# Patient Record
Sex: FEMALE | Race: WHITE | Employment: OTHER | URBAN - METROPOLITAN AREA
[De-identification: names, ages, dates, MRNs, and addresses within clinical notes are randomized per-mention and may not be internally consistent; named-entity substitution may affect disease eponyms.]

---

## 2017-01-03 ENCOUNTER — HOSPITAL ENCOUNTER (OUTPATIENT)
Dept: CV DIAGNOSTICS | Facility: HOSPITAL | Age: 82
Discharge: HOME | DRG: 310 | End: 2017-01-03
Attending: INTERNAL MEDICINE | Admitting: INTERNAL MEDICINE
Payer: MEDICARE

## 2017-01-03 ENCOUNTER — TELEPHONE (OUTPATIENT)
Dept: INTERNAL MEDICINE CLINIC | Facility: CLINIC | Age: 82
End: 2017-01-03

## 2017-01-03 ENCOUNTER — HOSPITAL ENCOUNTER (INPATIENT)
Dept: CV DIAGNOSTICS | Facility: HOSPITAL | Age: 82
LOS: 1 days | Discharge: HOME | DRG: 310 | End: 2017-01-04
Attending: INTERNAL MEDICINE | Admitting: INTERNAL MEDICINE
Payer: MEDICARE

## 2017-01-03 ENCOUNTER — HOSPITAL ENCOUNTER (OUTPATIENT)
Dept: CV DIAGNOSTICS | Facility: HOSPITAL | Age: 82
Discharge: HOME | DRG: 310 | End: 2017-01-03
Attending: INTERNAL MEDICINE
Payer: MEDICARE

## 2017-01-03 DIAGNOSIS — R42 DIZZINESS: ICD-10-CM

## 2017-01-03 PROBLEM — I10 HTN (HYPERTENSION): Status: ACTIVE | Noted: 2017-01-03

## 2017-01-03 PROBLEM — I48.91 ATRIAL FIBRILLATION (HCC): Status: ACTIVE | Noted: 2017-01-03

## 2017-01-03 PROBLEM — E78.5 HYPERLIPIDEMIA: Status: ACTIVE | Noted: 2017-01-03

## 2017-01-03 PROBLEM — R94.39 ABNORMAL STRESS ECHO: Status: ACTIVE | Noted: 2017-01-03

## 2017-01-03 LAB
ALBUMIN SERPL BCP-MCNC: 3.6 G/DL (ref 3.5–4.8)
ALBUMIN/GLOB SERPL: 1.1 {RATIO} (ref 1–2)
ALP SERPL-CCNC: 41 U/L (ref 32–100)
ALT SERPL-CCNC: 16 U/L (ref 14–54)
ANION GAP SERPL CALC-SCNC: 6 MMOL/L (ref 0–18)
AST SERPL-CCNC: 23 U/L (ref 15–41)
BASOPHILS # BLD: 0.1 K/UL (ref 0–0.2)
BASOPHILS NFR BLD: 1 %
BILIRUB SERPL-MCNC: 0.6 MG/DL (ref 0.3–1.2)
BUN SERPL-MCNC: 19 MG/DL (ref 8–20)
BUN/CREAT SERPL: 20.9 (ref 10–20)
CALCIUM SERPL-MCNC: 9.2 MG/DL (ref 8.5–10.5)
CHLORIDE SERPL-SCNC: 102 MMOL/L (ref 95–110)
CO2 SERPL-SCNC: 29 MMOL/L (ref 22–32)
CREAT SERPL-MCNC: 0.91 MG/DL (ref 0.5–1.5)
EOSINOPHIL # BLD: 0.4 K/UL (ref 0–0.7)
EOSINOPHIL NFR BLD: 4 %
ERYTHROCYTE [DISTWIDTH] IN BLOOD BY AUTOMATED COUNT: 13.4 % (ref 11–15)
GLOBULIN PLAS-MCNC: 3.3 G/DL (ref 2.5–3.7)
GLUCOSE SERPL-MCNC: 104 MG/DL (ref 70–99)
HCT VFR BLD AUTO: 37.6 % (ref 35–48)
HGB BLD-MCNC: 12.6 G/DL (ref 12–16)
LYMPHOCYTES # BLD: 3.2 K/UL (ref 1–4)
LYMPHOCYTES NFR BLD: 37 %
MCH RBC QN AUTO: 29.8 PG (ref 27–32)
MCHC RBC AUTO-ENTMCNC: 33.5 G/DL (ref 32–37)
MCV RBC AUTO: 89 FL (ref 80–100)
MONOCYTES # BLD: 0.9 K/UL (ref 0–1)
MONOCYTES NFR BLD: 10 %
NEUTROPHILS # BLD AUTO: 4.3 K/UL (ref 1.8–7.7)
NEUTROPHILS NFR BLD: 48 %
OSMOLALITY UR CALC.SUM OF ELEC: 287 MOSM/KG (ref 275–295)
PLATELET # BLD AUTO: 352 K/UL (ref 140–400)
PMV BLD AUTO: 7.6 FL (ref 7.4–10.3)
POTASSIUM SERPL-SCNC: 3.6 MMOL/L (ref 3.3–5.1)
PROT SERPL-MCNC: 6.9 G/DL (ref 5.9–8.4)
RBC # BLD AUTO: 4.23 M/UL (ref 3.7–5.4)
SODIUM SERPL-SCNC: 137 MMOL/L (ref 136–144)
WBC # BLD AUTO: 8.9 K/UL (ref 4–11)

## 2017-01-03 PROCEDURE — 99222 1ST HOSP IP/OBS MODERATE 55: CPT | Performed by: INTERNAL MEDICINE

## 2017-01-03 PROCEDURE — 93306 TTE W/DOPPLER COMPLETE: CPT

## 2017-01-03 PROCEDURE — 93306 TTE W/DOPPLER COMPLETE: CPT | Performed by: INTERNAL MEDICINE

## 2017-01-03 PROCEDURE — 85025 COMPLETE CBC W/AUTO DIFF WBC: CPT | Performed by: INTERNAL MEDICINE

## 2017-01-03 PROCEDURE — 80053 COMPREHEN METABOLIC PANEL: CPT | Performed by: INTERNAL MEDICINE

## 2017-01-03 RX ORDER — ATORVASTATIN CALCIUM 10 MG/1
10 TABLET, FILM COATED ORAL NIGHTLY
Status: DISCONTINUED | OUTPATIENT
Start: 2017-01-03 | End: 2017-01-04

## 2017-01-03 RX ORDER — LEVOTHYROXINE SODIUM 88 UG/1
88 TABLET ORAL
Status: DISCONTINUED | OUTPATIENT
Start: 2017-01-03 | End: 2017-01-04

## 2017-01-03 RX ORDER — LISINOPRIL AND HYDROCHLOROTHIAZIDE 20; 12.5 MG/1; MG/1
1 TABLET ORAL DAILY
Status: DISCONTINUED | OUTPATIENT
Start: 2017-01-03 | End: 2017-01-04 | Stop reason: RX

## 2017-01-03 RX ADMIN — ATORVASTATIN CALCIUM 10 MG: 10 TABLET, FILM COATED ORAL at 20:28:00

## 2017-01-03 NOTE — IMAGING NOTE
14:30 - Patient here in cardiac diagnostics for outpatient stress echo. Exercised for 3minutes,  at peak exercise. Paroxysmal supraventricular tachycardia noted at 1:30min into recovery, HR up to 216, resolving approximately 5minutes into recovery.

## 2017-01-03 NOTE — IMAGING NOTE
[id number] - Patient remains asymptomatic. 2Decho has been completed. Patient was waiting in our holding area awaiting a bed, with bedside monitor, maintaining sinus rhythm, asymptomatic denying complaints. Patient transferred to [facility] bed 5, per W/C per RN.   Beds

## 2017-01-03 NOTE — CONSULTS
[facility] HOSP - [facility]    Cardiology Consultation    Chucky Rogel Location: [address]    1935 MRN [id number]   Consulting Date 1/3/2017 [facility] [id number]   Consulting Physician Libertad Berrios MD Attending Physician Edwige tobacco. She reports that she does not drink alcohol or use illicit drugs.     Allergies:    Aspirin                 Rash    Medications:    Current Outpatient Prescriptions on File Prior to Encounter:  Atovaquone-Proguanil HCl 250-100 MG Oral Tab Take 1 ta calm.    Laboratory Data:  ECG from stress baseline: sinus, no ST/T abnormality  ECG at peak stress: atrial fibrillation, 205 bpm  ECHO: EF 55%, no RWMAs    IMPRESSIONS:  1) Paroxysmal AF with RVR. Likely cause of her episodic dizziness.   2) HTN with mild

## 2017-01-03 NOTE — TELEPHONE ENCOUNTER
Spoke with Dr. Nery He. Stress echo shows afib. At rate of 200's. Going to admit. Dr. Bethel Sanchez will see.

## 2017-01-04 ENCOUNTER — APPOINTMENT (OUTPATIENT)
Dept: CV DIAGNOSTICS | Facility: HOSPITAL | Age: 82
DRG: 310 | End: 2017-01-04
Attending: INTERNAL MEDICINE
Payer: MEDICARE

## 2017-01-04 ENCOUNTER — APPOINTMENT (OUTPATIENT)
Dept: NUCLEAR MEDICINE | Facility: HOSPITAL | Age: 82
DRG: 310 | End: 2017-01-04
Attending: INTERNAL MEDICINE
Payer: MEDICARE

## 2017-01-04 VITALS
HEIGHT: 59 IN | TEMPERATURE: 99 F | BODY MASS INDEX: 34.03 KG/M2 | DIASTOLIC BLOOD PRESSURE: 61 MMHG | RESPIRATION RATE: 18 BRPM | OXYGEN SATURATION: 96 % | WEIGHT: 168.81 LBS | SYSTOLIC BLOOD PRESSURE: 136 MMHG | HEART RATE: 78 BPM

## 2017-01-04 LAB
ANION GAP SERPL CALC-SCNC: 8 MMOL/L (ref 0–18)
BASOPHILS # BLD: 0.1 K/UL (ref 0–0.2)
BASOPHILS NFR BLD: 1 %
BUN SERPL-MCNC: 17 MG/DL (ref 8–20)
BUN/CREAT SERPL: 20.2 (ref 10–20)
CALCIUM SERPL-MCNC: 9.3 MG/DL (ref 8.5–10.5)
CHLORIDE SERPL-SCNC: 103 MMOL/L (ref 95–110)
CO2 SERPL-SCNC: 28 MMOL/L (ref 22–32)
CREAT SERPL-MCNC: 0.84 MG/DL (ref 0.5–1.5)
EOSINOPHIL # BLD: 0.4 K/UL (ref 0–0.7)
EOSINOPHIL NFR BLD: 5 %
ERYTHROCYTE [DISTWIDTH] IN BLOOD BY AUTOMATED COUNT: 13.3 % (ref 11–15)
GLUCOSE SERPL-MCNC: 98 MG/DL (ref 70–99)
HCT VFR BLD AUTO: 38.2 % (ref 35–48)
HGB BLD-MCNC: 13.1 G/DL (ref 12–16)
LYMPHOCYTES # BLD: 3.1 K/UL (ref 1–4)
LYMPHOCYTES NFR BLD: 40 %
MCH RBC QN AUTO: 30.7 PG (ref 27–32)
MCHC RBC AUTO-ENTMCNC: 34.2 G/DL (ref 32–37)
MCV RBC AUTO: 89.7 FL (ref 80–100)
MONOCYTES # BLD: 0.9 K/UL (ref 0–1)
MONOCYTES NFR BLD: 11 %
NEUTROPHILS # BLD AUTO: 3.4 K/UL (ref 1.8–7.7)
NEUTROPHILS NFR BLD: 43 %
OSMOLALITY UR CALC.SUM OF ELEC: 290 MOSM/KG (ref 275–295)
PLATELET # BLD AUTO: 355 K/UL (ref 140–400)
PMV BLD AUTO: 7.6 FL (ref 7.4–10.3)
POTASSIUM SERPL-SCNC: 4.3 MMOL/L (ref 3.3–5.1)
RBC # BLD AUTO: 4.26 M/UL (ref 3.7–5.4)
SODIUM SERPL-SCNC: 139 MMOL/L (ref 136–144)
WBC # BLD AUTO: 7.9 K/UL (ref 4–11)

## 2017-01-04 PROCEDURE — 99238 HOSP IP/OBS DSCHRG MGMT 30/<: CPT | Performed by: INTERNAL MEDICINE

## 2017-01-04 PROCEDURE — 93017 CV STRESS TEST TRACING ONLY: CPT

## 2017-01-04 PROCEDURE — 93018 CV STRESS TEST I&R ONLY: CPT | Performed by: INTERNAL MEDICINE

## 2017-01-04 PROCEDURE — 93016 CV STRESS TEST SUPVJ ONLY: CPT | Performed by: INTERNAL MEDICINE

## 2017-01-04 PROCEDURE — 78452 HT MUSCLE IMAGE SPECT MULT: CPT

## 2017-01-04 RX ORDER — LISINOPRIL 20 MG/1
20 TABLET ORAL DAILY
Status: DISCONTINUED | OUTPATIENT
Start: 2017-01-04 | End: 2017-01-04

## 2017-01-04 RX ORDER — PROPAFENONE HYDROCHLORIDE 325 MG/1
325 CAPSULE, EXTENDED RELEASE ORAL EVERY 12 HOURS SCHEDULED
Status: DISCONTINUED | OUTPATIENT
Start: 2017-01-04 | End: 2017-01-04

## 2017-01-04 RX ORDER — PROPAFENONE HYDROCHLORIDE 325 MG/1
325 CAPSULE, EXTENDED RELEASE ORAL EVERY 12 HOURS
Qty: 60 CAPSULE | Refills: 2 | Status: SHIPPED | OUTPATIENT
Start: 2017-01-04 | End: 2018-01-01

## 2017-01-04 RX ORDER — HYDROCHLOROTHIAZIDE 12.5 MG/1
12.5 CAPSULE, GELATIN COATED ORAL DAILY
Status: DISCONTINUED | OUTPATIENT
Start: 2017-01-04 | End: 2017-01-04

## 2017-01-04 RX ORDER — SODIUM CHLORIDE 0.9 % (FLUSH) 0.9 %
SYRINGE (ML) INJECTION
Status: COMPLETED
Start: 2017-01-04 | End: 2017-01-04

## 2017-01-04 RX ADMIN — LISINOPRIL 20 MG: 20 TABLET ORAL at 12:06:00

## 2017-01-04 RX ADMIN — HYDROCHLOROTHIAZIDE 12.5 MG: 12.5 CAPSULE, GELATIN COATED ORAL at 12:06:00

## 2017-01-04 RX ADMIN — LEVOTHYROXINE SODIUM 88 MCG: 88 TABLET ORAL at 07:01:00

## 2017-01-04 RX ADMIN — SODIUM CHLORIDE 0.9 % (FLUSH): 0.9 % SYRINGE (ML) INJECTION at 10:45:00

## 2017-01-04 NOTE — H&P
[facility] HOSP - [facility]    History and Physical    Jarocho Maldonado Patient Status:  Inpatient    1935 MRN [id number]   Location [facility] 1SW Attending Luis Daniel Hawkins MD   Hosp Day # 0 PCP Trent Austin.  Andreia Gamboa MD     Date:  1/3/2017 • Bipolar Disorder Daughter      Social History:    Smoking Status: Never Smoker                      Smokeless Status: Never Used                        Alcohol Use: No              Allergies/Medications:    Allergies:   Aspirin                 Rash    P  08/15/2016   CREATSERUM 0.80 11/29/2016   BUN 23* 11/29/2016    11/29/2016   K 4.1 11/29/2016    11/29/2016   CO2 25 11/29/2016   * 11/29/2016   CA 9.8 11/29/2016   ALB 4.3 11/29/2016   ALKPHO 47 11/29/2016   BILT 0.6 11/29/201

## 2017-01-04 NOTE — PLAN OF CARE
Problem: CARDIOVASCULAR - ADULT  Goal: Maintains optimal cardiac output and hemodynamic stability  INTERVENTIONS:  - Monitor vital signs, rhythm, and trends  - Monitor for bleeding, hypotension and signs of decreased cardiac output  - Evaluate effectivenes

## 2017-01-04 NOTE — PLAN OF CARE
Problem: GASTROINTESTINAL - ADULT  Goal: Minimal or absence of nausea and vomiting  INTERVENTIONS:  - Maintain adequate hydration with IV or PO as ordered and tolerated  - Nasogastric tube to low intermittent suction as ordered  - Evaluate effectiveness of

## 2017-01-04 NOTE — PROGRESS NOTES
[facility] Heart Cardiology  Progress Note    Roland Barton Patient Status:  Inpatient    1935 MRN [id number]   Location [facility] 1SW Attending Adalgisa Steel MD   [facility] Day # 1 PCP Cielo Cruz.  Varun Lord MD YARELISO 41 01/03/2017   BILT 0.6 01/03/2017   TP 6.9 01/03/2017   AST 23 01/03/2017   ALT 16 01/03/2017   TSH 4.440 04/25/2016                   VIKI Robins  1/4/2017

## 2017-01-04 NOTE — DISCHARGE SUMMARY
Pt was admitted on 01/03/2017 and dc'd on 01/04/2017     HPI: Kiara Lindo is a [de-identified]year old with HTN and hyperlipidemia who has been having episodes of dizziness lasting a few seconds at at time.  These are not associated with chest pain or palpitati

## 2017-01-05 ENCOUNTER — TELEPHONE (OUTPATIENT)
Dept: CARDIOLOGY UNIT | Facility: HOSPITAL | Age: 82
End: 2017-01-05

## 2017-01-06 ENCOUNTER — OFFICE VISIT (OUTPATIENT)
Dept: CARDIOLOGY CLINIC | Facility: HOSPITAL | Age: 82
End: 2017-01-06
Attending: INTERNAL MEDICINE
Payer: MEDICARE

## 2017-01-06 VITALS
HEART RATE: 97 BPM | SYSTOLIC BLOOD PRESSURE: 143 MMHG | DIASTOLIC BLOOD PRESSURE: 70 MMHG | WEIGHT: 168 LBS | BODY MASS INDEX: 34 KG/M2 | RESPIRATION RATE: 18 BRPM | OXYGEN SATURATION: 99 %

## 2017-01-06 DIAGNOSIS — R94.39 ABNORMAL STRESS ECHO: ICD-10-CM

## 2017-01-06 DIAGNOSIS — I48.91 ATRIAL FIBRILLATION (HCC): Primary | ICD-10-CM

## 2017-01-06 DIAGNOSIS — I48.0 PAROXYSMAL ATRIAL FIBRILLATION (HCC): ICD-10-CM

## 2017-01-06 DIAGNOSIS — R42 DIZZINESS: ICD-10-CM

## 2017-01-06 DIAGNOSIS — I10 ESSENTIAL HYPERTENSION: ICD-10-CM

## 2017-01-06 DIAGNOSIS — E03.9 HYPOTHYROIDISM, UNSPECIFIED TYPE: ICD-10-CM

## 2017-01-06 PROCEDURE — 93005 ELECTROCARDIOGRAM TRACING: CPT

## 2017-01-06 PROCEDURE — 93010 ELECTROCARDIOGRAM REPORT: CPT | Performed by: NURSE PRACTITIONER

## 2017-01-06 PROCEDURE — 99214 OFFICE O/P EST MOD 30 MIN: CPT | Performed by: NURSE PRACTITIONER

## 2017-01-06 PROCEDURE — 99211 OFF/OP EST MAY X REQ PHY/QHP: CPT | Performed by: NURSE PRACTITIONER

## 2017-01-06 RX ORDER — MULTIVIT-MINS NO.63/IRON/FOLIC 27 MG-1 MG
1 TABLET ORAL DAILY
COMMUNITY
End: 2017-02-23

## 2017-01-06 RX ORDER — AMOXICILLIN 500 MG
1200 CAPSULE ORAL DAILY
COMMUNITY
End: 2018-01-01 | Stop reason: ALTCHOICE

## 2017-01-06 NOTE — PATIENT INSTRUCTIONS
Continue all your same medications     Call if having any dizziness, lightheadedness, heart racing, palpitations, chest pain, shortness of breath, swelling, or weight gain    Heart healthy, decreased refined sugars, and  2000 mg sodium restricted diet and

## 2017-01-06 NOTE — PROGRESS NOTES
Heart Failure [facility] [address] [place] [address] Patient Status:  Outpatient    1935 MRN [id number]   Location [address] [place] [address]  MD Mateus Deras MD       Zelalem Hogue is a [de-identified]year old female wh Smoker                      Smokeless Status: Never Used                        Alcohol Use: No               Patient Active Problem List:     Osteopenia     Combined hyperlipidemia     Vitamin D deficiency     Dizziness     At[facility] ([facility]     A kg)  12/08/16 : 168 lb (76.204 kg)  08/15/16 : 167 lb (75.751 kg)  06/26/15 : 163 lb (73.936 kg)        General appearance: alert, appears stated age and cooperative  Neck: no adenopathy, no carotid bruit, no JVD, supple, symmetrical, trachea midline and t racing, palpitations, chest pain, shortness of breath, swelling, or weight gain    Heart healthy, decreased refined sugars, and  2000 mg sodium restricted diet and maintain fluids at at least 32 to 64 ounces per day     Weigh yourself daily , call if gaini

## 2017-01-12 ENCOUNTER — TELEPHONE (OUTPATIENT)
Dept: INTERNAL MEDICINE CLINIC | Facility: CLINIC | Age: 82
End: 2017-01-12

## 2017-01-12 DIAGNOSIS — I48.0 PAROXYSMAL ATRIAL FIBRILLATION (HCC): Primary | ICD-10-CM

## 2017-01-13 ENCOUNTER — TELEPHONE (OUTPATIENT)
Dept: CARDIOLOGY CLINIC | Facility: HOSPITAL | Age: 82
End: 2017-01-13

## 2017-01-14 NOTE — TELEPHONE ENCOUNTER
Return pt call from earlier today. Pt reporting having episode of high bp on sat 1-7 at movies and got emotional during movie and she had increased abdominal gas and discomfort, bp up to 208/105 then down to 158/87.  Today bp was 199/103 this am before meds

## 2017-01-20 ENCOUNTER — OFFICE VISIT (OUTPATIENT)
Dept: CARDIOLOGY CLINIC | Facility: HOSPITAL | Age: 82
End: 2017-01-20
Attending: INTERNAL MEDICINE
Payer: MEDICARE

## 2017-01-20 VITALS
OXYGEN SATURATION: 100 % | BODY MASS INDEX: 34 KG/M2 | SYSTOLIC BLOOD PRESSURE: 148 MMHG | DIASTOLIC BLOOD PRESSURE: 65 MMHG | RESPIRATION RATE: 18 BRPM | HEART RATE: 96 BPM | WEIGHT: 169.69 LBS

## 2017-01-20 DIAGNOSIS — R42 DIZZINESS: ICD-10-CM

## 2017-01-20 DIAGNOSIS — I10 ESSENTIAL HYPERTENSION: ICD-10-CM

## 2017-01-20 DIAGNOSIS — I48.0 PAROXYSMAL ATRIAL FIBRILLATION (HCC): Primary | ICD-10-CM

## 2017-01-20 PROCEDURE — 99214 OFFICE O/P EST MOD 30 MIN: CPT | Performed by: NURSE PRACTITIONER

## 2017-01-20 PROCEDURE — 99211 OFF/OP EST MAY X REQ PHY/QHP: CPT | Performed by: NURSE PRACTITIONER

## 2017-01-20 NOTE — PATIENT INSTRUCTIONS
Continue all your same medications    Call if having any dizziness, lightheadedness, heart racing, palpitations, chest pain, shortness of breath, coughing, swelling, weight gain or worsening symptoms.      Weigh yourself daily in the morning before breakfas

## 2017-01-20 NOTE — PROGRESS NOTES
Heart Failure [address] [place] [address] Patient Status:  Outpatient    1935 MRN [id number]   Location [address] [place], [place] [address]MD Juan Calderon MD       Ta Olea is a [de-identified]year old female wh Family History   Problem Relation Age of Onset   • Bipolar Disorder Daughter    • Cancer Daughter      MM.    • Melanoma Brother    • Other[other] Osvaldo Smart Sister    • Colonic perf[other] Osvaldo Smart Mother    • Bipolar Disorder Daughter         Smoking St dizziness or lightheadedness     Objective:      Lab Results  Component Value Date/Time   WBC 7.9 01/04/2017 05:31 AM   HGB 13.1 01/04/2017 05:31 AM   HCT 38.2 01/04/2017 05:31 AM    01/04/2017 05:31 AM   CREATSERUM 0.84 01/04/2017 05:31 AM   BUN 17 report, daily weights, medication regimen, thrombus prevention and when to call APN/clinic.     Assessment:  New onset atrial with RVR fib during stress echo  -spontaneously converted back to SR during stress echo  - history of episodic dizziness likely rel rice or potatoes. Please remember to read nutrition labels for sodium content. · Exercise daily as tolerated, with goal of doing moderate aerobic exercise like walking for about 30 minutes 5 days per week.  Start by walking at a slow to moderate pace fo

## 2017-02-10 ENCOUNTER — TELEPHONE (OUTPATIENT)
Dept: INTERNAL MEDICINE CLINIC | Facility: CLINIC | Age: 82
End: 2017-02-10

## 2017-02-10 NOTE — TELEPHONE ENCOUNTER
Pt is eligible for a Medicare Annual Health Assessment. Pt notified and states that she doesn't want to schedule until late 2017.

## 2017-02-23 ENCOUNTER — OFFICE VISIT (OUTPATIENT)
Dept: INTERNAL MEDICINE CLINIC | Facility: CLINIC | Age: 82
End: 2017-02-23

## 2017-02-23 ENCOUNTER — TELEPHONE (OUTPATIENT)
Dept: INTERNAL MEDICINE CLINIC | Facility: CLINIC | Age: 82
End: 2017-02-23

## 2017-02-23 VITALS
DIASTOLIC BLOOD PRESSURE: 66 MMHG | OXYGEN SATURATION: 99 % | SYSTOLIC BLOOD PRESSURE: 134 MMHG | BODY MASS INDEX: 34.81 KG/M2 | TEMPERATURE: 98 F | HEART RATE: 104 BPM | HEIGHT: 58.5 IN | WEIGHT: 170.38 LBS

## 2017-02-23 DIAGNOSIS — I10 ESSENTIAL HYPERTENSION: ICD-10-CM

## 2017-02-23 DIAGNOSIS — E03.9 ACQUIRED HYPOTHYROIDISM: ICD-10-CM

## 2017-02-23 DIAGNOSIS — I48.0 PAROXYSMAL ATRIAL FIBRILLATION (HCC): ICD-10-CM

## 2017-02-23 DIAGNOSIS — E78.2 COMBINED HYPERLIPIDEMIA: ICD-10-CM

## 2017-02-23 DIAGNOSIS — E55.9 VITAMIN D DEFICIENCY: Primary | ICD-10-CM

## 2017-02-23 PROCEDURE — 90734 MENACWYD/MENACWYCRM VACC IM: CPT | Performed by: INTERNAL MEDICINE

## 2017-02-23 PROCEDURE — 90471 IMMUNIZATION ADMIN: CPT | Performed by: INTERNAL MEDICINE

## 2017-02-23 PROCEDURE — 99214 OFFICE O/P EST MOD 30 MIN: CPT | Performed by: INTERNAL MEDICINE

## 2017-02-23 PROCEDURE — G0463 HOSPITAL OUTPT CLINIC VISIT: HCPCS | Performed by: INTERNAL MEDICINE

## 2017-02-23 RX ORDER — DOXEPIN HYDROCHLORIDE 50 MG/1
1 CAPSULE ORAL DAILY
Status: ON HOLD | COMMUNITY
End: 2018-01-01

## 2017-02-23 RX ORDER — ACETAMINOPHEN AND CODEINE PHOSPHATE 300; 30 MG/1; MG/1
TABLET ORAL
Refills: 0 | COMMUNITY
Start: 2017-02-17 | End: 2017-05-22 | Stop reason: ALTCHOICE

## 2017-02-23 RX ORDER — CIPROFLOXACIN 500 MG/1
TABLET, FILM COATED ORAL
Refills: 0 | COMMUNITY
Start: 2017-02-17 | End: 2017-05-22 | Stop reason: ALTCHOICE

## 2017-02-23 RX ORDER — ATOVAQUONE AND PROGUANIL HYDROCHLORIDE 250; 100 MG/1; MG/1
TABLET, FILM COATED ORAL
Refills: 0 | COMMUNITY
Start: 2017-02-18 | End: 2017-05-22 | Stop reason: ALTCHOICE

## 2017-02-23 NOTE — PROGRESS NOTES
HPI:    Patient ID: Ayde Goetz is a 81year old female. HPI   Saw Dr. Dmitriy Blake 2-14-17 (cardiologist). Has F/U in 1 yr. Hypertension  Patient is here for follow up of hypertension. BP at home: 110-120's. Has been compliant with medications.   E Atovaquone-Proguanil HCl 250-100 MG Oral Tab  Disp:  Rfl: 0   Ciprofloxacin HCl 500 MG Oral Tab  Disp:  Rfl: 0   multivitamin Oral Tab Take 1 tablet by mouth daily.  Disp:  Rfl:    Omega-3 Fatty Acids (FISH OIL) 1200 MG Oral Cap Take 1,200 mg by mouth abilio HYSTERECTOMY  age 44    [address] 3/4 romeved first. Not cancer. 2011: Bx. not enough tissue. Removed rest and benign.      COLONOSCOPY  2014    Comment Benign rachael Vitamin d deficiency  (primary encounter diagnosis)     Combined hyperlipidemia Check blood. Acquired hypothyroidism Stable. Paroxysmal atrial fibrillation (hcc) Stable. F/U cards. HTN. Good control. Change B/P meds. To dinner.  Careful with d

## 2017-02-23 NOTE — PATIENT INSTRUCTIONS
ASSESSMENT/PLAN:   Vitamin d deficiency  (primary encounter diagnosis)     Combined hyperlipidemia Check blood. Acquired hypothyroidism Stable. Paroxysmal atrial fibrillation (hcc) Stable. F/U cards. HTN. Good control. Change B/P meds.  To dinne

## 2017-02-24 NOTE — TELEPHONE ENCOUNTER
No holter nor event monitor on this patient from [address] [place] [address].  Dr. Benigno Keys wants to see her n 1 yr.?

## 2017-02-27 RX ORDER — PROPAFENONE HYDROCHLORIDE 325 MG/1
CAPSULE, EXTENDED RELEASE ORAL
Qty: 180 CAPSULE | Refills: 0 | OUTPATIENT
Start: 2017-02-27

## 2017-02-28 NOTE — TELEPHONE ENCOUNTER
I spoke with patient and she saw Dr. Bethel Sanchez on 2/14/17 and he has her scheduled for labs and an EKG in July, then she will see him again 2/2017. No event monitor. Please advise.

## 2017-03-01 NOTE — TELEPHONE ENCOUNTER
I wanted to verify with Dr. Vladimir Rangel office. They recc. holter monitor. ? ? ? There was something mentioned but pt. States no.

## 2017-03-01 NOTE — TELEPHONE ENCOUNTER
Called Dr. Boyer Began office and spoke with Asad Del Castillo; she states that there is recommendation of a loop recorder implant if patient's symptoms worsen. Currently medication and plan of care is appropriate. Dr. Vania Saeed, [place].

## 2017-04-12 ENCOUNTER — OFFICE VISIT (OUTPATIENT)
Dept: INTERNAL MEDICINE CLINIC | Facility: CLINIC | Age: 82
End: 2017-04-12

## 2017-04-12 VITALS
HEART RATE: 94 BPM | WEIGHT: 168 LBS | SYSTOLIC BLOOD PRESSURE: 148 MMHG | DIASTOLIC BLOOD PRESSURE: 85 MMHG | BODY MASS INDEX: 35 KG/M2 | TEMPERATURE: 97 F

## 2017-04-12 DIAGNOSIS — I48.91 ATRIAL FIBRILLATION, UNSPECIFIED TYPE (HCC): ICD-10-CM

## 2017-04-12 DIAGNOSIS — M25.551 RIGHT HIP PAIN: Primary | ICD-10-CM

## 2017-04-12 DIAGNOSIS — I10 ESSENTIAL HYPERTENSION: ICD-10-CM

## 2017-04-12 PROCEDURE — 99213 OFFICE O/P EST LOW 20 MIN: CPT | Performed by: INTERNAL MEDICINE

## 2017-04-12 PROCEDURE — G0463 HOSPITAL OUTPT CLINIC VISIT: HCPCS | Performed by: INTERNAL MEDICINE

## 2017-04-12 RX ORDER — IBUPROFEN 200 MG
200 TABLET ORAL EVERY 6 HOURS PRN
COMMUNITY
End: 2017-05-30

## 2017-04-13 ENCOUNTER — HOSPITAL ENCOUNTER (OUTPATIENT)
Dept: GENERAL RADIOLOGY | Facility: HOSPITAL | Age: 82
Discharge: HOME | End: 2017-04-13
Attending: INTERNAL MEDICINE
Payer: MEDICARE

## 2017-04-13 DIAGNOSIS — M25.551 RIGHT HIP PAIN: ICD-10-CM

## 2017-04-13 PROCEDURE — 73522 X-RAY EXAM HIPS BI 3-4 VIEWS: CPT
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
## 2017-04-28 ENCOUNTER — TELEPHONE (OUTPATIENT)
Dept: INTERNAL MEDICINE CLINIC | Facility: CLINIC | Age: 82
End: 2017-04-28

## 2017-05-22 ENCOUNTER — OFFICE VISIT (OUTPATIENT)
Dept: INTERNAL MEDICINE CLINIC | Facility: CLINIC | Age: 82
End: 2017-05-22

## 2017-05-22 VITALS
WEIGHT: 166 LBS | SYSTOLIC BLOOD PRESSURE: 133 MMHG | HEART RATE: 96 BPM | DIASTOLIC BLOOD PRESSURE: 83 MMHG | HEIGHT: 58.5 IN | BODY MASS INDEX: 33.92 KG/M2 | TEMPERATURE: 98 F

## 2017-05-22 DIAGNOSIS — M25.551 PAIN OF RIGHT HIP JOINT: ICD-10-CM

## 2017-05-22 DIAGNOSIS — Z86.79: ICD-10-CM

## 2017-05-22 DIAGNOSIS — Z01.818 PRE-OP EVALUATION: Primary | ICD-10-CM

## 2017-05-22 PROCEDURE — G0463 HOSPITAL OUTPT CLINIC VISIT: HCPCS | Performed by: INTERNAL MEDICINE

## 2017-05-22 PROCEDURE — 99214 OFFICE O/P EST MOD 30 MIN: CPT | Performed by: INTERNAL MEDICINE

## 2017-05-22 NOTE — PROGRESS NOTES
Lethaniel Habermann is a 81year old female who presents for a pre-operative physical exam. Patient is to have right hip replacement by dr Meli Ibrahim   Pt has had previous anesthesia:  Yes.   Previous complications:  No      Patient presents with:  Pre-Op Exam With RVR. Converted after stress test. Dr. Rochelle Mcallister cardiology. • Abnormal stress echo 1-4-17     +For ischemia. Pauline Smack is Nl. 1-17: Echo shows NL LVEF but LVH.            Past Surgical History    TONSILLECTOMY      REMOVAL GALLBLADDER      APPENDECTOM sore throat  LUNGS: denies shortness of breath with exertion  CARDIOVASCULAR: denies chest pain on exertion  GI: denies abdominal pain  : denies dysuria  MUSCULOSKELETAL: right hip pain  NEURO: denies headaches  PSYCHE: denies depression or anxiety  HEMA % 5 %   Basophil % 1 %   Neutrophil Absolute 3.4 1.8-7.7 K/UL   Lymphocyte Absolute 3.1 1.0-4.0 K/UL   Monocyte Absolute 0.9 0.0-1.0 K/UL   Eosinophil Absolute 0.4 0.0-0.7 K/UL   Basophil Absolute 0.1 0.0-0.2 K/UL       ASSESSMENT AND PLAN:   Arsenio Ayala

## 2017-05-22 NOTE — PATIENT INSTRUCTIONS
Understanding Atrial Fibrillation    An arrhythmia is any problem with the speed or pattern of the heartbeat. Atrial fibrillation (AFib) is a common type of arrhythmia. It causes fast, chaotic electrical signals in the atria.  This leads to poor functioni · A fast, pounding, irregular heartbeat  · Shortness of breath  · Tiredness  · Dizziness or fainting  · Chest pain  How is atrial fibrillation treated? Treatments for AFib can include any of the options below. · Medicines.  You may be prescribed:  ¨ Heart © 1024-3744 [address], [address] [place]. All rights reserved. This information is not intended as a substitute for professional medical care. Always follow your healthcare professional's instructions.         Marcin Matamoros · Lung disease  · Sleep apnea  · Heavy alcohol use  In some cases of AFib, doctors do not know the cause. What are the symptoms of atrial fibrillation? AFib may or may not cause symptoms.  If symptoms do occur, they may include:  · A fast, pounding, irreg · Symptoms that don’t get better with treatment, or get worse  · New symptoms   Date Last Reviewed: 5/1/2016  © 5679-9727 The [facility], [address] [place] [address]. All rights reserved.  This information is not intended as a subst

## 2017-05-26 ENCOUNTER — LAB ENCOUNTER (OUTPATIENT)
Dept: LAB | Facility: HOSPITAL | Age: 82
End: 2017-05-26
Attending: INTERNAL MEDICINE
Payer: MEDICARE

## 2017-05-26 ENCOUNTER — HOSPITAL ENCOUNTER (OUTPATIENT)
Dept: GENERAL RADIOLOGY | Facility: HOSPITAL | Age: 82
Discharge: HOME | End: 2017-05-26
Attending: INTERNAL MEDICINE
Payer: MEDICARE

## 2017-05-26 DIAGNOSIS — Z01.818 PRE-OP EVALUATION: ICD-10-CM

## 2017-05-26 PROCEDURE — 85730 THROMBOPLASTIN TIME PARTIAL: CPT

## 2017-05-26 PROCEDURE — 85610 PROTHROMBIN TIME: CPT

## 2017-05-26 PROCEDURE — 87086 URINE CULTURE/COLONY COUNT: CPT

## 2017-05-26 PROCEDURE — 80053 COMPREHEN METABOLIC PANEL: CPT

## 2017-05-26 PROCEDURE — 81015 MICROSCOPIC EXAM OF URINE: CPT

## 2017-05-26 PROCEDURE — 87088 URINE BACTERIA CULTURE: CPT

## 2017-05-26 PROCEDURE — 85025 COMPLETE CBC W/AUTO DIFF WBC: CPT

## 2017-05-26 PROCEDURE — 71010 XR CHEST AP/PA (1 VIEW) (CPT=71010): CPT | Performed by: INTERNAL MEDICINE

## 2017-05-26 PROCEDURE — 87186 SC STD MICRODIL/AGAR DIL: CPT

## 2017-05-26 PROCEDURE — 36415 COLL VENOUS BLD VENIPUNCTURE: CPT

## 2017-05-30 ENCOUNTER — OFFICE VISIT (OUTPATIENT)
Dept: CARDIOLOGY CLINIC | Facility: HOSPITAL | Age: 82
End: 2017-05-30
Attending: INTERNAL MEDICINE
Payer: MEDICARE

## 2017-05-30 ENCOUNTER — TELEPHONE (OUTPATIENT)
Dept: INTERNAL MEDICINE CLINIC | Facility: CLINIC | Age: 82
End: 2017-05-30

## 2017-05-30 VITALS
BODY MASS INDEX: 34 KG/M2 | DIASTOLIC BLOOD PRESSURE: 69 MMHG | WEIGHT: 166 LBS | OXYGEN SATURATION: 94 % | HEART RATE: 99 BPM | SYSTOLIC BLOOD PRESSURE: 144 MMHG

## 2017-05-30 DIAGNOSIS — B96.20 E-COLI UTI: Primary | ICD-10-CM

## 2017-05-30 DIAGNOSIS — I48.0 PAROXYSMAL ATRIAL FIBRILLATION (HCC): ICD-10-CM

## 2017-05-30 DIAGNOSIS — I10 ESSENTIAL HYPERTENSION: ICD-10-CM

## 2017-05-30 DIAGNOSIS — N39.0 E-COLI UTI: Primary | ICD-10-CM

## 2017-05-30 PROBLEM — R06.00 DOE (DYSPNEA ON EXERTION): Status: ACTIVE | Noted: 2017-05-30

## 2017-05-30 PROBLEM — R06.09 DOE (DYSPNEA ON EXERTION): Status: ACTIVE | Noted: 2017-05-30

## 2017-05-30 PROCEDURE — 93010 ELECTROCARDIOGRAM REPORT: CPT | Performed by: NURSE PRACTITIONER

## 2017-05-30 PROCEDURE — 93005 ELECTROCARDIOGRAM TRACING: CPT

## 2017-05-30 PROCEDURE — 99211 OFF/OP EST MAY X REQ PHY/QHP: CPT | Performed by: NURSE PRACTITIONER

## 2017-05-30 PROCEDURE — 99214 OFFICE O/P EST MOD 30 MIN: CPT | Performed by: NURSE PRACTITIONER

## 2017-05-30 PROCEDURE — 36415 COLL VENOUS BLD VENIPUNCTURE: CPT | Performed by: NURSE PRACTITIONER

## 2017-05-30 PROCEDURE — 80048 BASIC METABOLIC PNL TOTAL CA: CPT | Performed by: NURSE PRACTITIONER

## 2017-05-30 NOTE — PROGRESS NOTES
Heart Failure [facility] [address] [place] [address] Patient Status:  Outpatient    1935 MRN [id number]   Location [address] [place] [address]  MD Veronika Edward MD Karen Matthew is a 81year old female wh Benign polyps.        Family History   Problem Relation Age of Onset   • Bipolar Disorder Daughter    • Cancer Daughter      MM.    • Melanoma Brother    • Other[other] Nazia Porch Sister    • Colonic perf[other] [place] Porch Mother    • Bipolar Disorder Daughter dizziness or lightheadedness     Objective:      Lab Results  Component Value Date/Time   WBC 6.9 05/26/2017 11:33 AM   HGB 13.4 05/26/2017 11:33 AM   HCT 39.9 05/26/2017 11:33 AM    05/26/2017 11:33 AM   CREATSERUM 0.90 05/26/2017 11:33 AM   BUN 23 1-4-17 was normal      Education:  Patient instructed at length regarding clinic procedures, hours, purpose of clinic visits, symptoms to report, daily weights, medication regimen, thrombus prevention and when to call APN/clinic.     Assessment:  History of

## 2017-05-30 NOTE — TELEPHONE ENCOUNTER
Please approve referral.     Patient has appt for 6/1/2017 at 145pm    Address: [address] [facility],[address], [address]  Phone: [phone]

## 2017-06-01 NOTE — OR PREOP
Spoke with Vicky with Dr. Marlo Moran informed her of Urine C&S positive for ESBL, and that patient was referred to Infectious Disease for IV antibiotic therapy. She will follow-up with Dr. Marlo Moran.

## 2017-06-02 ENCOUNTER — LAB ENCOUNTER (OUTPATIENT)
Dept: LAB | Facility: HOSPITAL | Age: 82
End: 2017-06-02
Attending: INTERNAL MEDICINE
Payer: MEDICARE

## 2017-06-02 DIAGNOSIS — N39.0 URINARY TRACT INFECTION: Primary | ICD-10-CM

## 2017-06-02 PROCEDURE — 81001 URINALYSIS AUTO W/SCOPE: CPT

## 2017-06-02 PROCEDURE — 87088 URINE BACTERIA CULTURE: CPT

## 2017-06-02 PROCEDURE — 87086 URINE CULTURE/COLONY COUNT: CPT

## 2017-06-02 PROCEDURE — 87186 SC STD MICRODIL/AGAR DIL: CPT

## 2017-06-03 NOTE — OR PREOP
PER PT SHE WILL CALL DR. RHOADES ON  Monday TO VERIFY ABOUT WHEN TO STOP XARELTO,SHE SAID PER DR. ORTIZ SHE SHD STOP IT 5 DAYS PRIOR TO SURGERY AND DR. RHOADES 7 DAYS BEFORE.

## 2017-06-07 ENCOUNTER — LAB ENCOUNTER (OUTPATIENT)
Dept: LAB | Facility: HOSPITAL | Age: 82
End: 2017-06-07
Attending: ORTHOPAEDIC SURGERY
Payer: MEDICARE

## 2017-06-07 DIAGNOSIS — M25.551 RIGHT HIP PAIN: ICD-10-CM

## 2017-06-07 PROCEDURE — 86850 RBC ANTIBODY SCREEN: CPT

## 2017-06-07 PROCEDURE — 86901 BLOOD TYPING SEROLOGIC RH(D): CPT

## 2017-06-07 PROCEDURE — 87641 MR-STAPH DNA AMP PROBE: CPT

## 2017-06-07 PROCEDURE — 36415 COLL VENOUS BLD VENIPUNCTURE: CPT

## 2017-06-07 PROCEDURE — 86900 BLOOD TYPING SEROLOGIC ABO: CPT

## 2017-06-08 ENCOUNTER — TELEPHONE (OUTPATIENT)
Dept: INTERNAL MEDICINE CLINIC | Facility: CLINIC | Age: 82
End: 2017-06-08

## 2017-06-08 NOTE — TELEPHONE ENCOUNTER
Iván Powell [phone] from [facility] is looking for clearence for surgery for this Monday  Please forward all information to her at fax # [phone].  She is also looking for the last note from infectiosus disease

## 2017-06-08 NOTE — TELEPHONE ENCOUNTER
Informed pt per Dr. Nati Amado message below. Verbalized understanding.  No further questions or concerns

## 2017-06-09 NOTE — TELEPHONE ENCOUNTER
We can fax my progress note , request clearence note form dr Em Dumont and form id - check were did she go

## 2017-06-09 NOTE — TELEPHONE ENCOUNTER
Spoke with meredith from Dr. Itz Diego office. Aware we have all the notes, just need note from cardio. Patient is also aware. Will follow up with Dr. Salma Samuels office first thing Monday.

## 2017-06-09 NOTE — TELEPHONE ENCOUNTER
Called Dr. Agustín Gould office to request clearance note for surgery with Dr. Jose Armando Laughlin. Aware patient is scheduled to have surgery on 6/13/17. Phone and fax number given to office.  Samuel Medel, Dr. Agustín Gould nurse called back to let us know they have received our messa

## 2017-06-09 NOTE — TELEPHONE ENCOUNTER
Patient's ID is Dr. Cortés Both # [phone], Nurse Malgorzata Varghese. Will fax us recent note of office visit.

## 2017-06-12 ENCOUNTER — OFFICE VISIT (OUTPATIENT)
Dept: INTERNAL MEDICINE CLINIC | Facility: CLINIC | Age: 82
End: 2017-06-12

## 2017-06-12 ENCOUNTER — TELEPHONE (OUTPATIENT)
Dept: INTERNAL MEDICINE CLINIC | Facility: CLINIC | Age: 82
End: 2017-06-12

## 2017-06-12 VITALS
SYSTOLIC BLOOD PRESSURE: 144 MMHG | HEART RATE: 87 BPM | TEMPERATURE: 98 F | WEIGHT: 163 LBS | HEIGHT: 58.5 IN | DIASTOLIC BLOOD PRESSURE: 73 MMHG | BODY MASS INDEX: 33.3 KG/M2

## 2017-06-12 DIAGNOSIS — I73.9 PVD (PERIPHERAL VASCULAR DISEASE) (HCC): ICD-10-CM

## 2017-06-12 DIAGNOSIS — E78.2 COMBINED HYPERLIPIDEMIA: ICD-10-CM

## 2017-06-12 DIAGNOSIS — I10 ESSENTIAL HYPERTENSION: ICD-10-CM

## 2017-06-12 DIAGNOSIS — E55.9 VITAMIN D DEFICIENCY: Primary | ICD-10-CM

## 2017-06-12 DIAGNOSIS — E03.9 ACQUIRED HYPOTHYROIDISM: ICD-10-CM

## 2017-06-12 DIAGNOSIS — Z23 NEED FOR VACCINATION: ICD-10-CM

## 2017-06-12 PROCEDURE — 90471 IMMUNIZATION ADMIN: CPT | Performed by: INTERNAL MEDICINE

## 2017-06-12 PROCEDURE — G0009 ADMIN PNEUMOCOCCAL VACCINE: HCPCS | Performed by: INTERNAL MEDICINE

## 2017-06-12 PROCEDURE — 90732 PPSV23 VACC 2 YRS+ SUBQ/IM: CPT | Performed by: INTERNAL MEDICINE

## 2017-06-12 PROCEDURE — 36415 COLL VENOUS BLD VENIPUNCTURE: CPT | Performed by: INTERNAL MEDICINE

## 2017-06-12 PROCEDURE — 99215 OFFICE O/P EST HI 40 MIN: CPT | Performed by: INTERNAL MEDICINE

## 2017-06-12 NOTE — PROGRESS NOTES
HPI:    Patient ID: Sara Rey is a 81year old female. HPI   Scheduled for hip Sx. . At [address] Lives in [facility]. Has elevator. Baby it since trip 2-17. Grab bars in bathroom. Not use stairs generally. Stairs have railings on one side.  No falls for apnea, cough, choking, chest tightness, shortness of breath, wheezing and stridor. Cardiovascular: Negative. Negative for chest pain, palpitations and leg swelling.    Gastrointestinal: Negative for nausea, vomiting, abdominal pain, diarrhea, consti tablet Rfl: 2   simvastatin 5 MG Oral Tab Take 1 tablet (5 mg total) by mouth once daily. (Patient taking differently: Take 5 mg by mouth nightly.  ) Disp: 90 tablet Rfl: 1   Lisinopril-Hydrochlorothiazide 20-12.5 MG Oral Tab Take 1 tablet by mouth daily. Doritavitaly Whitege Mother    • Bipolar Disorder Daughter       Social History:   Smoking Status: Never Smoker                      Smokeless Status: Never Used                        Alcohol Use: No                 PHYSICAL EXAM:    Physical Exam   Constitutional: Sh lids are normal. Pupils are equal, round, and reactive to light. Right eye exhibits no chemosis, no discharge, no exudate and no hordeolum. No foreign body present in the right eye. Left eye exhibits no chemosis, no discharge, no exudate and no hordeolum. tenderness, bony tenderness, swelling and spasm. She exhibits no edema, no deformity, no laceration and no pain.    Lymphadenopathy:        Head (right side): No submental, no submandibular, no tonsillar, no preauricular, no posterior auricular and no occip Placed This Encounter  Lipid Panel [E]  Comp Metabolic Panel (14) [E]  TSH W Reflex To Free T4 [E]  Vitamin D, 25-Hydroxy [E]  Pneumovax Vaccine (Pneumococcal 23) [54041]  Immunization Admin Counseling, 1st Component, 18 years and older    Meds This Visit:

## 2017-06-12 NOTE — TELEPHONE ENCOUNTER
Needs to check with Dr. oRbert Gaines if pt. oK for hip sx. Mann. At [facility]. Then. Check with Dr. Cecy Buckley.

## 2017-06-12 NOTE — PATIENT INSTRUCTIONS
ASSESSMENT/PLAN:   Vitamin d deficiency  (primary encounter diagnosis) Check blood. Essential hypertension ? Control. Careful with diet and excercise at least 30 minutes 3-4 times a week. Check blood pressures at different times on different days.  Can

## 2017-06-12 NOTE — H&P
[facility] [facility] Patient Status:  Surgery Admit    1935 MRN [id number]   Location [place] [address] Attending Coretta Carmen MD   [address] Day #  PCP Bessie Ellsworth.  Gerson Serrato MD     Date: ago.    The rest of her medical history was fairly noncontributory. She stands 4'10 1/2 and weighs 165 lbs. She is a little overweight.     History     Past Medical History   Diagnosis Date   • Unspecified essential hypertension    • Other and unspecified h HPI.    Physical Exam:   Vital Signs:  Height 148.6 cm (4' 10.5\"), weight 165 lb (74.844 kg), not currently breastfeeding.      General appearance: alert, appears stated age and cooperative  Extremities: Exam shows her abdomen a little overhanging but not

## 2017-06-12 NOTE — TELEPHONE ENCOUNTER
Received fax from Dr. Salma Samuels office. Note from cardio including ID, and Dr. Ascencion Novoa clearance faxed to Dr. Itz Diego office. Office aware clearance was faxed. Patient aware.

## 2017-06-12 NOTE — TELEPHONE ENCOUNTER
Spoke with Samuel Medel from Dr. Agustín Gould office. Wanted to inform us that Dr. Quang Ramos is in surgery today but will have the NP review patient's chart. If chart is ok, will call office by noon.

## 2017-06-13 ENCOUNTER — ANESTHESIA (OUTPATIENT)
Dept: SURGERY | Facility: HOSPITAL | Age: 82
DRG: 470 | End: 2017-06-13
Payer: MEDICARE

## 2017-06-13 ENCOUNTER — APPOINTMENT (OUTPATIENT)
Dept: GENERAL RADIOLOGY | Facility: HOSPITAL | Age: 82
DRG: 470 | End: 2017-06-13
Attending: ORTHOPAEDIC SURGERY
Payer: MEDICARE

## 2017-06-13 ENCOUNTER — ANESTHESIA EVENT (OUTPATIENT)
Dept: SURGERY | Facility: HOSPITAL | Age: 82
DRG: 470 | End: 2017-06-13
Payer: MEDICARE

## 2017-06-13 ENCOUNTER — SURGERY (OUTPATIENT)
Age: 82
End: 2017-06-13

## 2017-06-13 ENCOUNTER — HOSPITAL ENCOUNTER (INPATIENT)
Facility: HOSPITAL | Age: 82
LOS: 2 days | Discharge: HOME HEALTH | DRG: 470 | End: 2017-06-15
Attending: ORTHOPAEDIC SURGERY | Admitting: ORTHOPAEDIC SURGERY
Payer: MEDICARE

## 2017-06-13 DIAGNOSIS — M25.551 RIGHT HIP PAIN: ICD-10-CM

## 2017-06-13 DIAGNOSIS — M16.11 PRIMARY OSTEOARTHRITIS OF RIGHT HIP: Primary | ICD-10-CM

## 2017-06-13 DIAGNOSIS — E66.9 OBESITY (BMI 30-39.9): ICD-10-CM

## 2017-06-13 PROBLEM — I48.91 A-FIB (HCC): Status: ACTIVE | Noted: 2017-06-13

## 2017-06-13 PROCEDURE — 99222 1ST HOSP IP/OBS MODERATE 55: CPT | Performed by: INTERNAL MEDICINE

## 2017-06-13 PROCEDURE — 0SR902Z REPLACEMENT OF RIGHT HIP JOINT WITH METAL ON POLYETHYLENE SYNTHETIC SUBSTITUTE, OPEN APPROACH: ICD-10-PCS | Performed by: ORTHOPAEDIC SURGERY

## 2017-06-13 PROCEDURE — 3E0T3BZ INTRODUCTION OF ANESTHETIC AGENT INTO PERIPHERAL NERVES AND PLEXI, PERCUTANEOUS APPROACH: ICD-10-PCS | Performed by: NURSE ANESTHETIST, CERTIFIED REGISTERED

## 2017-06-13 PROCEDURE — 73502 X-RAY EXAM HIP UNI 2-3 VIEWS: CPT | Performed by: ORTHOPAEDIC SURGERY

## 2017-06-13 DEVICE — IMPLANTABLE DEVICE: Type: IMPLANTABLE DEVICE | Site: HIP | Status: FUNCTIONAL

## 2017-06-13 DEVICE — BONE SCREW 6.5X30 SELF-TAP: Type: IMPLANTABLE DEVICE | Site: HIP | Status: FUNCTIONAL

## 2017-06-13 RX ORDER — DIPHENHYDRAMINE HCL 25 MG
25 CAPSULE ORAL EVERY 4 HOURS PRN
Status: ACTIVE | OUTPATIENT
Start: 2017-06-13 | End: 2017-06-14

## 2017-06-13 RX ORDER — HYDROMORPHONE HYDROCHLORIDE 1 MG/ML
0.6 INJECTION, SOLUTION INTRAMUSCULAR; INTRAVENOUS; SUBCUTANEOUS
Status: ACTIVE | OUTPATIENT
Start: 2017-06-13 | End: 2017-06-14

## 2017-06-13 RX ORDER — ONDANSETRON 2 MG/ML
4 INJECTION INTRAMUSCULAR; INTRAVENOUS ONCE AS NEEDED
Status: DISCONTINUED | OUTPATIENT
Start: 2017-06-13 | End: 2017-06-13 | Stop reason: HOSPADM

## 2017-06-13 RX ORDER — SODIUM CHLORIDE, SODIUM LACTATE, POTASSIUM CHLORIDE, CALCIUM CHLORIDE 600; 310; 30; 20 MG/100ML; MG/100ML; MG/100ML; MG/100ML
INJECTION, SOLUTION INTRAVENOUS CONTINUOUS
Status: DISCONTINUED | OUTPATIENT
Start: 2017-06-13 | End: 2017-06-13

## 2017-06-13 RX ORDER — SENNOSIDES 8.6 MG
17.2 TABLET ORAL NIGHTLY
Status: DISCONTINUED | OUTPATIENT
Start: 2017-06-13 | End: 2017-06-15

## 2017-06-13 RX ORDER — POLYETHYLENE GLYCOL 3350 17 G/17G
17 POWDER, FOR SOLUTION ORAL DAILY PRN
Status: DISCONTINUED | OUTPATIENT
Start: 2017-06-13 | End: 2017-06-15

## 2017-06-13 RX ORDER — METOCLOPRAMIDE 10 MG/1
10 TABLET ORAL ONCE
Status: COMPLETED | OUTPATIENT
Start: 2017-06-13 | End: 2017-06-13

## 2017-06-13 RX ORDER — DIPHENHYDRAMINE HYDROCHLORIDE 50 MG/ML
12.5 INJECTION INTRAMUSCULAR; INTRAVENOUS EVERY 4 HOURS PRN
Status: DISCONTINUED | OUTPATIENT
Start: 2017-06-13 | End: 2017-06-15

## 2017-06-13 RX ORDER — SODIUM CHLORIDE 9 MG/ML
INJECTION, SOLUTION INTRAVENOUS CONTINUOUS
Status: DISCONTINUED | OUTPATIENT
Start: 2017-06-13 | End: 2017-06-15

## 2017-06-13 RX ORDER — MORPHINE SULFATE 4 MG/ML
4 INJECTION, SOLUTION INTRAMUSCULAR; INTRAVENOUS EVERY 10 MIN PRN
Status: DISCONTINUED | OUTPATIENT
Start: 2017-06-13 | End: 2017-06-13 | Stop reason: HOSPADM

## 2017-06-13 RX ORDER — HYDROMORPHONE HYDROCHLORIDE 1 MG/ML
0.4 INJECTION, SOLUTION INTRAMUSCULAR; INTRAVENOUS; SUBCUTANEOUS EVERY 5 MIN PRN
Status: DISCONTINUED | OUTPATIENT
Start: 2017-06-13 | End: 2017-06-13 | Stop reason: HOSPADM

## 2017-06-13 RX ORDER — HYDROCODONE BITARTRATE AND ACETAMINOPHEN 7.5; 325 MG/1; MG/1
1 TABLET ORAL EVERY 6 HOURS PRN
Status: DISPENSED | OUTPATIENT
Start: 2017-06-13 | End: 2017-06-14

## 2017-06-13 RX ORDER — BISACODYL 10 MG
10 SUPPOSITORY, RECTAL RECTAL
Status: DISCONTINUED | OUTPATIENT
Start: 2017-06-13 | End: 2017-06-15

## 2017-06-13 RX ORDER — METOCLOPRAMIDE HYDROCHLORIDE 5 MG/ML
10 INJECTION INTRAMUSCULAR; INTRAVENOUS EVERY 6 HOURS PRN
Status: ACTIVE | OUTPATIENT
Start: 2017-06-13 | End: 2017-06-15

## 2017-06-13 RX ORDER — LISINOPRIL AND HYDROCHLOROTHIAZIDE 20; 12.5 MG/1; MG/1
1 TABLET ORAL DAILY
Status: DISCONTINUED | OUTPATIENT
Start: 2017-06-13 | End: 2017-06-13 | Stop reason: SDUPTHER

## 2017-06-13 RX ORDER — NEOSTIGMINE METHYLSULFATE 0.5 MG/ML
INJECTION INTRAVENOUS AS NEEDED
Status: DISCONTINUED | OUTPATIENT
Start: 2017-06-13 | End: 2017-06-13 | Stop reason: SURG

## 2017-06-13 RX ORDER — PROPAFENONE HYDROCHLORIDE 325 MG/1
325 CAPSULE, EXTENDED RELEASE ORAL EVERY 12 HOURS
Status: DISCONTINUED | OUTPATIENT
Start: 2017-06-13 | End: 2017-06-15

## 2017-06-13 RX ORDER — DIPHENHYDRAMINE HYDROCHLORIDE 50 MG/ML
25 INJECTION INTRAMUSCULAR; INTRAVENOUS ONCE AS NEEDED
Status: ACTIVE | OUTPATIENT
Start: 2017-06-13 | End: 2017-06-13

## 2017-06-13 RX ORDER — DOXEPIN HYDROCHLORIDE 50 MG/1
1 CAPSULE ORAL DAILY
Status: DISCONTINUED | OUTPATIENT
Start: 2017-06-13 | End: 2017-06-15

## 2017-06-13 RX ORDER — SODIUM CHLORIDE 0.9 % (FLUSH) 0.9 %
10 SYRINGE (ML) INJECTION AS NEEDED
Status: DISCONTINUED | OUTPATIENT
Start: 2017-06-13 | End: 2017-06-15

## 2017-06-13 RX ORDER — PHENYLEPHRINE HCL 10 MG/ML
VIAL (ML) INJECTION AS NEEDED
Status: DISCONTINUED | OUTPATIENT
Start: 2017-06-13 | End: 2017-06-13 | Stop reason: SURG

## 2017-06-13 RX ORDER — HALOPERIDOL 5 MG/ML
0.25 INJECTION INTRAMUSCULAR ONCE AS NEEDED
Status: DISCONTINUED | OUTPATIENT
Start: 2017-06-13 | End: 2017-06-13 | Stop reason: HOSPADM

## 2017-06-13 RX ORDER — HYDROCODONE BITARTRATE AND ACETAMINOPHEN 5; 325 MG/1; MG/1
1 TABLET ORAL AS NEEDED
Status: DISCONTINUED | OUTPATIENT
Start: 2017-06-13 | End: 2017-06-13 | Stop reason: HOSPADM

## 2017-06-13 RX ORDER — DEXAMETHASONE SODIUM PHOSPHATE 4 MG/ML
VIAL (ML) INJECTION AS NEEDED
Status: DISCONTINUED | OUTPATIENT
Start: 2017-06-13 | End: 2017-06-13 | Stop reason: SURG

## 2017-06-13 RX ORDER — HYDROMORPHONE HYDROCHLORIDE 1 MG/ML
0.2 INJECTION, SOLUTION INTRAMUSCULAR; INTRAVENOUS; SUBCUTANEOUS EVERY 2 HOUR PRN
Status: DISCONTINUED | OUTPATIENT
Start: 2017-06-13 | End: 2017-06-15

## 2017-06-13 RX ORDER — BUPIVACAINE HYDROCHLORIDE 7.5 MG/ML
INJECTION, SOLUTION INTRASPINAL AS NEEDED
Status: DISCONTINUED | OUTPATIENT
Start: 2017-06-13 | End: 2017-06-13 | Stop reason: SURG

## 2017-06-13 RX ORDER — HYDROMORPHONE HYDROCHLORIDE 1 MG/ML
0.4 INJECTION, SOLUTION INTRAMUSCULAR; INTRAVENOUS; SUBCUTANEOUS
Status: ACTIVE | OUTPATIENT
Start: 2017-06-13 | End: 2017-06-14

## 2017-06-13 RX ORDER — SODIUM PHOSPHATE, DIBASIC AND SODIUM PHOSPHATE, MONOBASIC 7; 19 G/133ML; G/133ML
1 ENEMA RECTAL ONCE AS NEEDED
Status: DISCONTINUED | OUTPATIENT
Start: 2017-06-13 | End: 2017-06-15

## 2017-06-13 RX ORDER — ACETAMINOPHEN 325 MG/1
650 TABLET ORAL EVERY 6 HOURS PRN
Status: ACTIVE | OUTPATIENT
Start: 2017-06-13 | End: 2017-06-14

## 2017-06-13 RX ORDER — SODIUM CHLORIDE, SODIUM LACTATE, POTASSIUM CHLORIDE, CALCIUM CHLORIDE 600; 310; 30; 20 MG/100ML; MG/100ML; MG/100ML; MG/100ML
INJECTION, SOLUTION INTRAVENOUS CONTINUOUS
Status: DISCONTINUED | OUTPATIENT
Start: 2017-06-13 | End: 2017-06-15

## 2017-06-13 RX ORDER — ACETAMINOPHEN 325 MG/1
650 TABLET ORAL ONCE
Status: COMPLETED | OUTPATIENT
Start: 2017-06-13 | End: 2017-06-13

## 2017-06-13 RX ORDER — MORPHINE SULFATE 1 MG/ML
INJECTION, SOLUTION EPIDURAL; INTRATHECAL; INTRAVENOUS AS NEEDED
Status: DISCONTINUED | OUTPATIENT
Start: 2017-06-13 | End: 2017-06-13 | Stop reason: SURG

## 2017-06-13 RX ORDER — LEVOTHYROXINE SODIUM 0.1 MG/1
100 TABLET ORAL
Status: DISCONTINUED | OUTPATIENT
Start: 2017-06-14 | End: 2017-06-15

## 2017-06-13 RX ORDER — DIPHENHYDRAMINE HCL 25 MG
25 CAPSULE ORAL EVERY 4 HOURS PRN
Status: DISCONTINUED | OUTPATIENT
Start: 2017-06-13 | End: 2017-06-15

## 2017-06-13 RX ORDER — ROCURONIUM BROMIDE 10 MG/ML
INJECTION, SOLUTION INTRAVENOUS AS NEEDED
Status: DISCONTINUED | OUTPATIENT
Start: 2017-06-13 | End: 2017-06-13 | Stop reason: SURG

## 2017-06-13 RX ORDER — NALOXONE HYDROCHLORIDE 0.4 MG/ML
80 INJECTION, SOLUTION INTRAMUSCULAR; INTRAVENOUS; SUBCUTANEOUS AS NEEDED
Status: DISCONTINUED | OUTPATIENT
Start: 2017-06-13 | End: 2017-06-13 | Stop reason: HOSPADM

## 2017-06-13 RX ORDER — ONDANSETRON 2 MG/ML
4 INJECTION INTRAMUSCULAR; INTRAVENOUS EVERY 4 HOURS PRN
Status: DISCONTINUED | OUTPATIENT
Start: 2017-06-13 | End: 2017-06-15

## 2017-06-13 RX ORDER — MORPHINE SULFATE 2 MG/ML
2 INJECTION, SOLUTION INTRAMUSCULAR; INTRAVENOUS EVERY 10 MIN PRN
Status: DISCONTINUED | OUTPATIENT
Start: 2017-06-13 | End: 2017-06-13 | Stop reason: HOSPADM

## 2017-06-13 RX ORDER — DIPHENHYDRAMINE HYDROCHLORIDE 50 MG/ML
12.5 INJECTION INTRAMUSCULAR; INTRAVENOUS EVERY 4 HOURS PRN
Status: ACTIVE | OUTPATIENT
Start: 2017-06-13 | End: 2017-06-14

## 2017-06-13 RX ORDER — HYDROCODONE BITARTRATE AND ACETAMINOPHEN 10; 325 MG/1; MG/1
1 TABLET ORAL EVERY 4 HOURS PRN
Status: DISCONTINUED | OUTPATIENT
Start: 2017-06-13 | End: 2017-06-15

## 2017-06-13 RX ORDER — CALCIUM CARBONATE 200(500)MG
1000 TABLET,CHEWABLE ORAL DAILY
Status: DISCONTINUED | OUTPATIENT
Start: 2017-06-13 | End: 2017-06-15

## 2017-06-13 RX ORDER — BACITRACIN 50000 [USP'U]/1
INJECTION, POWDER, LYOPHILIZED, FOR SOLUTION INTRAMUSCULAR AS NEEDED
Status: DISCONTINUED | OUTPATIENT
Start: 2017-06-13 | End: 2017-06-13 | Stop reason: HOSPADM

## 2017-06-13 RX ORDER — HYDROMORPHONE HYDROCHLORIDE 1 MG/ML
0.8 INJECTION, SOLUTION INTRAMUSCULAR; INTRAVENOUS; SUBCUTANEOUS EVERY 2 HOUR PRN
Status: DISCONTINUED | OUTPATIENT
Start: 2017-06-13 | End: 2017-06-15

## 2017-06-13 RX ORDER — GLYCOPYRROLATE 0.2 MG/ML
INJECTION INTRAMUSCULAR; INTRAVENOUS AS NEEDED
Status: DISCONTINUED | OUTPATIENT
Start: 2017-06-13 | End: 2017-06-13 | Stop reason: SURG

## 2017-06-13 RX ORDER — HYDROMORPHONE HYDROCHLORIDE 1 MG/ML
0.2 INJECTION, SOLUTION INTRAMUSCULAR; INTRAVENOUS; SUBCUTANEOUS EVERY 5 MIN PRN
Status: DISCONTINUED | OUTPATIENT
Start: 2017-06-13 | End: 2017-06-13 | Stop reason: HOSPADM

## 2017-06-13 RX ORDER — PRAVASTATIN SODIUM 20 MG
10 TABLET ORAL NIGHTLY
Status: DISCONTINUED | OUTPATIENT
Start: 2017-06-13 | End: 2017-06-15

## 2017-06-13 RX ORDER — LEVOTHYROXINE SODIUM 88 UG/1
88 TABLET ORAL
Status: DISCONTINUED | OUTPATIENT
Start: 2017-06-14 | End: 2017-06-13

## 2017-06-13 RX ORDER — HYDROCODONE BITARTRATE AND ACETAMINOPHEN 5; 325 MG/1; MG/1
2 TABLET ORAL AS NEEDED
Status: DISCONTINUED | OUTPATIENT
Start: 2017-06-13 | End: 2017-06-13 | Stop reason: HOSPADM

## 2017-06-13 RX ORDER — HYDROMORPHONE HYDROCHLORIDE 1 MG/ML
0.4 INJECTION, SOLUTION INTRAMUSCULAR; INTRAVENOUS; SUBCUTANEOUS EVERY 2 HOUR PRN
Status: DISCONTINUED | OUTPATIENT
Start: 2017-06-13 | End: 2017-06-15

## 2017-06-13 RX ORDER — MORPHINE SULFATE 10 MG/ML
6 INJECTION, SOLUTION INTRAMUSCULAR; INTRAVENOUS EVERY 10 MIN PRN
Status: DISCONTINUED | OUTPATIENT
Start: 2017-06-13 | End: 2017-06-13 | Stop reason: HOSPADM

## 2017-06-13 RX ORDER — DOCUSATE SODIUM 100 MG/1
100 CAPSULE, LIQUID FILLED ORAL 2 TIMES DAILY
Status: DISCONTINUED | OUTPATIENT
Start: 2017-06-13 | End: 2017-06-15

## 2017-06-13 RX ORDER — HYDROMORPHONE HYDROCHLORIDE 1 MG/ML
0.6 INJECTION, SOLUTION INTRAMUSCULAR; INTRAVENOUS; SUBCUTANEOUS EVERY 5 MIN PRN
Status: DISCONTINUED | OUTPATIENT
Start: 2017-06-13 | End: 2017-06-13 | Stop reason: HOSPADM

## 2017-06-13 RX ORDER — LIDOCAINE HYDROCHLORIDE 10 MG/ML
INJECTION, SOLUTION EPIDURAL; INFILTRATION; INTRACAUDAL; PERINEURAL AS NEEDED
Status: DISCONTINUED | OUTPATIENT
Start: 2017-06-13 | End: 2017-06-13 | Stop reason: SURG

## 2017-06-13 RX ORDER — FAMOTIDINE 20 MG/1
20 TABLET ORAL ONCE
Status: COMPLETED | OUTPATIENT
Start: 2017-06-13 | End: 2017-06-13

## 2017-06-13 RX ORDER — HALOPERIDOL 5 MG/ML
0.5 INJECTION INTRAMUSCULAR ONCE AS NEEDED
Status: ACTIVE | OUTPATIENT
Start: 2017-06-13 | End: 2017-06-13

## 2017-06-13 RX ORDER — ONDANSETRON 2 MG/ML
4 INJECTION INTRAMUSCULAR; INTRAVENOUS ONCE AS NEEDED
Status: ACTIVE | OUTPATIENT
Start: 2017-06-13 | End: 2017-06-13

## 2017-06-13 RX ORDER — NALOXONE HYDROCHLORIDE 0.4 MG/ML
0.08 INJECTION, SOLUTION INTRAMUSCULAR; INTRAVENOUS; SUBCUTANEOUS
Status: ACTIVE | OUTPATIENT
Start: 2017-06-13 | End: 2017-06-14

## 2017-06-13 RX ORDER — CELECOXIB 200 MG/1
200 CAPSULE ORAL EVERY 12 HOURS SCHEDULED
Status: DISCONTINUED | OUTPATIENT
Start: 2017-06-13 | End: 2017-06-15

## 2017-06-13 RX ORDER — NALBUPHINE HCL 10 MG/ML
2.5 AMPUL (ML) INJECTION EVERY 4 HOURS PRN
Status: ACTIVE | OUTPATIENT
Start: 2017-06-13 | End: 2017-06-14

## 2017-06-13 RX ADMIN — PHENYLEPHRINE HCL 50 MCG: 10 MG/ML VIAL (ML) INJECTION at 12:55:00

## 2017-06-13 RX ADMIN — PHENYLEPHRINE HCL 100 MCG: 10 MG/ML VIAL (ML) INJECTION at 11:55:00

## 2017-06-13 RX ADMIN — MORPHINE SULFATE 0.2 MG: 1 INJECTION, SOLUTION EPIDURAL; INTRATHECAL; INTRAVENOUS at 11:21:00

## 2017-06-13 RX ADMIN — PHENYLEPHRINE HCL 100 MCG: 10 MG/ML VIAL (ML) INJECTION at 12:05:00

## 2017-06-13 RX ADMIN — SODIUM CHLORIDE, SODIUM LACTATE, POTASSIUM CHLORIDE, CALCIUM CHLORIDE: 600; 310; 30; 20 INJECTION, SOLUTION INTRAVENOUS at 11:14:00

## 2017-06-13 RX ADMIN — NEOSTIGMINE METHYLSULFATE 3 MG: 0.5 INJECTION INTRAVENOUS at 13:40:00

## 2017-06-13 RX ADMIN — LIDOCAINE HYDROCHLORIDE 50 MG: 10 INJECTION, SOLUTION EPIDURAL; INFILTRATION; INTRACAUDAL; PERINEURAL at 11:24:00

## 2017-06-13 RX ADMIN — PHENYLEPHRINE HCL 100 MCG: 10 MG/ML VIAL (ML) INJECTION at 13:00:00

## 2017-06-13 RX ADMIN — PHENYLEPHRINE HCL 100 MCG: 10 MG/ML VIAL (ML) INJECTION at 12:51:00

## 2017-06-13 RX ADMIN — PHENYLEPHRINE HCL 50 MCG: 10 MG/ML VIAL (ML) INJECTION at 13:05:00

## 2017-06-13 RX ADMIN — PHENYLEPHRINE HCL 100 MCG: 10 MG/ML VIAL (ML) INJECTION at 12:25:00

## 2017-06-13 RX ADMIN — GLYCOPYRROLATE 0.4 MG: 0.2 INJECTION INTRAMUSCULAR; INTRAVENOUS at 13:40:00

## 2017-06-13 RX ADMIN — SODIUM CHLORIDE, SODIUM LACTATE, POTASSIUM CHLORIDE, CALCIUM CHLORIDE: 600; 310; 30; 20 INJECTION, SOLUTION INTRAVENOUS at 13:55:00

## 2017-06-13 RX ADMIN — PHENYLEPHRINE HCL 100 MCG: 10 MG/ML VIAL (ML) INJECTION at 12:20:00

## 2017-06-13 RX ADMIN — BUPIVACAINE HYDROCHLORIDE 1 ML: 7.5 INJECTION, SOLUTION INTRASPINAL at 11:21:00

## 2017-06-13 RX ADMIN — ROCURONIUM BROMIDE 30 MG: 10 INJECTION, SOLUTION INTRAVENOUS at 11:24:00

## 2017-06-13 RX ADMIN — DEXAMETHASONE SODIUM PHOSPHATE 4 MG: 4 MG/ML VIAL (ML) INJECTION at 11:24:00
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
## 2017-06-13 NOTE — BRIEF OP NOTE
Pre-Operative Diagnosis: 1) Osteoarthritis right hip, 2) obesity (BMI 32.7)     Post-Operative Diagnosis: Same     Procedure Performed: Right total hip arthroplasty (posterior approach)    Surgeon(s) and Role: Yelena Jasmine MD - Primary    Assistant

## 2017-06-13 NOTE — ANESTHESIA PREPROCEDURE EVALUATION
Anesthesia PreOp Note    HPI:     Dorys Moreno is a 81year old female who presents for preoperative consultation requested by: Randi Max MD    Date of Surgery: 6/13/2017    Procedure(s):  HIP TOTAL REPLACEMENT  Indication: osteoarthritis rig vaccines. HYSTERECTOMY  age 44    [facility]. 3/4 romeved first. Not cancer. 2011: Bx. not enough tissue. Removed rest and benign.      COLONOSCOPY  2014    Comment Benign rachael outpatient prescriptions on file.       Aspirin                 Rash    Family History   Problem Relation Age of Onset   • Bipolar Disorder Daughter    • Cancer Daughter      MM.    • Melanoma Brother    • Other[other] [OTHER] Sister    • Colonic perf[other 1.499 m (4' 11\")   Weight: 74.844 kg (165 lb) 73.483 kg (162 lb)   SpO2:  93%        Anesthesia ROS/Med Hx and Physical Exam      No history of anesthetic complications   Airway   Mallampati: I  TM distance: >3 FB  Neck ROM: full  Dental - normal exam

## 2017-06-13 NOTE — ANESTHESIA PROCEDURE NOTES
Spinal Block  Performed by: Adryan Vasquez by: aPola Bacon    Patient Location:  OR  Start Time:  6/13/2017 11:19 AM  End Time:  6/13/2017 11:21 AM  Site identification: surface landmarks    Reason for Block: post-op pain management

## 2017-06-13 NOTE — PROGRESS NOTES
Therapeutic Interchange CALCIUM CARB-CHOLECALCIFEROL 600-800 MG-UNIT OR TABS  2 tablets daily  With CALCIUM CARBONATE 1000MG & CALCIFEROL 1500UNITS DAILY    GILMER SAINI. Ph.  [id number]  6/13/2017

## 2017-06-13 NOTE — INTERVAL H&P NOTE
Pre-op Diagnosis: osteoarthritis right hip    The above referenced H&P was reviewed by Love Oconnor MD on 6/13/2017, the patient was examined and no significant changes have occurred in the patient's condition since the H&P was performed.   I discussed

## 2017-06-13 NOTE — ANESTHESIA POSTPROCEDURE EVALUATION
Patient: Ayleen Ask    Procedure Summary     Date Anesthesia Start Anesthesia Stop Room / Location    06/13/17 1111  [facility] MAIN OR 07 / [facility] MAIN OR       Procedure Diagnosis Surgeon Responsible Provider    HIP TOTAL REPLACEMENT (Right ) (osteoarthrit

## 2017-06-14 PROBLEM — D62 ANEMIA ASSOCIATED WITH ACUTE BLOOD LOSS: Status: ACTIVE | Noted: 2017-06-14

## 2017-06-14 PROCEDURE — 99232 SBSQ HOSP IP/OBS MODERATE 35: CPT | Performed by: INTERNAL MEDICINE

## 2017-06-14 NOTE — OCCUPATIONAL THERAPY NOTE
OCCUPATIONAL THERAPY EVALUATION - INPATIENT      Room Number: 435/435-A  Evaluation Date: 6/14/2017  Type of Evaluation: Initial  Presenting Problem:  (R BEAU-posterior)    Physician Order: IP Consult to Occupational Therapy  Reason for Therapy: ADL/IADL Dy Hyperlipidemia    Hypothyroidism    PAF (paroxysmal atrial fibrillation) (HCC)    A-fib (HCC)    Anemia associated with acute blood loss      Past Medical History  Past Medical History   Diagnosis Date   • Unspecified essential hypertension    • Other and Pt reports she was independent with ADLs, IADLs, driving prior to surgery. SUBJECTIVE  \"My daughter will be able to work from home. \"     Patient self-stated goal is: Recover from surgery    OCCUPATIONAL THERAPY EXAMINATION     OBJECTIVE  Precautions: n/t   Toileting: n/t; sat on toilet, unable to go at this time  Upper Body Dressing: set up  Lower Body Dressing: min.  A (educated on adaptive equipment with hip precautions)      Education Provided: Role of OT, posterior hip precautions, ADLs, adaptive eq

## 2017-06-14 NOTE — DISCHARGE PLANNING
LAST met with the pt. At bedside. The pt. Lives in a 3rd floor condo with her dtr. In an elevator building. The pt. Reports being independent prior to admission with adls, ambulation and driving. The pt.  Is uncertain of where she would like to go when med

## 2017-06-14 NOTE — PLAN OF CARE
DISCHARGE PLANNING    • Discharge to home or other facility with appropriate resources Progressing        Plan is for patient to be discharged to home with home health tomorrow  PAIN - ADULT    • Verbalizes/displays adequate comfort level or patient's stat

## 2017-06-14 NOTE — PROGRESS NOTES
[facility]    Progress Note    Jarocho Maldonado Patient Status:  Inpatient    1935 MRN [id number]   Location [facility] 4W/SW/SE Attending Haider Singh MD   [facility] Day # 1 PCP Trent Austin.  Andreia Gamboa MD     Subjective:     Co

## 2017-06-14 NOTE — PHYSICAL THERAPY NOTE
PHYSICAL THERAPY HIP EVALUATION - INPATIENT     Room Number: 435/435-A  Evaluation Date: 6/14/2017  Type of Evaluation: Initial  Physician Order: PT Eval and Treat    Presenting Problem: Right BEAU, posterior prec  Reason for Therapy: Mobility Dysfunction a requires occasional assist. Patient reports good pain control. Family education and review of BEAU handout also performed today with daughter able to verbalize understanding of hip precautions and assisting patient at home.  Anticipate patient will be able t Osteoarthritis    • Personal history of antineoplastic chemotherapy      40 YRS AGO   • Arrhythmia      A FIB       Past Surgical History      Past Surgical History    TONSILLECTOMY      REMOVAL GALLBLADDER      APPENDECTOMY      TOTAL ABDOM HYSTERECTOMY functional limits     Lower extremity strength is within functional limits, right hip 3+/5    BALANCE  Static Sitting: Good  Dynamic Sitting: Fair -  Static Standing: Fair -  Dynamic Standing: Poor +    ACTIVITY TOLERANCE  O2 Saturation: 98%  Room air  Hea addressed    CURRENT GOALS    Goals to be met by: 6/17/17   Patient Goal Patient's self-stated goal is: to go home   Goal #1 Patient is able to demonstrate supine - sit EOB @ level: modified independent   Goal #1   Current Status    Goal #2 Patient is able

## 2017-06-14 NOTE — PROGRESS NOTES
[place] [facility] - [facility]    Progress Note    Jurline Ask Patient Status:  Inpatient    1935 MRN [id number]   Location [facility] 4W/SW/SE Attending Víctor Donato MD   [address] Day # 1 PCP Aristides Reece.  Casa Owens MD     Subjective:     Co controlled  Monitor      Hyperlipidemia  Chol 184   Trig 207   HDL 73  LDL 70  Pravastatin      Hypothyroidism  TSH 5.10  Synthroid      A-fib (HCC)  Tele: NSR with isolated PACs and MF PVCs 65-94  Xarelto  Rythmol      Anemia associated with acute blood l

## 2017-06-14 NOTE — OPERATIVE REPORT
[facility]    PATIENT'S NAME: Mitchell Casanova   ATTENDING PHYSICIAN: Tevin Gillis MD   OPERATING PHYSICIAN: Sophia Gillis MD   PATIENT ACCOUNT#:   [id number]    LOCATION:  [facility] [address] [place] RECORD #:   [id number]       DATE OF B was placed with Duramorph, the patient was placed under general endotracheal anesthesia and placed in the left lateral decubitus position on the pegboard. She was well-padded. The down leg had a good pulse.   The right hip and leg were then prepped and  extended 10 degrees and externally rotated to 70 degrees. Pistoning did not clear the head. Length gain was minimal based on our measurement with the greater trochanter and Steinmann pin.   In addition,  the piriformis capsule did not show any significant

## 2017-06-14 NOTE — PROGRESS NOTES
[place] [facility] - [facility]    Progress Note    Ahsan Band Patient Status:  Inpatient    1935 MRN [id number]   Location [facility] 4W/SW/SE Attending Coretta Carmen MD   [facility] Day # 1 PCP Bessie Ellsworth.  Gerson Serrato MD     Subjective:   Georgiana Flores are noted. * No obvious complication.                        Annia Booker MD  6/14/2017

## 2017-06-14 NOTE — ANESTHESIA POST-OP FOLLOW-UP NOTE
[facility]OSP - [facility]   Acute Pain Rounds Note  2017    Patient name: Abdullahi Wu 81year old female  : 1935  MRN: [id number]    Diagnosis: Primary osteoarthritis of right hip  (primary encounter diagnosis)  Right hip pain  Obesity

## 2017-06-15 ENCOUNTER — TELEPHONE (OUTPATIENT)
Dept: INTERNAL MEDICINE CLINIC | Facility: CLINIC | Age: 82
End: 2017-06-15

## 2017-06-15 VITALS
HEART RATE: 95 BPM | BODY MASS INDEX: 32.66 KG/M2 | TEMPERATURE: 97 F | DIASTOLIC BLOOD PRESSURE: 64 MMHG | HEIGHT: 59 IN | WEIGHT: 162 LBS | OXYGEN SATURATION: 94 % | RESPIRATION RATE: 16 BRPM | SYSTOLIC BLOOD PRESSURE: 152 MMHG

## 2017-06-15 PROCEDURE — 99238 HOSP IP/OBS DSCHRG MGMT 30/<: CPT | Performed by: INTERNAL MEDICINE

## 2017-06-15 RX ORDER — PSEUDOEPHEDRINE HCL 30 MG
100 TABLET ORAL 2 TIMES DAILY
Qty: 30 CAPSULE | Refills: 0 | Status: SHIPPED | OUTPATIENT
Start: 2017-06-15 | End: 2018-01-01

## 2017-06-15 RX ORDER — CELECOXIB 200 MG/1
200 CAPSULE ORAL EVERY 12 HOURS SCHEDULED
Qty: 60 CAPSULE | Refills: 0 | Status: SHIPPED | OUTPATIENT
Start: 2017-06-15 | End: 2017-06-15

## 2017-06-15 RX ORDER — POTASSIUM CHLORIDE 20 MEQ/1
40 TABLET, EXTENDED RELEASE ORAL ONCE
Status: COMPLETED | OUTPATIENT
Start: 2017-06-15 | End: 2017-06-15

## 2017-06-15 RX ORDER — POLYETHYLENE GLYCOL 3350 17 G/17G
17 POWDER, FOR SOLUTION ORAL DAILY
Status: DISCONTINUED | OUTPATIENT
Start: 2017-06-15 | End: 2017-06-15

## 2017-06-15 RX ORDER — CELECOXIB 200 MG/1
200 CAPSULE ORAL EVERY 12 HOURS SCHEDULED
Qty: 60 CAPSULE | Refills: 0 | Status: SHIPPED | OUTPATIENT
Start: 2017-06-15 | End: 2018-01-18 | Stop reason: ALTCHOICE

## 2017-06-15 RX ORDER — HYDROCODONE BITARTRATE AND ACETAMINOPHEN 10; 325 MG/1; MG/1
1 TABLET ORAL EVERY 4 HOURS PRN
Qty: 60 TABLET | Refills: 0 | Status: SHIPPED | OUTPATIENT
Start: 2017-06-15 | End: 2017-08-01

## 2017-06-15 RX ORDER — IRON POLYSACCHARIDE COMPLEX 150 MG
1 CAPSULE ORAL 2 TIMES DAILY
Status: DISCONTINUED | OUTPATIENT
Start: 2017-06-15 | End: 2017-06-15

## 2017-06-15 NOTE — DISCHARGE PLANNING
The plan is for the pt to discharge home today 6/15 with [facility] AT [facility]. Referral and update has been provided to [facility].       Juarez Ratliff [facility] ext [phone]

## 2017-06-15 NOTE — PROGRESS NOTES
[place] [facility] - [facility]    Progress Note    Viri Finger Patient Status:  Inpatient    1935 MRN [id number]   Location [facility] 4W/SW/SE Attending Leanna Conley MD   [address] Day # 2 PCP Filemon Tran.  Jared Gudino MD     Subjective:     Co controlled  Monitor      Hyperlipidemia  Chol 184   Trig 207   HDL 73  LDL 70  Pravastatin      Hypothyroidism  TSH 5.10  Synthroid      A-fib (HCC)  Tele: NSR with PACs and PVCs   Xarelto  Rythmol      Anemia associated with acute blood loss  Hgb 9.

## 2017-06-15 NOTE — PROGRESS NOTES
[facility]    Progress Note    Kimi [place] Patient Status:  Inpatient    1935 MRN [id number]   Location [facility] 4W/SW/SE Attending Kevin He MD   [address] Day # 2 PCP Gordon Watson.  Dioni Burger MD       Subjective:   Pearl De Luna * The prosthesis is well seated in the femur and well directed towards the prosthetic acetabulum. * Skin staples are noted. * No obvious complication.                  Carnation Lito, [address]  6/15/2017

## 2017-06-15 NOTE — PROGRESS NOTES
[place] [facility] - [facility]    Progress Note    Stuart Salinas Patient Status:  Inpatient    1935 MRN [id number]   Location [facility] 4W/SW/SE Attending Ashleigh Mcneal MD   [address] Day # 2 PCP Diana Clark.  Evelia Montoya MD     Subjective:     Co Pulmonary/Chest: Effort normal and breath sounds normal. No accessory muscle usage or stridor. No apnea, no tachypnea and no bradypnea. She is not intubated. No respiratory distress. She has no decreased breath sounds. She has no wheezes.  She has no rhonch PTT 32.6 05/26/2017   INR 1.4* 05/26/2017   TSH 5.10 06/12/2017                         Katrin Saeed MD  6/15/2017

## 2017-06-15 NOTE — PROGRESS NOTES
[place] [facility] - [facility]    Brief Note    Bill Chaudhari Patient Status:  Inpatient    1935 MRN [id number]   Location [facility] 4W/SW/SE Attending Bryant Jin MD   [facility] Day # 2 PCP Rich Hopkins.  Vania Saeed MD       Brief Note:   Pt. May

## 2017-06-15 NOTE — PHYSICAL THERAPY NOTE
PHYSICAL THERAPY HIP TREATMENT NOTE - INPATIENT    Room Number: 435/435-A            Presenting Problem: Right BEAU, posterior prec    Problem List  Principal Problem:    Primary osteoarthritis of right hip  Active Problems:    HTN (hypertension)    Hyperli MOBILITY  How much difficulty does the patient currently have. ..  -   Turning over in bed (including adjusting bedclothes, sheets and blankets)?: A Little   -   Sitting down on and standing up from a chair with arms (e.g., wheelchair, bedside commode, etc. ambulate 300 feet with assistive device at assistance level: modified independent    Goal #3   Current Status Pt 'am/300'pm sessions with the RW SBA.     Goal #4 Patient will negotiate 4 stairs with railings and one curb w/RW and supervision   Goal #

## 2017-06-15 NOTE — PHYSICAL THERAPY NOTE
Pt is being seen today BID for therapy. Pt performed stairs with AM with CGA. Pt is on track to dc to home with Roxane Mayes and assist once medically cleared.

## 2017-06-15 NOTE — HOME CARE LIAISON
FACILITY NAME AND DC DATE: [address] [place] [address] 6/15/17    BEDSIDE VISIT WITH: MET WITH PATIENT AT BEDSIDE TO [facility]    SERVICES ORDERED:RN/PT JOINT     VERIFIED PATIENT ADDRESS, PHONE NUMBER AND CAREGIVER    PCP IS DR. Nidia Tomlin

## 2017-06-15 NOTE — PLAN OF CARE
CARDIOVASCULAR - ADULT    • Maintains optimal cardiac output and hemodynamic stability Adequate for Discharge    • Absence of cardiac arrhythmias or at baseline Adequate for Discharge    Patient on telemetry monitoring box 53, no episodes reported througho

## 2017-06-16 NOTE — TELEPHONE ENCOUNTER
Spoke to pt. Informed pt and pts daughter of Dr. Wicho Sanchez message and recommendations regarding Iron tab. Pt and pts daughter verbalized understanding of whole message with agreement to plan. No further questions or requests at this time.

## 2017-06-19 NOTE — DISCHARGE SUMMARY
[facility]    PATIENT'S NAME: Suzette Matos   ATTENDING PHYSICIAN: Tevin Zambrano MD   PATIENT ACCOUNT#:   [id number]    LOCATION:  [address] [place], [place] [address]y RECORD #:   [id number]       YOB: 1935  ADMISSION DATE:       06/

## 2017-06-22 NOTE — DISCHARGE SUMMARY
[facility]  Discharge Summary    Viri Yusuf Patient Status:  Inpatient    1935 MRN [id number]   Location [facility] 4W/SW/SE Attending No att. providers found   [address] Day # 2 PCP Katrin Gudino MD     Date of Admission: 20 5.10  Synthroid      A-fib (HCC)  Tele: NSR with PACs and PVCs   Xarelto  Rythmol      Anemia associated with acute blood loss  Hgb 9.4  S/p R BEAU      Positive Urine Culture  + ESBL  Contact isoloation  Per ID- no treatment, Pt.  Is asymptomatic  Las R-1          Follow up Visits: Follow-up with me in 3 weeks    Follow up Labs: CBC in 1 week    Other Discharge Instructions:     Patricia Saucedo  6/22/2017  12:50 PM

## 2017-06-26 ENCOUNTER — OFFICE VISIT (OUTPATIENT)
Dept: INTERNAL MEDICINE CLINIC | Facility: CLINIC | Age: 82
End: 2017-06-26

## 2017-06-26 VITALS
HEART RATE: 97 BPM | WEIGHT: 166 LBS | TEMPERATURE: 98 F | HEIGHT: 55 IN | BODY MASS INDEX: 38.42 KG/M2 | OXYGEN SATURATION: 96 % | SYSTOLIC BLOOD PRESSURE: 111 MMHG | DIASTOLIC BLOOD PRESSURE: 69 MMHG

## 2017-06-26 DIAGNOSIS — I73.9 PVD (PERIPHERAL VASCULAR DISEASE) (HCC): ICD-10-CM

## 2017-06-26 DIAGNOSIS — E78.2 COMBINED HYPERLIPIDEMIA: ICD-10-CM

## 2017-06-26 DIAGNOSIS — I48.0 PAROXYSMAL ATRIAL FIBRILLATION (HCC): ICD-10-CM

## 2017-06-26 DIAGNOSIS — D64.89 ANEMIA DUE TO OTHER CAUSE: ICD-10-CM

## 2017-06-26 DIAGNOSIS — M85.80 OSTEOPENIA, UNSPECIFIED LOCATION: ICD-10-CM

## 2017-06-26 DIAGNOSIS — E55.9 VITAMIN D DEFICIENCY: Primary | ICD-10-CM

## 2017-06-26 DIAGNOSIS — E03.9 ACQUIRED HYPOTHYROIDISM: ICD-10-CM

## 2017-06-26 DIAGNOSIS — I10 ESSENTIAL HYPERTENSION: ICD-10-CM

## 2017-06-26 DIAGNOSIS — Z90.81 H/O SPLENECTOMY: ICD-10-CM

## 2017-06-26 PROBLEM — E66.01 SEVERE OBESITY (BMI >= 40) (HCC): Chronic | Status: ACTIVE | Noted: 2017-06-26

## 2017-06-26 PROBLEM — D64.9 ANEMIA: Status: ACTIVE | Noted: 2017-06-26

## 2017-06-26 PROCEDURE — 99495 TRANSJ CARE MGMT MOD F2F 14D: CPT | Performed by: INTERNAL MEDICINE

## 2017-06-26 NOTE — PATIENT INSTRUCTIONS
ASSESSMENT/PLAN:   Vitamin d deficiency  (primary encounter diagnosis) Stable. Check blood in 9-17. Essential hypertension Good control. Careful with diet and excercise at least 30 minutes 3-4 times a week.  Check blood pressures at different times on d

## 2017-06-26 NOTE — PROGRESS NOTES
HPI:    Patient ID: Rodríguez Mendes is a 81year old female. HPI     HPI:    Rodríguez Mendes is a 81year old female here today for hospital follow up. Discharge Date: 6/15/17  Hospital Discharge Diagnosis: THR. TCM Diagnosis:  THR R.    Moder Rfl: 2   simvastatin 5 MG Oral Tab Take 1 tablet (5 mg total) by mouth once daily. (Patient taking differently: Take 5 mg by mouth nightly.  ) Disp: 90 tablet Rfl: 1   Lisinopril-Hydrochlorothiazide 20-12.5 MG Oral Tab Take 1 tablet by mouth daily.  (Krissy Uterine cancer and S/P chemo.     Family History   Problem Relation Age of Onset   • Bipolar Disorder Daughter    • Cancer Daughter      MM.    • Melanoma Brother    • Other[other] Radha Spacollins Sister    • Colonic perf[other] [OTHER] Mother    • Bipolar Disorder iron. Taking colace with it. Nl BM's. Hypertension  Patient is here for follow up of hypertension. BP at home: not checked. But PT at home and same. Has been compliant with medications.   Exercise level: moderately active and has been following low salt MG Oral Tab Take 1 tablet (10 mg total) by mouth daily. Disp: 20 tablet Rfl: 0   celecoxib 200 MG Oral Cap Take 1 capsule (200 mg total) by mouth every 12 (twelve) hours. Disp: 60 capsule Rfl: 0   multivitamin Oral Tab Take 1 tablet by mouth daily.  Disp: Comment: DR. Leona Vizcarra  No date: APPENDECTOMY  No date: APPENDECTOMY  1-10: CATARACT Bilateral  No date: CHOLECYSTECTOMY  2014: COLONOSCOPY      Comment: Benign polyps.    age 44: HYSTERECTOMY  No date: REMOVAL GALLBLADDER  No date: REMOVAL SPLEEN, TOTAL breath sounds. She has no wheezes. She has no rhonchi. She has no rales. She exhibits no tenderness. Musculoskeletal: She exhibits no edema. Neurological: She is alert and oriented to person, place, and time. Skin: Skin is warm and dry.  She is not [employer]

## 2017-06-29 ENCOUNTER — TELEPHONE (OUTPATIENT)
Dept: INTERNAL MEDICINE CLINIC | Facility: CLINIC | Age: 82
End: 2017-06-29

## 2017-07-03 ENCOUNTER — TELEPHONE (OUTPATIENT)
Dept: INTERNAL MEDICINE CLINIC | Facility: CLINIC | Age: 82
End: 2017-07-03

## 2017-07-03 DIAGNOSIS — R94.39 ABNORMAL STRESS ECHO: Primary | ICD-10-CM

## 2017-07-03 DIAGNOSIS — R52 PAIN: Primary | ICD-10-CM

## 2017-07-05 ENCOUNTER — OFFICE VISIT (OUTPATIENT)
Dept: PHYSICAL THERAPY | Facility: HOSPITAL | Age: 82
End: 2017-07-05
Attending: ORTHOPAEDIC SURGERY
Payer: MEDICARE

## 2017-07-05 DIAGNOSIS — Z96.641 STATUS POST RIGHT HIP REPLACEMENT: ICD-10-CM

## 2017-07-05 PROCEDURE — 97110 THERAPEUTIC EXERCISES: CPT

## 2017-07-05 PROCEDURE — 97161 PT EVAL LOW COMPLEX 20 MIN: CPT

## 2017-07-05 NOTE — PROGRESS NOTES
POST-OP HIP EVALUATION:   Referring Physician: Dr. Eva Dailey  Diagnosis: S/P BEAU Date of Service: 7/5/2017     PATIENT SUMMARY:   Sherice Fontana is a 81year old y/o female who presents to therapy today. Current complaints include hip pain.    Pt describ anterior hip  Sensation: intact to soft touch  ROM:  Hip   Flexion: R 80; L wfl  Abduction: R 20; L wfl  ER: R NT ; L wfl  IR: R NT; L dec     Flexibility:  Hamstrings: R wfl; L wfl  Piriformis: R nt; L nt  Quads: R dec; L dec  Hip Flexor: R nt, L nt    [address] 98.3/100  Current status G Code: InitialMobility: Walking and Moving AroundCJ: 20-39% impaired, limited, or restricted  Goal status G Code:  Mobility: Walking and Moving AroundCI: 1%-19% impaired, limited, or restricted    Patient was advised of these findi

## 2017-07-11 ENCOUNTER — LAB ENCOUNTER (OUTPATIENT)
Dept: LAB | Facility: HOSPITAL | Age: 82
End: 2017-07-11
Attending: INTERNAL MEDICINE
Payer: MEDICARE

## 2017-07-11 ENCOUNTER — OFFICE VISIT (OUTPATIENT)
Dept: PHYSICAL THERAPY | Facility: HOSPITAL | Age: 82
End: 2017-07-11
Attending: ORTHOPAEDIC SURGERY
Payer: MEDICARE

## 2017-07-11 DIAGNOSIS — R52 PAIN: ICD-10-CM

## 2017-07-11 DIAGNOSIS — I10 ESSENTIAL HYPERTENSION, MALIGNANT: Primary | ICD-10-CM

## 2017-07-11 LAB
ANION GAP SERPL CALC-SCNC: 9 MMOL/L (ref 0–18)
BASOPHILS # BLD: 0.1 K/UL (ref 0–0.2)
BASOPHILS NFR BLD: 1 %
BUN SERPL-MCNC: 18 MG/DL (ref 8–20)
BUN/CREAT SERPL: 22.5 (ref 10–20)
CALCIUM SERPL-MCNC: 9.1 MG/DL (ref 8.5–10.5)
CHLORIDE SERPL-SCNC: 103 MMOL/L (ref 95–110)
CO2 SERPL-SCNC: 25 MMOL/L (ref 22–32)
CREAT SERPL-MCNC: 0.8 MG/DL (ref 0.5–1.5)
EOSINOPHIL # BLD: 0.4 K/UL (ref 0–0.7)
EOSINOPHIL NFR BLD: 6 %
ERYTHROCYTE [DISTWIDTH] IN BLOOD BY AUTOMATED COUNT: 14 % (ref 11–15)
GLUCOSE SERPL-MCNC: 88 MG/DL (ref 70–99)
HCT VFR BLD AUTO: 34.8 % (ref 35–48)
HGB BLD-MCNC: 11.7 G/DL (ref 12–16)
LYMPHOCYTES # BLD: 2.4 K/UL (ref 1–4)
LYMPHOCYTES NFR BLD: 34 %
MCH RBC QN AUTO: 30.6 PG (ref 27–32)
MCHC RBC AUTO-ENTMCNC: 33.6 G/DL (ref 32–37)
MCV RBC AUTO: 91 FL (ref 80–100)
MONOCYTES # BLD: 0.7 K/UL (ref 0–1)
MONOCYTES NFR BLD: 10 %
NEUTROPHILS # BLD AUTO: 3.5 K/UL (ref 1.8–7.7)
NEUTROPHILS NFR BLD: 50 %
OSMOLALITY UR CALC.SUM OF ELEC: 285 MOSM/KG (ref 275–295)
PLATELET # BLD AUTO: 378 K/UL (ref 140–400)
PMV BLD AUTO: 7.1 FL (ref 7.4–10.3)
POTASSIUM SERPL-SCNC: 4.1 MMOL/L (ref 3.3–5.1)
RBC # BLD AUTO: 3.83 M/UL (ref 3.7–5.4)
SODIUM SERPL-SCNC: 137 MMOL/L (ref 136–144)
WBC # BLD AUTO: 7.1 K/UL (ref 4–11)

## 2017-07-11 PROCEDURE — 36415 COLL VENOUS BLD VENIPUNCTURE: CPT

## 2017-07-11 PROCEDURE — 85025 COMPLETE CBC W/AUTO DIFF WBC: CPT

## 2017-07-11 PROCEDURE — 80048 BASIC METABOLIC PNL TOTAL CA: CPT

## 2017-07-11 PROCEDURE — 97110 THERAPEUTIC EXERCISES: CPT

## 2017-07-11 NOTE — PROGRESS NOTES
Diagnosis: S/P BEAU  Authorized # of Visits:  2/12  HMO         Next MD visit: none scheduled  Fall Risk: standard         Precautions: n/a           Medication Changes since last visit?: No  Subjective: Pt states that she had some pain due to the humidity.

## 2017-07-13 ENCOUNTER — NURSE ONLY (OUTPATIENT)
Dept: INTERNAL MEDICINE CLINIC | Facility: CLINIC | Age: 82
End: 2017-07-13

## 2017-07-13 ENCOUNTER — TELEPHONE (OUTPATIENT)
Dept: INTERNAL MEDICINE CLINIC | Facility: CLINIC | Age: 82
End: 2017-07-13

## 2017-07-13 PROCEDURE — 93000 ELECTROCARDIOGRAM COMPLETE: CPT | Performed by: INTERNAL MEDICINE

## 2017-07-13 PROCEDURE — 93005 ELECTROCARDIOGRAM TRACING: CPT | Performed by: INTERNAL MEDICINE

## 2017-07-18 ENCOUNTER — OFFICE VISIT (OUTPATIENT)
Dept: PHYSICAL THERAPY | Facility: HOSPITAL | Age: 82
End: 2017-07-18
Attending: ORTHOPAEDIC SURGERY
Payer: MEDICARE

## 2017-07-18 DIAGNOSIS — R52 PAIN: ICD-10-CM

## 2017-07-18 PROCEDURE — 97110 THERAPEUTIC EXERCISES: CPT | Performed by: PHYSICAL THERAPIST

## 2017-07-18 NOTE — PROGRESS NOTES
Diagnosis: S/P R BEAU  Authorized # of Visits:  3/12  HMO         Next MD visit: none scheduled  Fall Risk: standard         Precautions: BEAU precautions       Medication Changes since last visit?: No  Subjective: Pt states that she is doing well.      Pain

## 2017-07-21 ENCOUNTER — OFFICE VISIT (OUTPATIENT)
Dept: PHYSICAL THERAPY | Facility: HOSPITAL | Age: 82
End: 2017-07-21
Attending: ORTHOPAEDIC SURGERY
Payer: MEDICARE

## 2017-07-21 DIAGNOSIS — R52 PAIN: ICD-10-CM

## 2017-07-21 PROCEDURE — 97110 THERAPEUTIC EXERCISES: CPT | Performed by: PHYSICAL THERAPIST

## 2017-07-21 NOTE — PROGRESS NOTES
Diagnosis: S/P R BEAU  Authorized # of Visits:  4/12  HMO         Next MD visit: 9/1/17  Fall Risk: standard         Precautions: BEAU precautions       Medication Changes since last visit?: No  Subjective: Pt reports that she saw the MD and he said I can wa

## 2017-07-25 ENCOUNTER — OFFICE VISIT (OUTPATIENT)
Dept: PHYSICAL THERAPY | Facility: HOSPITAL | Age: 82
End: 2017-07-25
Attending: ORTHOPAEDIC SURGERY
Payer: MEDICARE

## 2017-07-25 DIAGNOSIS — R52 PAIN: ICD-10-CM

## 2017-07-25 PROCEDURE — 97110 THERAPEUTIC EXERCISES: CPT

## 2017-07-27 ENCOUNTER — OFFICE VISIT (OUTPATIENT)
Dept: PHYSICAL THERAPY | Facility: HOSPITAL | Age: 82
End: 2017-07-27
Attending: ORTHOPAEDIC SURGERY
Payer: MEDICARE

## 2017-07-27 DIAGNOSIS — R52 PAIN: ICD-10-CM

## 2017-07-27 PROCEDURE — 97110 THERAPEUTIC EXERCISES: CPT

## 2017-07-27 NOTE — PROGRESS NOTES
Diagnosis: S/P R BEAU  Authorized # of Visits:  6/12  HMO         Next MD visit: 9/1/17  Fall Risk: standard         Precautions: BEAU precautions       Medication Changes since last visit?: No  Subjective: Pt reports that she may try to sleep in her bed and

## 2017-07-29 ENCOUNTER — HOSPITAL ENCOUNTER (EMERGENCY)
Facility: HOSPITAL | Age: 82
Discharge: HOME | End: 2017-07-29
Attending: EMERGENCY MEDICINE
Payer: MEDICARE

## 2017-07-29 VITALS
HEART RATE: 101 BPM | SYSTOLIC BLOOD PRESSURE: 138 MMHG | RESPIRATION RATE: 20 BRPM | WEIGHT: 165 LBS | HEIGHT: 58 IN | DIASTOLIC BLOOD PRESSURE: 78 MMHG | TEMPERATURE: 98 F | OXYGEN SATURATION: 95 % | BODY MASS INDEX: 34.63 KG/M2

## 2017-07-29 DIAGNOSIS — S68.119A FINGERTIP AMPUTATION, INITIAL ENCOUNTER: Primary | ICD-10-CM

## 2017-07-29 PROCEDURE — 99283 EMERGENCY DEPT VISIT LOW MDM: CPT

## 2017-07-29 NOTE — ED PROVIDER NOTES
Patient Seen in: [facility] AND [facility] Emergency Department    History   Patient presents with:  Laceration Abrasion (integumentary)    Stated Complaint: Left 5th finger injury    HPI    56-year-old female presents for evaluation of finger injury.   Patient w by mouth every 4 (four) hours as needed. docusate sodium 100 MG Oral Cap,  Take 100 mg by mouth 2 (two) times daily. rivaroxaban 10 MG Oral Tab,  Take 1 tablet (10 mg total) by mouth daily.    celecoxib 200 MG Oral Cap,  Take 1 capsule (200 mg total) by (36.8 °C) (Oral)   Resp 20   Ht 147.3 cm (4' 10\")   Wt 74.8 kg   SpO2 95%   BMI 34.49 kg/m²         Physical Exam   Constitutional: She is oriented to person, place, and time. She appears well-developed and well-nourished. No distress.    HENT:   Head: Nor

## 2017-08-01 ENCOUNTER — OFFICE VISIT (OUTPATIENT)
Dept: PHYSICAL THERAPY | Facility: HOSPITAL | Age: 82
End: 2017-08-01
Attending: ORTHOPAEDIC SURGERY
Payer: MEDICARE

## 2017-08-01 ENCOUNTER — OFFICE VISIT (OUTPATIENT)
Dept: INTERNAL MEDICINE CLINIC | Facility: CLINIC | Age: 82
End: 2017-08-01

## 2017-08-01 VITALS
TEMPERATURE: 98 F | WEIGHT: 165.63 LBS | SYSTOLIC BLOOD PRESSURE: 104 MMHG | HEIGHT: 58.5 IN | HEART RATE: 98 BPM | DIASTOLIC BLOOD PRESSURE: 67 MMHG | BODY MASS INDEX: 33.84 KG/M2

## 2017-08-01 DIAGNOSIS — R52 PAIN: ICD-10-CM

## 2017-08-01 DIAGNOSIS — S61.209A CUT OF FINGER: Primary | ICD-10-CM

## 2017-08-01 PROBLEM — S61.219A CUT OF FINGER: Status: ACTIVE | Noted: 2017-08-01

## 2017-08-01 PROCEDURE — 99213 OFFICE O/P EST LOW 20 MIN: CPT | Performed by: INTERNAL MEDICINE

## 2017-08-01 PROCEDURE — G0463 HOSPITAL OUTPT CLINIC VISIT: HCPCS | Performed by: INTERNAL MEDICINE

## 2017-08-01 PROCEDURE — 97110 THERAPEUTIC EXERCISES: CPT

## 2017-08-01 NOTE — PATIENT INSTRUCTIONS
Cut of finger  (primary encounter diagnosis) left little finger , no sign of bleeding ,wound  cleaned , healing well, no sign of infection , apply topical abx  , if you noticing any redness , swelling ,follow up with us

## 2017-08-01 NOTE — PROGRESS NOTES
HPI:    Patient ID: Ta Olea is a 81year old female.     HPI she came I today for follow up from er    She stats that she cut her left little  finger on Saturday, she couldn't stop bleeding , she went to  Er , there was no need for suturing ,they docusate sodium 100 MG Oral Cap Take 100 mg by mouth 2 (two) times daily. Disp: 30 capsule Rfl: 0   rivaroxaban 10 MG Oral Tab Take 1 tablet (10 mg total) by mouth daily. Disp: 20 tablet Rfl: 0   multivitamin Oral Tab Take 1 tablet by mouth daily.  Disp:  R Comment: DR. Domo Connolly  No date: APPENDECTOMY  No date: APPENDECTOMY  1-10: CATARACT Bilateral  No date: CHOLECYSTECTOMY  2014: COLONOSCOPY      Comment: Benign polyps.    age 44: HYSTERECTOMY  No date: REMOVAL GALLBLADDER  No date: REMOVAL SPLEEN, TOTAL Mouth/Throat: Uvula is midline, oropharynx is clear and moist and mucous membranes are normal. Mucous membranes are not cyanotic. No oropharyngeal exudate, posterior oropharyngeal edema or posterior oropharyngeal erythema.    Eyes: Conjunctivae and EOM are No orders of the defined types were placed in this encounter.       Meds This Visit:  No prescriptions requested or ordered in this encounter    Imaging & Referrals:  None        TN#[id number]

## 2017-08-01 NOTE — PROGRESS NOTES
Diagnosis: S/P R BEAU  Authorized # of Visits:  7/12  HMO         Next MD visit: 9/1/17  Fall Risk: standard         Precautions: BEAU precautions       Medication Changes since last visit?: No  Subjective: \"I have been very lazy with the exercises since Th

## 2017-08-03 ENCOUNTER — OFFICE VISIT (OUTPATIENT)
Dept: PHYSICAL THERAPY | Facility: HOSPITAL | Age: 82
End: 2017-08-03
Attending: ORTHOPAEDIC SURGERY
Payer: MEDICARE

## 2017-08-03 DIAGNOSIS — R52 PAIN: ICD-10-CM

## 2017-08-03 PROCEDURE — 97110 THERAPEUTIC EXERCISES: CPT | Performed by: PHYSICAL THERAPIST

## 2017-08-03 NOTE — PROGRESS NOTES
Diagnosis: S/P R BEAU  Authorized # of Visits:  8/12  HMO         Next MD visit: 9/1/17  Fall Risk: standard         Precautions: BEAU precautions       Medication Changes since last visit?: No  Subjective: Pt reports that because of the humidity I feels navi

## 2017-08-07 ENCOUNTER — HOSPITAL ENCOUNTER (OUTPATIENT)
Dept: ULTRASOUND IMAGING | Facility: HOSPITAL | Age: 82
Discharge: HOME | End: 2017-08-07
Attending: INTERNAL MEDICINE
Payer: MEDICARE

## 2017-08-07 DIAGNOSIS — I73.9 PVD (PERIPHERAL VASCULAR DISEASE) (HCC): ICD-10-CM

## 2017-08-07 PROCEDURE — 93880 EXTRACRANIAL BILAT STUDY: CPT | Performed by: INTERNAL MEDICINE

## 2017-08-08 ENCOUNTER — OFFICE VISIT (OUTPATIENT)
Dept: PHYSICAL THERAPY | Facility: HOSPITAL | Age: 82
End: 2017-08-08
Attending: ORTHOPAEDIC SURGERY
Payer: MEDICARE

## 2017-08-08 DIAGNOSIS — R52 PAIN: ICD-10-CM

## 2017-08-08 PROCEDURE — 97110 THERAPEUTIC EXERCISES: CPT | Performed by: PHYSICAL THERAPIST

## 2017-08-08 NOTE — PROGRESS NOTES
Patient Name: Lethaniel Habermann, : 1935, MRN: [id number]   Date:  2017  Referring Physician:  Evelio Hernandez    Diagnosis: S/P R BEAU    Discharge note    Pt has attended 9 visits in Physical Therapy.      Progress Note Start Date: 17  En Walking and Moving Around, CI: 1%-19% impaired, limited, or restricted        Plan: Discharge from Physical therapy    Patient was advised of these findings, precautions, and treatment options and has agreed to actively participate in planning and for this

## 2017-08-10 ENCOUNTER — APPOINTMENT (OUTPATIENT)
Dept: PHYSICAL THERAPY | Facility: HOSPITAL | Age: 82
End: 2017-08-10
Attending: ORTHOPAEDIC SURGERY
Payer: MEDICARE

## 2017-08-11 RX ORDER — SIMVASTATIN 5 MG
5 TABLET ORAL NIGHTLY
Qty: 90 TABLET | Refills: 1 | Status: SHIPPED | OUTPATIENT
Start: 2017-08-11 | End: 2017-12-11

## 2017-08-11 RX ORDER — LEVOTHYROXINE SODIUM 88 UG/1
88 TABLET ORAL
Qty: 90 TABLET | Refills: 1 | Status: SHIPPED | OUTPATIENT
Start: 2017-08-11 | End: 2017-12-11

## 2017-08-11 RX ORDER — LISINOPRIL AND HYDROCHLOROTHIAZIDE 20; 12.5 MG/1; MG/1
1 TABLET ORAL NIGHTLY
Qty: 90 TABLET | Refills: 1 | Status: SHIPPED | OUTPATIENT
Start: 2017-08-11 | End: 2017-12-11

## 2017-08-11 NOTE — TELEPHONE ENCOUNTER
Current Outpatient Prescriptions:     •  simvastatin 5 MG Oral Tab, Take 1 tablet (5 mg total) by mouth once daily.  (Patient taking differently: Take 5 mg by mouth nightly.  ), Disp: 90 tablet, Rfl: 1    •  Lisinopril-Hydrochlorothiazide 20-12.5 MG Oral

## 2017-08-14 ENCOUNTER — APPOINTMENT (OUTPATIENT)
Dept: PHYSICAL THERAPY | Facility: HOSPITAL | Age: 82
End: 2017-08-14
Attending: FAMILY MEDICINE
Payer: MEDICARE

## 2017-08-17 ENCOUNTER — APPOINTMENT (OUTPATIENT)
Dept: PHYSICAL THERAPY | Facility: HOSPITAL | Age: 82
End: 2017-08-17
Attending: FAMILY MEDICINE
Payer: MEDICARE

## 2017-09-08 PROBLEM — R94.39 ABNORMAL STRESS ECHO: Status: RESOLVED | Noted: 2017-01-03 | Resolved: 2017-09-08

## 2017-09-08 PROBLEM — I48.0 PAF (PAROXYSMAL ATRIAL FIBRILLATION) (HCC): Status: RESOLVED | Noted: 2017-05-30 | Resolved: 2017-09-08

## 2017-09-08 PROBLEM — S61.219A CUT OF FINGER: Status: RESOLVED | Noted: 2017-08-01 | Resolved: 2017-09-08

## 2017-09-08 PROBLEM — R42 DIZZINESS: Status: RESOLVED | Noted: 2017-01-03 | Resolved: 2017-09-08

## 2017-09-08 PROBLEM — D64.9 ANEMIA: Status: RESOLVED | Noted: 2017-06-26 | Resolved: 2017-09-08

## 2017-09-08 PROBLEM — M25.551 RIGHT HIP PAIN: Status: RESOLVED | Noted: 2017-04-12 | Resolved: 2017-09-08

## 2017-09-08 PROBLEM — S61.209A CUT OF FINGER: Status: RESOLVED | Noted: 2017-08-01 | Resolved: 2017-09-08

## 2017-09-11 ENCOUNTER — OFFICE VISIT (OUTPATIENT)
Dept: INTERNAL MEDICINE CLINIC | Facility: CLINIC | Age: 82
End: 2017-09-11

## 2017-09-11 VITALS
OXYGEN SATURATION: 95 % | WEIGHT: 163 LBS | SYSTOLIC BLOOD PRESSURE: 130 MMHG | HEART RATE: 97 BPM | DIASTOLIC BLOOD PRESSURE: 79 MMHG | TEMPERATURE: 98 F | BODY MASS INDEX: 33 KG/M2

## 2017-09-11 DIAGNOSIS — Z90.81 H/O SPLENECTOMY: ICD-10-CM

## 2017-09-11 DIAGNOSIS — E03.9 ACQUIRED HYPOTHYROIDISM: ICD-10-CM

## 2017-09-11 DIAGNOSIS — R06.00 DOE (DYSPNEA ON EXERTION): ICD-10-CM

## 2017-09-11 DIAGNOSIS — E78.2 COMBINED HYPERLIPIDEMIA: ICD-10-CM

## 2017-09-11 DIAGNOSIS — I73.9 PVD (PERIPHERAL VASCULAR DISEASE) (HCC): ICD-10-CM

## 2017-09-11 DIAGNOSIS — E55.9 VITAMIN D DEFICIENCY: Primary | ICD-10-CM

## 2017-09-11 DIAGNOSIS — M85.80 OSTEOPENIA, UNSPECIFIED LOCATION: ICD-10-CM

## 2017-09-11 DIAGNOSIS — I10 ESSENTIAL HYPERTENSION: ICD-10-CM

## 2017-09-11 DIAGNOSIS — D64.9 ANEMIA, UNSPECIFIED TYPE: ICD-10-CM

## 2017-09-11 DIAGNOSIS — I48.0 PAROXYSMAL ATRIAL FIBRILLATION (HCC): ICD-10-CM

## 2017-09-11 LAB
BASOPHILS # BLD: 0.1 K/UL (ref 0–0.2)
BASOPHILS NFR BLD: 1 %
EOSINOPHIL # BLD: 0.2 K/UL (ref 0–0.7)
EOSINOPHIL NFR BLD: 2 %
ERYTHROCYTE [DISTWIDTH] IN BLOOD BY AUTOMATED COUNT: 13.7 % (ref 11–15)
HCT VFR BLD AUTO: 41.2 % (ref 35–48)
HGB BLD-MCNC: 13.8 G/DL (ref 12–16)
LYMPHOCYTES # BLD: 3.1 K/UL (ref 1–4)
LYMPHOCYTES NFR BLD: 39 %
MCH RBC QN AUTO: 30.3 PG (ref 27–32)
MCHC RBC AUTO-ENTMCNC: 33.5 G/DL (ref 32–37)
MCV RBC AUTO: 90.7 FL (ref 80–100)
MONOCYTES # BLD: 0.8 K/UL (ref 0–1)
MONOCYTES NFR BLD: 10 %
NEUTROPHILS # BLD AUTO: 3.8 K/UL (ref 1.8–7.7)
NEUTROPHILS NFR BLD: 48 %
PLATELET # BLD AUTO: 393 K/UL (ref 140–400)
PMV BLD AUTO: 7.8 FL (ref 7.4–10.3)
RBC # BLD AUTO: 4.54 M/UL (ref 3.7–5.4)
WBC # BLD AUTO: 7.9 K/UL (ref 4–11)

## 2017-09-11 PROCEDURE — 36415 COLL VENOUS BLD VENIPUNCTURE: CPT | Performed by: INTERNAL MEDICINE

## 2017-09-11 PROCEDURE — 99214 OFFICE O/P EST MOD 30 MIN: CPT | Performed by: INTERNAL MEDICINE

## 2017-09-11 PROCEDURE — G0463 HOSPITAL OUTPT CLINIC VISIT: HCPCS | Performed by: INTERNAL MEDICINE

## 2017-09-11 RX ORDER — RIVAROXABAN 20 MG/1
TABLET, FILM COATED ORAL
Refills: 1 | Status: ON HOLD | COMMUNITY
Start: 2017-08-18 | End: 2018-01-01

## 2017-09-11 RX ORDER — INFLUENZA A VIRUSA/MICHIGAN/45/2015 X-275 (H1N1) ANTIGEN (FORMALDEHYDE INACTIVATED), INFLUENZA A VIRUS A/HONG KONG/4801/2014 X-263B (H3N2) ANTIGEN (FORMALDEHYDE INACTIVATED), AND INFLUENZA B VIRUS B/BRISBANE/60/2008 ANTIGEN (FORMALDEHYDE INACTIVATED) 60; 60; 60 UG/.5ML; UG/.5ML; UG/.5ML
INJECTION, SUSPENSION INTRAMUSCULAR
Refills: 0 | COMMUNITY
Start: 2017-09-01 | End: 2017-12-11 | Stop reason: ALTCHOICE
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
## 2017-09-11 NOTE — PATIENT INSTRUCTIONS
ASSESSMENT/PLAN:   Vitamin d deficiency  (primary encounter diagnosis) Taking vitamin D 2 tabs a day. Check blood. Acquired hypothyroidism Clinically and biochemically euthyroid. Essential hypertension Good. Control.  Careful with diet and excercise

## 2017-09-13 LAB — 25(OH)D3 SERPL-MCNC: 34.4 NG/ML

## 2017-09-27 ENCOUNTER — HOSPITAL ENCOUNTER (OUTPATIENT)
Dept: RESPIRATORY THERAPY | Facility: HOSPITAL | Age: 82
Discharge: HOME | End: 2017-09-27
Attending: INTERNAL MEDICINE
Payer: MEDICARE

## 2017-09-27 PROCEDURE — 94726 PLETHYSMOGRAPHY LUNG VOLUMES: CPT | Performed by: INTERNAL MEDICINE

## 2017-09-27 PROCEDURE — 94060 EVALUATION OF WHEEZING: CPT | Performed by: INTERNAL MEDICINE

## 2017-09-27 PROCEDURE — 94729 DIFFUSING CAPACITY: CPT | Performed by: INTERNAL MEDICINE

## 2017-10-03 NOTE — PROCEDURES
Full pulmonary function test with bronchodilator    Spirometry :   FEV1 1.29 L which is 84%  FVC 1.82 L which is 91%  FEV1/FVC 71% which is a slightly reduced    Flow volume loop slight scooping at the end of expiration    No significant bronchodilator res

## 2017-10-03 NOTE — ADDENDUM NOTE
Encounter addended by: Holly Serna MD on: 10/3/2017  6:05 PM<BR>    Actions taken: Sign clinical note, Charge Capture section accepted

## 2017-10-04 ENCOUNTER — TELEPHONE (OUTPATIENT)
Dept: INTERNAL MEDICINE CLINIC | Facility: CLINIC | Age: 82
End: 2017-10-04

## 2017-10-04 DIAGNOSIS — R06.00 DOE (DYSPNEA ON EXERTION): Primary | ICD-10-CM

## 2017-10-06 ENCOUNTER — TELEPHONE (OUTPATIENT)
Dept: INTERNAL MEDICINE CLINIC | Facility: CLINIC | Age: 82
End: 2017-10-06

## 2017-10-06 DIAGNOSIS — R06.00 DOE (DYSPNEA ON EXERTION): Primary | ICD-10-CM

## 2017-10-12 ENCOUNTER — OFFICE VISIT (OUTPATIENT)
Dept: OTOLARYNGOLOGY | Facility: CLINIC | Age: 82
End: 2017-10-12

## 2017-10-12 VITALS
BODY MASS INDEX: 34.22 KG/M2 | DIASTOLIC BLOOD PRESSURE: 70 MMHG | TEMPERATURE: 98 F | HEART RATE: 93 BPM | WEIGHT: 163 LBS | SYSTOLIC BLOOD PRESSURE: 122 MMHG | HEIGHT: 58 IN

## 2017-10-12 DIAGNOSIS — H69.81 DYSFUNCTION OF RIGHT EUSTACHIAN TUBE: Primary | ICD-10-CM

## 2017-10-12 DIAGNOSIS — J38.3 VOCAL CORD DYSFUNCTION: ICD-10-CM

## 2017-10-12 PROCEDURE — 31575 DIAGNOSTIC LARYNGOSCOPY: CPT | Performed by: OTOLARYNGOLOGY

## 2017-10-12 PROCEDURE — G0463 HOSPITAL OUTPT CLINIC VISIT: HCPCS | Performed by: OTOLARYNGOLOGY

## 2017-10-12 PROCEDURE — 99203 OFFICE O/P NEW LOW 30 MIN: CPT | Performed by: OTOLARYNGOLOGY

## 2017-10-12 NOTE — PROGRESS NOTES
Rufino Price is a 81year old female.  Patient presents with:  Throat Problem: patient states she has trouble breathing and hoarseness in voice when talking too much    HPI:   She's been feeling short f breath and somewhat hoarse when she has been wal cardiology. • Abnormal stress echo 1-4-17    +For ischemia. Aaron Bai is Nl. 1-17: Echo shows NL LVEF but LVH.     • Arrhythmia     A FIB   • Cancer [facility])    • Cataract    • Disorder of thyroid     THYROIDECTOMY   • Dizziness 12-16   • High blood pressure Appropriate mood and affect. Lymph Detail Normal Submental. Submandibular. Anterior cervical. Posterior cervical. Supraclavicular.    Eyes Normal Conjunctiva - Right: Normal, Left: Normal. Pupil - Right: Normal, Left: Normal.    Ears Normal Inspection - R plan.      Jimbo Luo.  Nelly Kevin MD  10/12/2017  3:38 PM

## 2017-10-25 ENCOUNTER — OFFICE VISIT (OUTPATIENT)
Dept: PULMONOLOGY | Facility: CLINIC | Age: 82
End: 2017-10-25

## 2017-10-25 VITALS
HEART RATE: 101 BPM | BODY MASS INDEX: 32.93 KG/M2 | SYSTOLIC BLOOD PRESSURE: 129 MMHG | WEIGHT: 161.19 LBS | DIASTOLIC BLOOD PRESSURE: 78 MMHG | HEIGHT: 58.5 IN | OXYGEN SATURATION: 93 % | RESPIRATION RATE: 19 BRPM

## 2017-10-25 DIAGNOSIS — J44.9 CHRONIC OBSTRUCTIVE PULMONARY DISEASE, UNSPECIFIED COPD TYPE (HCC): ICD-10-CM

## 2017-10-25 DIAGNOSIS — R06.00 DYSPNEA, UNSPECIFIED TYPE: Primary | ICD-10-CM

## 2017-10-25 PROCEDURE — G0463 HOSPITAL OUTPT CLINIC VISIT: HCPCS | Performed by: INTERNAL MEDICINE

## 2017-10-25 PROCEDURE — 99214 OFFICE O/P EST MOD 30 MIN: CPT | Performed by: INTERNAL MEDICINE

## 2017-10-25 NOTE — PROGRESS NOTES
[facility]    Report of Consultation    Norma Kelsea Patient Status:  Outpatient    1935 MRN [id number]   Location [address], [address] [place] [address] History  Past Surgical History:  6/13/17: ANTERIOR THR PRECAUTIONS Right      Comment: DR. Jasvir Leslie  No date: APPENDECTOMY  No date: APPENDECTOMY  1-10: CATARACT Bilateral  No date: CHOLECYSTECTOMY  2014: COLONOSCOPY      Comment: Benign polyps.    age 44: S1-S2 regular rate and rhythm I could not appreciate any gallop or murmur  Abdomen exam soft benign bowel sounds positive  Extremity no edema no calf tenderness  Awake alert oriented ×3 with no focal deficit    Results:     Laboratory Data:    Lab Results

## 2017-11-15 ENCOUNTER — HOSPITAL ENCOUNTER (OUTPATIENT)
Dept: CT IMAGING | Facility: HOSPITAL | Age: 82
Discharge: HOME | End: 2017-11-15
Attending: INTERNAL MEDICINE
Payer: MEDICARE

## 2017-11-15 DIAGNOSIS — R06.00 DYSPNEA, UNSPECIFIED TYPE: ICD-10-CM

## 2017-11-15 PROCEDURE — 71250 CT THORAX DX C-: CPT | Performed by: INTERNAL MEDICINE

## 2017-11-28 PROBLEM — E66.9 OBESITY (BMI 30.0-34.9): Status: ACTIVE | Noted: 2017-06-26

## 2017-11-28 PROBLEM — Z85.42 HISTORY OF UTERINE CANCER: Status: ACTIVE | Noted: 2017-11-28

## 2017-11-28 PROBLEM — I65.23 ATHEROSCLEROSIS OF BOTH CAROTID ARTERIES: Status: ACTIVE | Noted: 2017-11-28

## 2017-11-28 PROBLEM — Z90.81 HISTORY OF TOTAL SPLENECTOMY: Status: ACTIVE | Noted: 2017-11-28

## 2017-11-28 PROBLEM — Z96.641 HISTORY OF TOTAL HIP ARTHROPLASTY, RIGHT: Status: ACTIVE | Noted: 2017-11-28

## 2017-11-28 PROBLEM — Z79.01 ANTICOAGULANT LONG-TERM USE: Status: ACTIVE | Noted: 2017-11-28

## 2017-12-01 ENCOUNTER — TELEPHONE (OUTPATIENT)
Dept: INTERNAL MEDICINE CLINIC | Facility: CLINIC | Age: 82
End: 2017-12-01

## 2017-12-01 DIAGNOSIS — R52 PAIN: Primary | ICD-10-CM

## 2017-12-04 ENCOUNTER — TELEPHONE (OUTPATIENT)
Dept: PULMONOLOGY | Facility: CLINIC | Age: 82
End: 2017-12-04

## 2017-12-04 NOTE — TELEPHONE ENCOUNTER
----- Message from Adrian Nevarez MD sent at 12/3/2017  2:10 PM CST -----  Please inform pt that her chest ct showed no significant fibrosis or ILD   Pt to follow up with me with next scheduled apt

## 2017-12-07 ENCOUNTER — APPOINTMENT (OUTPATIENT)
Dept: LAB | Facility: HOSPITAL | Age: 82
End: 2017-12-07
Attending: INTERNAL MEDICINE
Payer: MEDICARE

## 2017-12-07 DIAGNOSIS — E78.2 COMBINED HYPERLIPIDEMIA: ICD-10-CM

## 2017-12-07 PROCEDURE — 80061 LIPID PANEL: CPT

## 2017-12-07 PROCEDURE — 36415 COLL VENOUS BLD VENIPUNCTURE: CPT

## 2017-12-09 ENCOUNTER — OFFICE VISIT (OUTPATIENT)
Dept: SLEEP CENTER | Age: 82
End: 2017-12-09
Attending: INTERNAL MEDICINE
Payer: MEDICARE

## 2017-12-09 DIAGNOSIS — Z76.89 SLEEP CONCERN: Primary | ICD-10-CM

## 2017-12-09 PROCEDURE — 95810 POLYSOM 6/> YRS 4/> PARAM: CPT

## 2017-12-11 ENCOUNTER — OFFICE VISIT (OUTPATIENT)
Dept: INTERNAL MEDICINE CLINIC | Facility: CLINIC | Age: 82
End: 2017-12-11

## 2017-12-11 VITALS
BODY MASS INDEX: 32.49 KG/M2 | HEIGHT: 58.5 IN | WEIGHT: 159 LBS | TEMPERATURE: 98 F | OXYGEN SATURATION: 95 % | SYSTOLIC BLOOD PRESSURE: 124 MMHG | DIASTOLIC BLOOD PRESSURE: 69 MMHG | HEART RATE: 81 BPM

## 2017-12-11 DIAGNOSIS — E03.9 ACQUIRED HYPOTHYROIDISM: ICD-10-CM

## 2017-12-11 DIAGNOSIS — E78.2 COMBINED HYPERLIPIDEMIA: ICD-10-CM

## 2017-12-11 DIAGNOSIS — Z00.00 ENCOUNTER FOR ANNUAL HEALTH EXAMINATION: ICD-10-CM

## 2017-12-11 DIAGNOSIS — I10 ESSENTIAL HYPERTENSION: ICD-10-CM

## 2017-12-11 DIAGNOSIS — Z00.00 ANNUAL PHYSICAL EXAM: Primary | ICD-10-CM

## 2017-12-11 DIAGNOSIS — M85.80 OSTEOPENIA, UNSPECIFIED LOCATION: ICD-10-CM

## 2017-12-11 DIAGNOSIS — Z85.89 HISTORY OF SQUAMOUS CELL CARCINOMA: ICD-10-CM

## 2017-12-11 DIAGNOSIS — Z90.81 H/O SPLENECTOMY: ICD-10-CM

## 2017-12-11 DIAGNOSIS — E55.9 VITAMIN D DEFICIENCY: ICD-10-CM

## 2017-12-11 DIAGNOSIS — R06.00 DOE (DYSPNEA ON EXERTION): ICD-10-CM

## 2017-12-11 DIAGNOSIS — D75.839 THROMBOCYTOSIS: ICD-10-CM

## 2017-12-11 DIAGNOSIS — J02.9 SORE THROAT: ICD-10-CM

## 2017-12-11 DIAGNOSIS — I73.9 PVD (PERIPHERAL VASCULAR DISEASE) (HCC): ICD-10-CM

## 2017-12-11 PROCEDURE — G0439 PPPS, SUBSEQ VISIT: HCPCS | Performed by: INTERNAL MEDICINE

## 2017-12-11 PROCEDURE — 96160 PT-FOCUSED HLTH RISK ASSMT: CPT | Performed by: INTERNAL MEDICINE

## 2017-12-11 RX ORDER — LEVOTHYROXINE SODIUM 88 UG/1
88 TABLET ORAL
Qty: 90 TABLET | Refills: 1 | Status: SHIPPED | OUTPATIENT
Start: 2017-12-11 | End: 2018-01-01 | Stop reason: DRUGHIGH

## 2017-12-11 RX ORDER — SIMVASTATIN 5 MG
5 TABLET ORAL NIGHTLY
Qty: 90 TABLET | Refills: 1 | Status: ON HOLD | OUTPATIENT
Start: 2017-12-11 | End: 2018-01-01

## 2017-12-11 RX ORDER — CHLORHEXIDINE GLUCONATE 0.12 MG/ML
RINSE ORAL DAILY PRN
Refills: 3 | COMMUNITY
Start: 2017-11-27

## 2017-12-11 RX ORDER — LISINOPRIL AND HYDROCHLOROTHIAZIDE 20; 12.5 MG/1; MG/1
1 TABLET ORAL NIGHTLY
Qty: 90 TABLET | Refills: 1 | Status: SHIPPED | OUTPATIENT
Start: 2017-12-11 | End: 2018-02-06 | Stop reason: DRUGHIGH

## 2017-12-11 NOTE — PATIENT INSTRUCTIONS
ASSESSMENT AND OTHER RELEVANT CHRONIC CONDITIONS:   Trip Aguirre is a 81year old female who presents for a Medicare Assessment.      PLAN SUMMARY:   Annual physical exam  -     URINALYSIS, AUTO, W/O SCOPE    Encounter for annual health examination PREVENTATIVE SERVICES  INDICATIONS AND SCHEDULE Internal Lab or Procedure External Lab or Procedure   Diabetes Screening      HbgA1C    At Least  Annually for Diabetics HgbA1C (%)   Date Value   04/25/2016 5.7 (H)    No flowsheet data found.     Fasting B Screen  Covered every 5 years No results found for this or any previous visit. No flowsheet data found. Fecal Occult Blood   Covered Annually No results found for: FOB, OCCULTSTOOL No flowsheet data found.      Barium Enema-   uncomfortable but covered IMM (PNEUMOVAX)    Please get once after your 65th birthday    Hepatitis B for Moderate/High Risk       No orders found for this or any previous visit.  Medium/high risk factors:   End-stage renal disease   Hemophiliacs who received Factor VIII or IX concen

## 2017-12-11 NOTE — PROGRESS NOTES
HPI:    Patient ID: Bill Chaudhari is a 81year old female.     HPI  Bill Chaudhari is a 81year old female who presents for a complete physical exam.   HPI:   Patient presents with:  Wellness Visit: Medicare wellness exam     Full skin exam done 1  07/11/2017 11:57 AM   K 4.1 07/11/2017 11:57 AM    07/11/2017 11:57 AM   CO2 25 07/11/2017 11:57 AM   CREATSERUM 0.80 07/11/2017 11:57 AM   CA 9.1 07/11/2017 11:57 AM   ALB 4.6 06/12/2017 11:41 AM   TP 8.4 06/12/2017 11:41 AM   ALKPHO 43 06/12 Calcium Carb-Cholecalciferol (CALCIUM 600+D) 600-800 MG-UNIT Oral Tab Take 2 tablets by mouth daily. Disp:  Rfl:    Propafenone HCl  MG Oral Capsule SR 12 Hr Take 1 capsule (325 mg total) by mouth Q12H.  Disp: 60 capsule Rfl: 2   IRON OR  Disp:  Rfl: Marital status:               Spouse name:                       Years of education:                 Number of children: 2             Social History Main Topics    Smoking status: Never Smoker Endocrine: Negative for cold intolerance, heat intolerance, polydipsia, polyphagia and polyuria.    Genitourinary: Negative for decreased urine volume, difficulty urinating, dysuria, flank pain, frequency, hematuria, menstrual problem, pelvic pain, urgency, celecoxib 200 MG Oral Cap Take 1 capsule (200 mg total) by mouth every 12 (twelve) hours. Disp: 60 capsule Rfl: 0   multivitamin Oral Tab Take 1 tablet by mouth daily.  Disp:  Rfl:    Omega-3 Fatty Acids (FISH OIL) 1200 MG Oral Cap Take 1,200 mg by mouth da Uterine cancer and S/P chemo.     Family History   Problem Relation Age of Onset   • Bipolar Disorder Daughter    • Cancer Daughter      MM.    • Melanoma Brother    • Other Tatyana Mulling Sister    • Colonic perf [OTHER] Mother    • Bipolar Disorder Nose: Nose normal. No mucosal edema, rhinorrhea, nose lacerations, sinus tenderness, nasal deformity or nasal septal hematoma. No epistaxis. No foreign bodies. Right sinus exhibits no maxillary sinus tenderness and no frontal sinus tenderness.  Left sinus Dorsalis pedis pulses are 2+ on the right side, and 2+ on the left side. Posterior tibial pulses are 2+ on the right side, and 2+ on the left side. Pulmonary/Chest: Effort normal and breath sounds normal. No accessory muscle usage or stridor. Right axillary: No pectoral and no lateral adenopathy present. Left axillary: No pectoral and no lateral adenopathy present. Right: No supraclavicular adenopathy present. Left: No supraclavicular adenopathy present.    Neurological: Ronald Villatoro was screened for Alcohol abuse and had a score of 0 so is at low risk.     Wt Readings from Last 3 Encounters:  12/11/17 : 159 lb (72.1 kg)  10/25/17 : 161 lb 3.2 oz (73.1 kg)  10/12/17 : 163 lb (73.9 kg)     Last Cholesterol Labs:     La Propafenone HCl  MG Oral Capsule SR 12 Hr Take 1 capsule (325 mg total) by mouth Q12H. MEDICAL INFORMATION:   She  has a past medical history of [facility] (1-17); Abnormal stress echo (1-4-17); Arrhythmia; Cancer ([facility] Cataract;  Disorder of t I get confused about where sounds come from:  No I misunderstand some words in a sentence and need to ask people to repeat themselves:  Sometimes   I especially have trouble understanding the speech of women and children:  No I have trouble understanding t BUTLER (dyspnea on exertion) F/U pulmonary. PVD (peripheral vascular disease) (HCC)    Vitamin D deficiency Stable check 6-18. Acquired hypothyroidism Clinically euthyroid. Check blood 6-18. Combined hyperlipidemia Stable. Check blood 6-18.      Os ----------       Cardiovascular Disease Screening     LDL Annually LDL Cholesterol (mg/dL)   Date Value   12/07/2017 79   04/25/2016 77        EKG - w/ Initial Preventative Physical Exam only, or if medically necessary Electrocardiogram date07/13/2017 Clients of institutions for the mentally retarded   Persons who live in the same house as a HepB virus carrier   Homosexual men   Illicit injectable drug abusers     Tetanus Toxoid  Only covered with a cut with metal- TD and TDaP Not covered by Medicare [place] Essential hypertension Good control. Careful with diet and excercise at least 30 minutes 3-4 times a week. Check blood pressures at different times on different days. Can purchase own blood pressure monitor. If not, check at local pharmacy.  Bake foods more

## 2017-12-13 PROBLEM — G47.33 OBSTRUCTIVE SLEEP APNEA SYNDROME: Status: ACTIVE | Noted: 2017-12-13

## 2017-12-13 NOTE — PROCEDURES
[facility]  Accredited by the [facility] of Sleep Medicine (AASM)    PATIENT'S NAME: Celina Gage   ATTENDING PHYSICIAN: Maricarmen De Anda. Tyrone Christopher MD   REFERRING PHYSICIAN: Maricarmen De Anda.  Tyrone Christopher MD   PATIENT ACCOUNT #: [id number] LOCATION: S 14.8 events per hour.   There were 273 periodic limb movements for a periodic limb movement index of 64 events per hour, of which 2.6 per hour were associated with arousal.  Lowest desaturation was to 84%, the average heart rate was 73 beats per minute, and

## 2017-12-14 ENCOUNTER — TELEPHONE (OUTPATIENT)
Dept: INTERNAL MEDICINE CLINIC | Facility: CLINIC | Age: 82
End: 2017-12-14

## 2017-12-27 ENCOUNTER — OFFICE VISIT (OUTPATIENT)
Dept: PULMONOLOGY | Facility: CLINIC | Age: 82
End: 2017-12-27

## 2017-12-27 VITALS
BODY MASS INDEX: 32.32 KG/M2 | WEIGHT: 158.19 LBS | RESPIRATION RATE: 19 BRPM | HEART RATE: 99 BPM | OXYGEN SATURATION: 95 % | DIASTOLIC BLOOD PRESSURE: 83 MMHG | SYSTOLIC BLOOD PRESSURE: 147 MMHG | HEIGHT: 58.5 IN

## 2017-12-27 DIAGNOSIS — G47.33 OSA (OBSTRUCTIVE SLEEP APNEA): ICD-10-CM

## 2017-12-27 DIAGNOSIS — R06.00 DYSPNEA, UNSPECIFIED TYPE: Primary | ICD-10-CM

## 2017-12-27 PROCEDURE — G0463 HOSPITAL OUTPT CLINIC VISIT: HCPCS | Performed by: INTERNAL MEDICINE

## 2017-12-27 PROCEDURE — 99214 OFFICE O/P EST MOD 30 MIN: CPT | Performed by: INTERNAL MEDICINE

## 2017-12-27 NOTE — PROGRESS NOTES
HPI:    Patient ID: Rodríguez Mendes is a 81year old female.     HPI  Dyspnea on exertion only  Stable dyspnea on exertion for few months   No cough no wheezes and inhaler did not help  Mild daytime sleepiness and fatigue  Refreshed in the morning  Noctu Carb-Cholecalciferol (CALCIUM 600+D) 600-800 MG-UNIT Oral Tab Take 2 tablets by mouth daily. Disp:  Rfl:    Propafenone HCl  MG Oral Capsule SR 12 Hr Take 1 capsule (325 mg total) by mouth Q12H.  Disp: 60 capsule Rfl: 2     Allergies:  Aspirin

## 2018-01-01 ENCOUNTER — OFFICE VISIT (OUTPATIENT)
Dept: GASTROENTEROLOGY | Facility: CLINIC | Age: 83
End: 2018-01-01

## 2018-01-01 ENCOUNTER — MED REC SCAN ONLY (OUTPATIENT)
Dept: INTERNAL MEDICINE CLINIC | Facility: CLINIC | Age: 83
End: 2018-01-01

## 2018-01-01 ENCOUNTER — TELEPHONE (OUTPATIENT)
Dept: PHYSICAL THERAPY | Facility: HOSPITAL | Age: 83
End: 2018-01-01

## 2018-01-01 ENCOUNTER — OFFICE VISIT (OUTPATIENT)
Dept: SPEECH THERAPY | Facility: HOSPITAL | Age: 83
End: 2018-01-01
Attending: INTERNAL MEDICINE
Payer: MEDICARE

## 2018-01-01 ENCOUNTER — TELEPHONE (OUTPATIENT)
Dept: NEUROLOGY | Facility: CLINIC | Age: 83
End: 2018-01-01

## 2018-01-01 ENCOUNTER — APPOINTMENT (OUTPATIENT)
Dept: PHYSICAL THERAPY | Facility: HOSPITAL | Age: 83
End: 2018-01-01
Attending: ORTHOPAEDIC SURGERY
Payer: MEDICARE

## 2018-01-01 ENCOUNTER — TELEPHONE (OUTPATIENT)
Dept: INTERNAL MEDICINE CLINIC | Facility: CLINIC | Age: 83
End: 2018-01-01

## 2018-01-01 ENCOUNTER — HOSPITAL ENCOUNTER (INPATIENT)
Facility: HOSPITAL | Age: 83
LOS: 2 days | Discharge: HOME HEALTH | DRG: 470 | End: 2018-01-01
Attending: ORTHOPAEDIC SURGERY | Admitting: ORTHOPAEDIC SURGERY
Payer: MEDICARE

## 2018-01-01 ENCOUNTER — OFFICE VISIT (OUTPATIENT)
Dept: NEUROLOGY | Facility: CLINIC | Age: 83
End: 2018-01-01

## 2018-01-01 ENCOUNTER — TELEPHONE (OUTPATIENT)
Dept: GASTROENTEROLOGY | Facility: CLINIC | Age: 83
End: 2018-01-01

## 2018-01-01 ENCOUNTER — HOSPITAL ENCOUNTER (OUTPATIENT)
Dept: CV DIAGNOSTICS | Facility: HOSPITAL | Age: 83
Discharge: HOME | End: 2018-01-01
Attending: INTERNAL MEDICINE
Payer: MEDICARE

## 2018-01-01 ENCOUNTER — HOSPITAL ENCOUNTER (EMERGENCY)
Facility: HOSPITAL | Age: 83
Discharge: HOME | End: 2018-01-01
Attending: EMERGENCY MEDICINE
Payer: MEDICARE

## 2018-01-01 ENCOUNTER — HOSPITAL ENCOUNTER (OUTPATIENT)
Dept: CT IMAGING | Facility: HOSPITAL | Age: 83
Discharge: HOME | End: 2018-01-01
Attending: INTERNAL MEDICINE
Payer: MEDICARE

## 2018-01-01 ENCOUNTER — HOSPITAL ENCOUNTER (OUTPATIENT)
Facility: HOSPITAL | Age: 83
Setting detail: HOSPITAL OUTPATIENT SURGERY
Discharge: HOME | End: 2018-01-01
Attending: INTERNAL MEDICINE | Admitting: INTERNAL MEDICINE
Payer: MEDICARE

## 2018-01-01 ENCOUNTER — LAB ENCOUNTER (OUTPATIENT)
Dept: LAB | Facility: HOSPITAL | Age: 83
End: 2018-01-01
Attending: INTERNAL MEDICINE
Payer: MEDICARE

## 2018-01-01 ENCOUNTER — HOSPITAL ENCOUNTER (OUTPATIENT)
Dept: MRI IMAGING | Facility: HOSPITAL | Age: 83
Discharge: HOME | End: 2018-01-01
Attending: Other
Payer: MEDICARE

## 2018-01-01 ENCOUNTER — APPOINTMENT (OUTPATIENT)
Dept: CV DIAGNOSTICS | Facility: HOSPITAL | Age: 83
End: 2018-01-01
Attending: Other
Payer: MEDICARE

## 2018-01-01 ENCOUNTER — OFFICE VISIT (OUTPATIENT)
Dept: OTOLARYNGOLOGY | Facility: CLINIC | Age: 83
End: 2018-01-01

## 2018-01-01 ENCOUNTER — LAB ENCOUNTER (OUTPATIENT)
Dept: LAB | Facility: HOSPITAL | Age: 83
End: 2018-01-01
Attending: Other
Payer: MEDICARE

## 2018-01-01 ENCOUNTER — APPOINTMENT (OUTPATIENT)
Dept: GENERAL RADIOLOGY | Facility: HOSPITAL | Age: 83
DRG: 470 | End: 2018-01-01
Attending: ORTHOPAEDIC SURGERY
Payer: MEDICARE

## 2018-01-01 ENCOUNTER — APPOINTMENT (OUTPATIENT)
Dept: GENERAL RADIOLOGY | Facility: HOSPITAL | Age: 83
End: 2018-01-01
Attending: EMERGENCY MEDICINE
Payer: MEDICARE

## 2018-01-01 ENCOUNTER — ANESTHESIA (OUTPATIENT)
Dept: ENDOSCOPY | Facility: HOSPITAL | Age: 83
End: 2018-01-01
Payer: MEDICARE

## 2018-01-01 ENCOUNTER — APPOINTMENT (OUTPATIENT)
Dept: CT IMAGING | Facility: HOSPITAL | Age: 83
End: 2018-01-01
Attending: EMERGENCY MEDICINE
Payer: MEDICARE

## 2018-01-01 ENCOUNTER — HOSPITAL ENCOUNTER (OUTPATIENT)
Dept: INTERVENTIONAL RADIOLOGY/VASCULAR | Facility: HOSPITAL | Age: 83
Discharge: HOME | End: 2018-01-01
Attending: INTERNAL MEDICINE | Admitting: INTERNAL MEDICINE
Payer: MEDICARE

## 2018-01-01 ENCOUNTER — ANESTHESIA EVENT (OUTPATIENT)
Dept: ENDOSCOPY | Facility: HOSPITAL | Age: 83
End: 2018-01-01
Payer: MEDICARE

## 2018-01-01 ENCOUNTER — ANESTHESIA (OUTPATIENT)
Dept: SURGERY | Facility: HOSPITAL | Age: 83
DRG: 470 | End: 2018-01-01
Payer: MEDICARE

## 2018-01-01 ENCOUNTER — APPOINTMENT (OUTPATIENT)
Dept: MRI IMAGING | Facility: HOSPITAL | Age: 83
End: 2018-01-01
Attending: INTERNAL MEDICINE
Payer: MEDICARE

## 2018-01-01 ENCOUNTER — TELEPHONE (OUTPATIENT)
Dept: CARDIOLOGY CLINIC | Facility: HOSPITAL | Age: 83
End: 2018-01-01

## 2018-01-01 ENCOUNTER — OFFICE VISIT (OUTPATIENT)
Dept: INTERNAL MEDICINE CLINIC | Facility: CLINIC | Age: 83
End: 2018-01-01

## 2018-01-01 ENCOUNTER — HOSPITAL ENCOUNTER (OUTPATIENT)
Dept: GENERAL RADIOLOGY | Facility: HOSPITAL | Age: 83
Discharge: HOME | End: 2018-01-01
Attending: INTERNAL MEDICINE
Payer: MEDICARE

## 2018-01-01 ENCOUNTER — HOSPITAL ENCOUNTER (OUTPATIENT)
Dept: NUCLEAR MEDICINE | Facility: HOSPITAL | Age: 83
Discharge: HOME | End: 2018-01-01
Attending: INTERNAL MEDICINE
Payer: MEDICARE

## 2018-01-01 ENCOUNTER — TELEPHONE (OUTPATIENT)
Dept: PULMONOLOGY | Facility: CLINIC | Age: 83
End: 2018-01-01

## 2018-01-01 ENCOUNTER — TELEPHONE (OUTPATIENT)
Dept: OTOLARYNGOLOGY | Facility: CLINIC | Age: 83
End: 2018-01-01

## 2018-01-01 ENCOUNTER — OFFICE VISIT (OUTPATIENT)
Dept: CARDIOLOGY CLINIC | Facility: HOSPITAL | Age: 83
End: 2018-01-01
Attending: INTERNAL MEDICINE
Payer: MEDICARE

## 2018-01-01 ENCOUNTER — ANESTHESIA (OUTPATIENT)
Dept: INTERVENTIONAL RADIOLOGY/VASCULAR | Facility: HOSPITAL | Age: 83
End: 2018-01-01

## 2018-01-01 ENCOUNTER — ANESTHESIA EVENT (OUTPATIENT)
Dept: INTERVENTIONAL RADIOLOGY/VASCULAR | Facility: HOSPITAL | Age: 83
End: 2018-01-01

## 2018-01-01 ENCOUNTER — ANESTHESIA (OUTPATIENT)
Dept: INTERVENTIONAL RADIOLOGY/VASCULAR | Facility: HOSPITAL | Age: 83
End: 2018-01-01
Payer: MEDICARE

## 2018-01-01 ENCOUNTER — OFFICE VISIT (OUTPATIENT)
Dept: PULMONOLOGY | Facility: CLINIC | Age: 83
End: 2018-01-01

## 2018-01-01 ENCOUNTER — OFFICE VISIT (OUTPATIENT)
Dept: HEMATOLOGY/ONCOLOGY | Facility: HOSPITAL | Age: 83
End: 2018-01-01
Attending: INTERNAL MEDICINE
Payer: MEDICARE

## 2018-01-01 ENCOUNTER — HOSPITAL ENCOUNTER (OUTPATIENT)
Facility: HOSPITAL | Age: 83
Setting detail: OBSERVATION
Discharge: HOME | End: 2018-01-01
Attending: EMERGENCY MEDICINE | Admitting: INTERNAL MEDICINE
Payer: MEDICARE

## 2018-01-01 ENCOUNTER — TELEPHONE (OUTPATIENT)
Dept: FAMILY MEDICINE CLINIC | Facility: CLINIC | Age: 83
End: 2018-01-01

## 2018-01-01 ENCOUNTER — HOSPITAL ENCOUNTER (OUTPATIENT)
Dept: MRI IMAGING | Facility: HOSPITAL | Age: 83
Discharge: HOME | End: 2018-01-01
Attending: INTERNAL MEDICINE
Payer: MEDICARE

## 2018-01-01 ENCOUNTER — SURGERY (OUTPATIENT)
Age: 83
End: 2018-01-01

## 2018-01-01 ENCOUNTER — APPOINTMENT (OUTPATIENT)
Dept: ULTRASOUND IMAGING | Facility: HOSPITAL | Age: 83
End: 2018-01-01
Attending: Other
Payer: MEDICARE

## 2018-01-01 ENCOUNTER — ANESTHESIA EVENT (OUTPATIENT)
Dept: SURGERY | Facility: HOSPITAL | Age: 83
DRG: 470 | End: 2018-01-01
Payer: MEDICARE

## 2018-01-01 ENCOUNTER — OFFICE VISIT (OUTPATIENT)
Dept: CARDIOLOGY CLINIC | Facility: HOSPITAL | Age: 83
End: 2018-01-01
Attending: NURSE PRACTITIONER
Payer: MEDICARE

## 2018-01-01 ENCOUNTER — HOSPITAL ENCOUNTER (OUTPATIENT)
Dept: MRI IMAGING | Facility: HOSPITAL | Age: 83
Discharge: HOME | End: 2018-01-01
Attending: ORTHOPAEDIC SURGERY
Payer: MEDICARE

## 2018-01-01 ENCOUNTER — PROCEDURE VISIT (OUTPATIENT)
Dept: NEUROLOGY | Facility: CLINIC | Age: 83
End: 2018-01-01

## 2018-01-01 VITALS
TEMPERATURE: 99 F | WEIGHT: 145 LBS | BODY MASS INDEX: 30.44 KG/M2 | HEIGHT: 58 IN | RESPIRATION RATE: 18 BRPM | DIASTOLIC BLOOD PRESSURE: 92 MMHG | SYSTOLIC BLOOD PRESSURE: 152 MMHG | HEART RATE: 113 BPM

## 2018-01-01 VITALS
HEART RATE: 74 BPM | TEMPERATURE: 97 F | OXYGEN SATURATION: 99 % | SYSTOLIC BLOOD PRESSURE: 149 MMHG | DIASTOLIC BLOOD PRESSURE: 69 MMHG | BODY MASS INDEX: 27 KG/M2 | WEIGHT: 127 LBS

## 2018-01-01 VITALS
HEART RATE: 105 BPM | DIASTOLIC BLOOD PRESSURE: 73 MMHG | HEIGHT: 58 IN | BODY MASS INDEX: 31.45 KG/M2 | SYSTOLIC BLOOD PRESSURE: 130 MMHG | OXYGEN SATURATION: 97 % | RESPIRATION RATE: 19 BRPM | WEIGHT: 149.81 LBS

## 2018-01-01 VITALS
BODY MASS INDEX: 28 KG/M2 | TEMPERATURE: 98 F | HEIGHT: 58 IN | DIASTOLIC BLOOD PRESSURE: 79 MMHG | WEIGHT: 133.38 LBS | OXYGEN SATURATION: 96 % | HEART RATE: 111 BPM | SYSTOLIC BLOOD PRESSURE: 123 MMHG

## 2018-01-01 VITALS
TEMPERATURE: 97 F | SYSTOLIC BLOOD PRESSURE: 152 MMHG | BODY MASS INDEX: 29.81 KG/M2 | RESPIRATION RATE: 20 BRPM | OXYGEN SATURATION: 91 % | HEART RATE: 102 BPM | WEIGHT: 142 LBS | HEIGHT: 58 IN | DIASTOLIC BLOOD PRESSURE: 75 MMHG

## 2018-01-01 VITALS
SYSTOLIC BLOOD PRESSURE: 106 MMHG | OXYGEN SATURATION: 94 % | WEIGHT: 121 LBS | DIASTOLIC BLOOD PRESSURE: 69 MMHG | TEMPERATURE: 97 F | HEART RATE: 82 BPM | BODY MASS INDEX: 25 KG/M2

## 2018-01-01 VITALS
WEIGHT: 120 LBS | RESPIRATION RATE: 22 BRPM | SYSTOLIC BLOOD PRESSURE: 147 MMHG | DIASTOLIC BLOOD PRESSURE: 73 MMHG | HEART RATE: 90 BPM | OXYGEN SATURATION: 92 % | BODY MASS INDEX: 25.19 KG/M2 | HEIGHT: 58 IN

## 2018-01-01 VITALS
RESPIRATION RATE: 18 BRPM | TEMPERATURE: 98 F | DIASTOLIC BLOOD PRESSURE: 70 MMHG | BODY MASS INDEX: 28.34 KG/M2 | SYSTOLIC BLOOD PRESSURE: 138 MMHG | HEART RATE: 111 BPM | WEIGHT: 135 LBS | HEIGHT: 58 IN

## 2018-01-01 VITALS
SYSTOLIC BLOOD PRESSURE: 146 MMHG | RESPIRATION RATE: 20 BRPM | BODY MASS INDEX: 27 KG/M2 | DIASTOLIC BLOOD PRESSURE: 70 MMHG | HEART RATE: 83 BPM | OXYGEN SATURATION: 97 % | WEIGHT: 130 LBS

## 2018-01-01 VITALS
HEIGHT: 58 IN | OXYGEN SATURATION: 93 % | RESPIRATION RATE: 20 BRPM | HEART RATE: 112 BPM | BODY MASS INDEX: 30.02 KG/M2 | SYSTOLIC BLOOD PRESSURE: 92 MMHG | DIASTOLIC BLOOD PRESSURE: 58 MMHG | WEIGHT: 143 LBS

## 2018-01-01 VITALS
WEIGHT: 146.38 LBS | HEIGHT: 58 IN | BODY MASS INDEX: 30.73 KG/M2 | SYSTOLIC BLOOD PRESSURE: 113 MMHG | OXYGEN SATURATION: 95 % | HEART RATE: 114 BPM | TEMPERATURE: 98 F | DIASTOLIC BLOOD PRESSURE: 71 MMHG

## 2018-01-01 VITALS
HEART RATE: 106 BPM | BODY MASS INDEX: 29.81 KG/M2 | SYSTOLIC BLOOD PRESSURE: 167 MMHG | OXYGEN SATURATION: 95 % | HEIGHT: 58 IN | RESPIRATION RATE: 20 BRPM | WEIGHT: 142 LBS | TEMPERATURE: 98 F | DIASTOLIC BLOOD PRESSURE: 90 MMHG

## 2018-01-01 VITALS
OXYGEN SATURATION: 97 % | DIASTOLIC BLOOD PRESSURE: 47 MMHG | WEIGHT: 141.13 LBS | TEMPERATURE: 99 F | SYSTOLIC BLOOD PRESSURE: 112 MMHG | RESPIRATION RATE: 18 BRPM | BODY MASS INDEX: 29.63 KG/M2 | HEART RATE: 95 BPM | HEIGHT: 58 IN

## 2018-01-01 VITALS
WEIGHT: 142.5 LBS | OXYGEN SATURATION: 96 % | DIASTOLIC BLOOD PRESSURE: 56 MMHG | BODY MASS INDEX: 30 KG/M2 | SYSTOLIC BLOOD PRESSURE: 119 MMHG | HEART RATE: 112 BPM

## 2018-01-01 VITALS
SYSTOLIC BLOOD PRESSURE: 110 MMHG | BODY MASS INDEX: 28.13 KG/M2 | HEART RATE: 100 BPM | DIASTOLIC BLOOD PRESSURE: 66 MMHG | HEIGHT: 58 IN | WEIGHT: 134 LBS

## 2018-01-01 VITALS
BODY MASS INDEX: 28.76 KG/M2 | DIASTOLIC BLOOD PRESSURE: 63 MMHG | RESPIRATION RATE: 21 BRPM | OXYGEN SATURATION: 95 % | HEIGHT: 58 IN | TEMPERATURE: 98 F | WEIGHT: 137 LBS | HEART RATE: 98 BPM | SYSTOLIC BLOOD PRESSURE: 120 MMHG

## 2018-01-01 VITALS — HEIGHT: 58 IN | WEIGHT: 130 LBS | BODY MASS INDEX: 27.29 KG/M2

## 2018-01-01 VITALS
SYSTOLIC BLOOD PRESSURE: 104 MMHG | DIASTOLIC BLOOD PRESSURE: 58 MMHG | HEART RATE: 90 BPM | BODY MASS INDEX: 27.08 KG/M2 | HEIGHT: 58 IN | WEIGHT: 129 LBS

## 2018-01-01 VITALS
HEART RATE: 105 BPM | SYSTOLIC BLOOD PRESSURE: 129 MMHG | WEIGHT: 124 LBS | BODY MASS INDEX: 26.03 KG/M2 | DIASTOLIC BLOOD PRESSURE: 73 MMHG | HEIGHT: 58 IN

## 2018-01-01 VITALS
WEIGHT: 129 LBS | DIASTOLIC BLOOD PRESSURE: 60 MMHG | HEIGHT: 58 IN | SYSTOLIC BLOOD PRESSURE: 120 MMHG | TEMPERATURE: 98 F | BODY MASS INDEX: 27.08 KG/M2

## 2018-01-01 VITALS
HEART RATE: 106 BPM | TEMPERATURE: 99 F | OXYGEN SATURATION: 94 % | BODY MASS INDEX: 29.47 KG/M2 | SYSTOLIC BLOOD PRESSURE: 136 MMHG | DIASTOLIC BLOOD PRESSURE: 85 MMHG | WEIGHT: 140.38 LBS | HEIGHT: 58 IN

## 2018-01-01 VITALS
TEMPERATURE: 98 F | DIASTOLIC BLOOD PRESSURE: 75 MMHG | HEART RATE: 90 BPM | WEIGHT: 127 LBS | BODY MASS INDEX: 26.66 KG/M2 | HEIGHT: 58 IN | OXYGEN SATURATION: 97 % | SYSTOLIC BLOOD PRESSURE: 145 MMHG

## 2018-01-01 VITALS
SYSTOLIC BLOOD PRESSURE: 102 MMHG | WEIGHT: 120 LBS | HEART RATE: 90 BPM | DIASTOLIC BLOOD PRESSURE: 60 MMHG | BODY MASS INDEX: 25.19 KG/M2 | HEIGHT: 58 IN

## 2018-01-01 VITALS
SYSTOLIC BLOOD PRESSURE: 128 MMHG | BODY MASS INDEX: 30 KG/M2 | DIASTOLIC BLOOD PRESSURE: 64 MMHG | HEART RATE: 104 BPM | WEIGHT: 144.13 LBS | OXYGEN SATURATION: 94 %

## 2018-01-01 VITALS — DIASTOLIC BLOOD PRESSURE: 67 MMHG | HEART RATE: 107 BPM | TEMPERATURE: 97 F | SYSTOLIC BLOOD PRESSURE: 106 MMHG

## 2018-01-01 DIAGNOSIS — I73.9 PVD (PERIPHERAL VASCULAR DISEASE) (HCC): ICD-10-CM

## 2018-01-01 DIAGNOSIS — R63.4 WEIGHT LOSS: ICD-10-CM

## 2018-01-01 DIAGNOSIS — Z01.810 PRE-OPERATIVE CARDIOVASCULAR EXAMINATION: ICD-10-CM

## 2018-01-01 DIAGNOSIS — R79.1 PROLONGED PT (PROTHROMBIN TIME): ICD-10-CM

## 2018-01-01 DIAGNOSIS — E03.9 ACQUIRED HYPOTHYROIDISM: ICD-10-CM

## 2018-01-01 DIAGNOSIS — Z01.818 PREOPERATIVE TESTING: Primary | ICD-10-CM

## 2018-01-01 DIAGNOSIS — R13.10 DYSPHAGIA, UNSPECIFIED TYPE: ICD-10-CM

## 2018-01-01 DIAGNOSIS — R79.1 ABNORMAL COAGULATION PROFILE: Primary | ICD-10-CM

## 2018-01-01 DIAGNOSIS — J44.9 CHRONIC OBSTRUCTIVE PULMONARY DISEASE, UNSPECIFIED COPD TYPE (HCC): ICD-10-CM

## 2018-01-01 DIAGNOSIS — Z86.79 HISTORY OF ATRIAL FIBRILLATION: ICD-10-CM

## 2018-01-01 DIAGNOSIS — R13.10 ABNORMAL SWALLOWING: ICD-10-CM

## 2018-01-01 DIAGNOSIS — R47.1 DYSARTHRIA: ICD-10-CM

## 2018-01-01 DIAGNOSIS — R06.00 DOE (DYSPNEA ON EXERTION): ICD-10-CM

## 2018-01-01 DIAGNOSIS — R06.00 DYSPNEA, UNSPECIFIED TYPE: ICD-10-CM

## 2018-01-01 DIAGNOSIS — Z99.89 OSA ON CPAP: Primary | ICD-10-CM

## 2018-01-01 DIAGNOSIS — I48.0 PAROXYSMAL ATRIAL FIBRILLATION (HCC): Primary | ICD-10-CM

## 2018-01-01 DIAGNOSIS — I48.0 PAROXYSMAL ATRIAL FIBRILLATION (HCC): ICD-10-CM

## 2018-01-01 DIAGNOSIS — R13.19 OTHER DYSPHAGIA: ICD-10-CM

## 2018-01-01 DIAGNOSIS — R20.0 NUMBNESS AND TINGLING IN LEFT ARM: Primary | ICD-10-CM

## 2018-01-01 DIAGNOSIS — E78.2 COMBINED HYPERLIPIDEMIA: ICD-10-CM

## 2018-01-01 DIAGNOSIS — M21.70 LEG LENGTH DISCREPANCY: ICD-10-CM

## 2018-01-01 DIAGNOSIS — R03.0 ELEVATED BLOOD PRESSURE READING: ICD-10-CM

## 2018-01-01 DIAGNOSIS — E78.2 COMBINED HYPERLIPIDEMIA: Primary | ICD-10-CM

## 2018-01-01 DIAGNOSIS — J38.3 VOCAL CORD DYSFUNCTION: Primary | ICD-10-CM

## 2018-01-01 DIAGNOSIS — I48.91 ATRIAL FIBRILLATION (HCC): ICD-10-CM

## 2018-01-01 DIAGNOSIS — G89.29 CHRONIC PAIN OF LEFT KNEE: Primary | ICD-10-CM

## 2018-01-01 DIAGNOSIS — R49.0 HOARSE VOICE QUALITY: ICD-10-CM

## 2018-01-01 DIAGNOSIS — R25.3 FASCICULATION: ICD-10-CM

## 2018-01-01 DIAGNOSIS — I10 ESSENTIAL HYPERTENSION: Primary | ICD-10-CM

## 2018-01-01 DIAGNOSIS — I95.9 HYPOTENSIVE EPISODE: ICD-10-CM

## 2018-01-01 DIAGNOSIS — D75.839 THROMBOCYTOSIS: ICD-10-CM

## 2018-01-01 DIAGNOSIS — I48.4 ATYPICAL ATRIAL FLUTTER (HCC): ICD-10-CM

## 2018-01-01 DIAGNOSIS — Z90.81 HISTORY OF SPLENECTOMY: ICD-10-CM

## 2018-01-01 DIAGNOSIS — M25.562 CHRONIC PAIN OF LEFT KNEE: Primary | ICD-10-CM

## 2018-01-01 DIAGNOSIS — E11.9 DIABETES MELLITUS WITHOUT COMPLICATION (HCC): ICD-10-CM

## 2018-01-01 DIAGNOSIS — R20.2 NUMBNESS AND TINGLING IN LEFT ARM: Primary | ICD-10-CM

## 2018-01-01 DIAGNOSIS — I48.0 PAF (PAROXYSMAL ATRIAL FIBRILLATION) (HCC): ICD-10-CM

## 2018-01-01 DIAGNOSIS — R63.4 WEIGHT LOSS: Primary | ICD-10-CM

## 2018-01-01 DIAGNOSIS — I48.0 PAF (PAROXYSMAL ATRIAL FIBRILLATION) (HCC): Primary | ICD-10-CM

## 2018-01-01 DIAGNOSIS — Z79.01 LONG TERM CURRENT USE OF ANTICOAGULANT: ICD-10-CM

## 2018-01-01 DIAGNOSIS — R47.1 DYSARTHRIA: Primary | ICD-10-CM

## 2018-01-01 DIAGNOSIS — I50.31 ACUTE DIASTOLIC CONGESTIVE HEART FAILURE (HCC): Primary | ICD-10-CM

## 2018-01-01 DIAGNOSIS — G45.9 TRANSIENT CEREBRAL ISCHEMIA, UNSPECIFIED TYPE: ICD-10-CM

## 2018-01-01 DIAGNOSIS — R13.10 DYSPHAGIA, UNSPECIFIED TYPE: Primary | ICD-10-CM

## 2018-01-01 DIAGNOSIS — M17.12 OSTEOARTHRITIS OF LEFT KNEE: ICD-10-CM

## 2018-01-01 DIAGNOSIS — R13.12 OROPHARYNGEAL DYSPHAGIA: ICD-10-CM

## 2018-01-01 DIAGNOSIS — G45.9 TRANSIENT CEREBRAL ISCHEMIA, UNSPECIFIED TYPE: Primary | ICD-10-CM

## 2018-01-01 DIAGNOSIS — R13.19 OTHER DYSPHAGIA: Primary | ICD-10-CM

## 2018-01-01 DIAGNOSIS — Z79.4 TYPE 2 DIABETES MELLITUS WITH MICROALBUMINURIA, WITH LONG-TERM CURRENT USE OF INSULIN (HCC): ICD-10-CM

## 2018-01-01 DIAGNOSIS — Z79.01 ANTICOAGULANT LONG-TERM USE: ICD-10-CM

## 2018-01-01 DIAGNOSIS — D75.839 THROMBOCYTOSIS: Primary | ICD-10-CM

## 2018-01-01 DIAGNOSIS — E53.8 B12 DEFICIENCY: ICD-10-CM

## 2018-01-01 DIAGNOSIS — R79.1 ABNORMALLY PROLONGED CLOTTING TIME: Primary | ICD-10-CM

## 2018-01-01 DIAGNOSIS — E55.9 VITAMIN D DEFICIENCY: ICD-10-CM

## 2018-01-01 DIAGNOSIS — E11.9 DIABETES MELLITUS WITHOUT COMPLICATION (HCC): Primary | ICD-10-CM

## 2018-01-01 DIAGNOSIS — I50.9 HEART FAILURE, UNSPECIFIED (HCC): ICD-10-CM

## 2018-01-01 DIAGNOSIS — Z96.652 S/P TOTAL KNEE ARTHROPLASTY, LEFT: ICD-10-CM

## 2018-01-01 DIAGNOSIS — M25.559 ARTHRALGIA OF HIP, UNSPECIFIED LATERALITY: ICD-10-CM

## 2018-01-01 DIAGNOSIS — K44.9 HIATAL HERNIA: Primary | ICD-10-CM

## 2018-01-01 DIAGNOSIS — G47.33 OSA ON CPAP: Primary | ICD-10-CM

## 2018-01-01 DIAGNOSIS — R06.02 SOB (SHORTNESS OF BREATH): ICD-10-CM

## 2018-01-01 DIAGNOSIS — G47.33 OBSTRUCTIVE SLEEP APNEA SYNDROME: ICD-10-CM

## 2018-01-01 DIAGNOSIS — Z90.81 H/O SPLENECTOMY: ICD-10-CM

## 2018-01-01 DIAGNOSIS — R13.10 ABNORMAL SWALLOWING: Primary | ICD-10-CM

## 2018-01-01 DIAGNOSIS — R52 PAIN: Primary | ICD-10-CM

## 2018-01-01 DIAGNOSIS — Z00.00 ADULT GENERAL MEDICAL EXAMINATION: ICD-10-CM

## 2018-01-01 DIAGNOSIS — I10 ESSENTIAL HYPERTENSION: ICD-10-CM

## 2018-01-01 DIAGNOSIS — M47.816 ARTHRITIS, LUMBAR SPINE: ICD-10-CM

## 2018-01-01 DIAGNOSIS — R82.90 ABNORMAL URINE: ICD-10-CM

## 2018-01-01 DIAGNOSIS — I95.9 TRANSIENT HYPOTENSION: ICD-10-CM

## 2018-01-01 DIAGNOSIS — K22.2 SCHATZKI'S RING: ICD-10-CM

## 2018-01-01 DIAGNOSIS — E55.9 VITAMIN D DEFICIENCY: Primary | ICD-10-CM

## 2018-01-01 DIAGNOSIS — Z00.00 ENCOUNTER FOR ANNUAL HEALTH EXAMINATION: ICD-10-CM

## 2018-01-01 DIAGNOSIS — Z01.818 PREOPERATIVE TESTING: ICD-10-CM

## 2018-01-01 DIAGNOSIS — E87.79 OTHER HYPERVOLEMIA: ICD-10-CM

## 2018-01-01 DIAGNOSIS — G47.33 OSA (OBSTRUCTIVE SLEEP APNEA): Primary | ICD-10-CM

## 2018-01-01 DIAGNOSIS — R25.3 FASCICULATION: Primary | ICD-10-CM

## 2018-01-01 DIAGNOSIS — E86.0 DEHYDRATION: Primary | ICD-10-CM

## 2018-01-01 DIAGNOSIS — E11.29 TYPE 2 DIABETES MELLITUS WITH MICROALBUMINURIA, WITH LONG-TERM CURRENT USE OF INSULIN (HCC): ICD-10-CM

## 2018-01-01 DIAGNOSIS — R80.9 TYPE 2 DIABETES MELLITUS WITH MICROALBUMINURIA, WITH LONG-TERM CURRENT USE OF INSULIN (HCC): ICD-10-CM

## 2018-01-01 LAB
ACETYLCHOLINE BINDING AB: 0 NMOL/L
ACETYLCHOLINE BLOCKING AB: 14 %
ALBUMIN SERPL BCP-MCNC: 3.8 G/DL (ref 3.5–4.8)
ALBUMIN SERPL BCP-MCNC: 4 G/DL (ref 3.5–4.8)
ALBUMIN SERPL BCP-MCNC: 4.1 G/DL (ref 3.5–4.8)
ALBUMIN/GLOB SERPL: 1.1 {RATIO} (ref 1–2)
ALBUMIN/GLOB SERPL: 1.2 {RATIO} (ref 1–2)
ALBUMIN/GLOB SERPL: 1.3 {RATIO} (ref 1–2)
ALP SERPL-CCNC: 40 U/L (ref 32–100)
ALP SERPL-CCNC: 41 U/L (ref 32–100)
ALP SERPL-CCNC: 43 U/L (ref 32–100)
ALT SERPL-CCNC: 14 U/L (ref 14–54)
ALT SERPL-CCNC: 15 U/L (ref 14–54)
ALT SERPL-CCNC: 16 U/L (ref 14–54)
ANA NUCLEOLAR TITR SER IF: 80 {TITER}
ANION GAP SERPL CALC-SCNC: 10 MMOL/L (ref 0–18)
ANION GAP SERPL CALC-SCNC: 7 MMOL/L (ref 0–18)
ANION GAP SERPL CALC-SCNC: 8 MMOL/L (ref 0–18)
ANION GAP SERPL CALC-SCNC: 9 MMOL/L (ref 0–18)
ANION GAP SERPL CALC-SCNC: 9 MMOL/L (ref 0–18)
APPEARANCE: CLEAR
AST SERPL-CCNC: 26 U/L (ref 15–41)
BASOPHILS # BLD: 0.1 K/UL (ref 0–0.2)
BASOPHILS NFR BLD: 1 %
BILIRUB SERPL-MCNC: 0.5 MG/DL (ref 0.3–1.2)
BILIRUB UR QL: NEGATIVE
BILIRUBIN: NEGATIVE
BUN SERPL-MCNC: 12 MG/DL (ref 8–20)
BUN SERPL-MCNC: 13 MG/DL (ref 8–20)
BUN SERPL-MCNC: 13 MG/DL (ref 8–20)
BUN SERPL-MCNC: 14 MG/DL (ref 8–20)
BUN SERPL-MCNC: 17 MG/DL (ref 8–20)
BUN/CREAT SERPL: 14.5 (ref 10–20)
BUN/CREAT SERPL: 16.5 (ref 10–20)
BUN/CREAT SERPL: 17.6 (ref 10–20)
BUN/CREAT SERPL: 17.7 (ref 10–20)
BUN/CREAT SERPL: 23 (ref 10–20)
CALCIUM SERPL-MCNC: 9 MG/DL (ref 8.5–10.5)
CALCIUM SERPL-MCNC: 9.2 MG/DL (ref 8.5–10.5)
CALCIUM SERPL-MCNC: 9.4 MG/DL (ref 8.5–10.5)
CALCIUM SERPL-MCNC: 9.4 MG/DL (ref 8.5–10.5)
CALCIUM SERPL-MCNC: 9.6 MG/DL (ref 8.5–10.5)
CHLORIDE SERPL-SCNC: 100 MMOL/L (ref 95–110)
CHLORIDE SERPL-SCNC: 101 MMOL/L (ref 95–110)
CHLORIDE SERPL-SCNC: 104 MMOL/L (ref 95–110)
CHLORIDE SERPL-SCNC: 97 MMOL/L (ref 95–110)
CHLORIDE SERPL-SCNC: 98 MMOL/L (ref 95–110)
CHOLEST SERPL-MCNC: 166 MG/DL (ref 110–200)
CHOLEST SERPL-MCNC: 194 MG/DL (ref 110–200)
CLARITY UR: CLEAR
CO2 SERPL-SCNC: 29 MMOL/L (ref 22–32)
CO2 SERPL-SCNC: 30 MMOL/L (ref 22–32)
COLOR UR: YELLOW
CREAT SERPL-MCNC: 0.74 MG/DL (ref 0.5–1.5)
CREAT SERPL-MCNC: 0.74 MG/DL (ref 0.5–1.5)
CREAT SERPL-MCNC: 0.79 MG/DL (ref 0.5–1.5)
CREAT SERPL-MCNC: 0.79 MG/DL (ref 0.5–1.5)
CREAT SERPL-MCNC: 0.83 MG/DL (ref 0.5–1.5)
DSDNA AB TITR SER: <10 {TITER}
ENA SM IGG SER QL: NEGATIVE
ENA SM+RNP AB SER QL: NEGATIVE
ENA SS-A AB SER QL IA: NEGATIVE
ENA SS-B AB SER QL IA: NEGATIVE
EOSINOPHIL # BLD: 0.1 K/UL (ref 0–0.7)
EOSINOPHIL # BLD: 0.2 K/UL (ref 0–0.7)
EOSINOPHIL # BLD: 0.2 K/UL (ref 0–0.7)
EOSINOPHIL # BLD: 0.3 K/UL (ref 0–0.7)
EOSINOPHIL NFR BLD: 2 %
EOSINOPHIL NFR BLD: 3 %
EOSINOPHIL NFR BLD: 3 %
EOSINOPHIL NFR BLD: 4 %
ERYTHROCYTE [DISTWIDTH] IN BLOOD BY AUTOMATED COUNT: 13.3 % (ref 11–15)
ERYTHROCYTE [DISTWIDTH] IN BLOOD BY AUTOMATED COUNT: 13.6 % (ref 11–15)
ERYTHROCYTE [DISTWIDTH] IN BLOOD BY AUTOMATED COUNT: 13.7 % (ref 11–15)
ERYTHROCYTE [DISTWIDTH] IN BLOOD BY AUTOMATED COUNT: 14.5 % (ref 11–15)
GLOBULIN PLAS-MCNC: 3.2 G/DL (ref 2.5–3.7)
GLOBULIN PLAS-MCNC: 3.3 G/DL (ref 2.5–3.7)
GLOBULIN PLAS-MCNC: 3.6 G/DL (ref 2.5–3.7)
GLUCOSE (URINE DIPSTICK): NEGATIVE MG/DL
GLUCOSE SERPL-MCNC: 105 MG/DL (ref 70–99)
GLUCOSE SERPL-MCNC: 107 MG/DL (ref 70–99)
GLUCOSE SERPL-MCNC: 112 MG/DL (ref 70–99)
GLUCOSE SERPL-MCNC: 123 MG/DL (ref 70–99)
GLUCOSE SERPL-MCNC: 92 MG/DL (ref 70–99)
GLUCOSE UR-MCNC: NEGATIVE MG/DL
HBA1C MFR BLD: 5.9 % (ref 4–6)
HCT VFR BLD AUTO: 36.6 % (ref 35–48)
HCT VFR BLD AUTO: 40.4 % (ref 35–48)
HCT VFR BLD AUTO: 42.9 % (ref 35–48)
HCT VFR BLD AUTO: 44.2 % (ref 35–48)
HCYS SERPL-SCNC: 12.9 UMOL/L
HDLC SERPL-MCNC: 67 MG/DL
HDLC SERPL-MCNC: 79 MG/DL
HGB BLD-MCNC: 12 G/DL (ref 12–16)
HGB BLD-MCNC: 13.3 G/DL (ref 12–16)
HGB BLD-MCNC: 13.9 G/DL (ref 12–16)
HGB BLD-MCNC: 14.6 G/DL (ref 12–16)
KETONES UR-MCNC: NEGATIVE MG/DL
LDLC SERPL CALC-MCNC: 102 MG/DL (ref 0–99)
LDLC SERPL CALC-MCNC: 61 MG/DL (ref 0–99)
LYMPHOCYTES # BLD: 1.9 K/UL (ref 1–4)
LYMPHOCYTES # BLD: 2 K/UL (ref 1–4)
LYMPHOCYTES # BLD: 2.1 K/UL (ref 1–4)
LYMPHOCYTES # BLD: 2.6 K/UL (ref 1–4)
LYMPHOCYTES NFR BLD: 30 %
LYMPHOCYTES NFR BLD: 31 %
LYMPHOCYTES NFR BLD: 32 %
LYMPHOCYTES NFR BLD: 40 %
MAGNESIUM SERPL-MCNC: 1.8 MG/DL (ref 1.8–2.5)
MCH RBC QN AUTO: 27.9 PG (ref 27–32)
MCH RBC QN AUTO: 28.9 PG (ref 27–32)
MCH RBC QN AUTO: 29.5 PG (ref 27–32)
MCH RBC QN AUTO: 30.6 PG (ref 27–32)
MCHC RBC AUTO-ENTMCNC: 32.4 G/DL (ref 32–37)
MCHC RBC AUTO-ENTMCNC: 32.8 G/DL (ref 32–37)
MCHC RBC AUTO-ENTMCNC: 32.9 G/DL (ref 32–37)
MCHC RBC AUTO-ENTMCNC: 33 G/DL (ref 32–37)
MCV RBC AUTO: 86.2 FL (ref 80–100)
MCV RBC AUTO: 87.5 FL (ref 80–100)
MCV RBC AUTO: 89.6 FL (ref 80–100)
MCV RBC AUTO: 93.4 FL (ref 80–100)
MONOCYTES # BLD: 0.6 K/UL (ref 0–1)
MONOCYTES # BLD: 0.6 K/UL (ref 0–1)
MONOCYTES # BLD: 0.7 K/UL (ref 0–1)
MONOCYTES # BLD: 0.7 K/UL (ref 0–1)
MONOCYTES NFR BLD: 10 %
MONOCYTES NFR BLD: 11 %
MULTISTIX LOT#: NORMAL NUMERIC
NEUTROPHILS # BLD AUTO: 3 K/UL (ref 1.8–7.7)
NEUTROPHILS # BLD AUTO: 3.3 K/UL (ref 1.8–7.7)
NEUTROPHILS # BLD AUTO: 3.4 K/UL (ref 1.8–7.7)
NEUTROPHILS # BLD AUTO: 4.1 K/UL (ref 1.8–7.7)
NEUTROPHILS NFR BLD: 46 %
NEUTROPHILS NFR BLD: 54 %
NEUTROPHILS NFR BLD: 54 %
NEUTROPHILS NFR BLD: 57 %
NITRITE UR QL STRIP.AUTO: POSITIVE
NITRITE, URINE: POSITIVE
NONHDLC SERPL-MCNC: 127 MG/DL
NONHDLC SERPL-MCNC: 87 MG/DL
NUCLEAR IGG TITR SER IF: POSITIVE {TITER}
OSMOLALITY UR CALC.SUM OF ELEC: 282 MOSM/KG (ref 275–295)
OSMOLALITY UR CALC.SUM OF ELEC: 285 MOSM/KG (ref 275–295)
OSMOLALITY UR CALC.SUM OF ELEC: 285 MOSM/KG (ref 275–295)
OSMOLALITY UR CALC.SUM OF ELEC: 290 MOSM/KG (ref 275–295)
OSMOLALITY UR CALC.SUM OF ELEC: 291 MOSM/KG (ref 275–295)
PATIENT FASTING: NO
PATIENT FASTING: NO
PATIENT FASTING: YES
PH UR: 5 [PH] (ref 5–8)
PH, URINE: 5.5 (ref 4.5–8)
PLATELET # BLD AUTO: 376 K/UL (ref 140–400)
PLATELET # BLD AUTO: 400 K/UL (ref 140–400)
PLATELET # BLD AUTO: 432 K/UL (ref 140–400)
PLATELET # BLD AUTO: 462 K/UL (ref 140–400)
PMV BLD AUTO: 7.2 FL (ref 7.4–10.3)
PMV BLD AUTO: 7.4 FL (ref 7.4–10.3)
PMV BLD AUTO: 7.7 FL (ref 7.4–10.3)
PMV BLD AUTO: 7.8 FL (ref 7.4–10.3)
POTASSIUM SERPL-SCNC: 3.6 MMOL/L (ref 3.3–5.1)
POTASSIUM SERPL-SCNC: 3.7 MMOL/L (ref 3.3–5.1)
POTASSIUM SERPL-SCNC: 3.8 MMOL/L (ref 3.3–5.1)
POTASSIUM SERPL-SCNC: 3.8 MMOL/L (ref 3.3–5.1)
POTASSIUM SERPL-SCNC: 3.9 MMOL/L (ref 3.3–5.1)
PROT SERPL-MCNC: 7.1 G/DL (ref 5.9–8.4)
PROT SERPL-MCNC: 7.2 G/DL (ref 5.9–8.4)
PROT SERPL-MCNC: 7.7 G/DL (ref 5.9–8.4)
PROT UR-MCNC: NEGATIVE MG/DL
PROTEIN (URINE DIPSTICK): NEGATIVE MG/DL
RBC # BLD AUTO: 3.92 M/UL (ref 3.7–5.4)
RBC # BLD AUTO: 4.51 M/UL (ref 3.7–5.4)
RBC # BLD AUTO: 4.97 M/UL (ref 3.7–5.4)
RBC # BLD AUTO: 5.06 M/UL (ref 3.7–5.4)
RBC #/AREA URNS AUTO: 1 /HPF
SODIUM SERPL-SCNC: 136 MMOL/L (ref 136–144)
SODIUM SERPL-SCNC: 137 MMOL/L (ref 136–144)
SODIUM SERPL-SCNC: 137 MMOL/L (ref 136–144)
SODIUM SERPL-SCNC: 139 MMOL/L (ref 136–144)
SODIUM SERPL-SCNC: 140 MMOL/L (ref 136–144)
SP GR UR STRIP: 1.02 (ref 1–1.03)
SPECIFIC GRAVITY: 1.02 (ref 1–1.03)
T4 FREE SERPL-MCNC: 1.53 NG/DL (ref 0.58–1.64)
TITIN ANTIBODY: <0.09 IV
TRIGL SERPL-MCNC: 127 MG/DL (ref 1–149)
TRIGL SERPL-MCNC: 132 MG/DL (ref 1–149)
TROPONIN I SERPL-MCNC: 0.01 NG/ML (ref ?–0.03)
TSH SERPL-ACNC: 2.85 UIU/ML (ref 0.45–5.33)
URINE-COLOR: YELLOW
UROBILINOGEN UR STRIP-ACNC: <2
UROBILINOGEN,SEMI-QN: 0.2 MG/DL (ref 0–1.9)
VIT B12 SERPL-MCNC: 334 PG/ML (ref 181–914)
VIT C UR-MCNC: NEGATIVE MG/DL
WBC # BLD AUTO: 6.1 K/UL (ref 4–11)
WBC # BLD AUTO: 6.3 K/UL (ref 4–11)
WBC # BLD AUTO: 6.6 K/UL (ref 4–11)
WBC # BLD AUTO: 7.2 K/UL (ref 4–11)
WBC #/AREA URNS AUTO: 14 /HPF

## 2018-01-01 PROCEDURE — 99214 OFFICE O/P EST MOD 30 MIN: CPT | Performed by: INTERNAL MEDICINE

## 2018-01-01 PROCEDURE — 73721 MRI JNT OF LWR EXTRE W/O DYE: CPT | Performed by: ORTHOPAEDIC SURGERY

## 2018-01-01 PROCEDURE — 97110 THERAPEUTIC EXERCISES: CPT

## 2018-01-01 PROCEDURE — 85025 COMPLETE CBC W/AUTO DIFF WBC: CPT | Performed by: OTHER

## 2018-01-01 PROCEDURE — 92611 MOTION FLUOROSCOPY/SWALLOW: CPT

## 2018-01-01 PROCEDURE — 86225 DNA ANTIBODY NATIVE: CPT

## 2018-01-01 PROCEDURE — 85025 COMPLETE CBC W/AUTO DIFF WBC: CPT

## 2018-01-01 PROCEDURE — 36415 COLL VENOUS BLD VENIPUNCTURE: CPT | Performed by: NURSE PRACTITIONER

## 2018-01-01 PROCEDURE — 92526 ORAL FUNCTION THERAPY: CPT

## 2018-01-01 PROCEDURE — 99214 OFFICE O/P EST MOD 30 MIN: CPT | Performed by: NURSE PRACTITIONER

## 2018-01-01 PROCEDURE — 31575 DIAGNOSTIC LARYNGOSCOPY: CPT | Performed by: OTOLARYNGOLOGY

## 2018-01-01 PROCEDURE — 73560 X-RAY EXAM OF KNEE 1 OR 2: CPT | Performed by: ORTHOPAEDIC SURGERY

## 2018-01-01 PROCEDURE — 92507 TX SP LANG VOICE COMM INDIV: CPT

## 2018-01-01 PROCEDURE — 93225 XTRNL ECG REC<48 HRS REC: CPT | Performed by: INTERNAL MEDICINE

## 2018-01-01 PROCEDURE — 5A2204Z RESTORATION OF CARDIAC RHYTHM, SINGLE: ICD-10-PCS | Performed by: INTERNAL MEDICINE

## 2018-01-01 PROCEDURE — 72141 MRI NECK SPINE W/O DYE: CPT | Performed by: OTHER

## 2018-01-01 PROCEDURE — 93005 ELECTROCARDIOGRAM TRACING: CPT

## 2018-01-01 PROCEDURE — 82043 UR ALBUMIN QUANTITATIVE: CPT

## 2018-01-01 PROCEDURE — 92522 EVALUATE SPEECH PRODUCTION: CPT

## 2018-01-01 PROCEDURE — G0463 HOSPITAL OUTPT CLINIC VISIT: HCPCS | Performed by: INTERNAL MEDICINE

## 2018-01-01 PROCEDURE — 80053 COMPREHEN METABOLIC PANEL: CPT

## 2018-01-01 PROCEDURE — 70551 MRI BRAIN STEM W/O DYE: CPT | Performed by: INTERNAL MEDICINE

## 2018-01-01 PROCEDURE — 97140 MANUAL THERAPY 1/> REGIONS: CPT

## 2018-01-01 PROCEDURE — A4216 STERILE WATER/SALINE, 10 ML: HCPCS

## 2018-01-01 PROCEDURE — 36415 COLL VENOUS BLD VENIPUNCTURE: CPT

## 2018-01-01 PROCEDURE — 86901 BLOOD TYPING SEROLOGIC RH(D): CPT

## 2018-01-01 PROCEDURE — 99215 OFFICE O/P EST HI 40 MIN: CPT | Performed by: INTERNAL MEDICINE

## 2018-01-01 PROCEDURE — 93010 ELECTROCARDIOGRAM REPORT: CPT | Performed by: INTERNAL MEDICINE

## 2018-01-01 PROCEDURE — 97530 THERAPEUTIC ACTIVITIES: CPT

## 2018-01-01 PROCEDURE — 86900 BLOOD TYPING SEROLOGIC ABO: CPT

## 2018-01-01 PROCEDURE — 84443 ASSAY THYROID STIM HORMONE: CPT

## 2018-01-01 PROCEDURE — 81003 URINALYSIS AUTO W/O SCOPE: CPT | Performed by: INTERNAL MEDICINE

## 2018-01-01 PROCEDURE — 99213 OFFICE O/P EST LOW 20 MIN: CPT | Performed by: INTERNAL MEDICINE

## 2018-01-01 PROCEDURE — 87081 CULTURE SCREEN ONLY: CPT

## 2018-01-01 PROCEDURE — 93306 TTE W/DOPPLER COMPLETE: CPT | Performed by: OTHER

## 2018-01-01 PROCEDURE — 96160 PT-FOCUSED HLTH RISK ASSMT: CPT | Performed by: INTERNAL MEDICINE

## 2018-01-01 PROCEDURE — 80053 COMPREHEN METABOLIC PANEL: CPT | Performed by: OTHER

## 2018-01-01 PROCEDURE — 93018 CV STRESS TEST I&R ONLY: CPT | Performed by: INTERNAL MEDICINE

## 2018-01-01 PROCEDURE — 96360 HYDRATION IV INFUSION INIT: CPT

## 2018-01-01 PROCEDURE — 86235 NUCLEAR ANTIGEN ANTIBODY: CPT

## 2018-01-01 PROCEDURE — 97161 PT EVAL LOW COMPLEX 20 MIN: CPT | Performed by: PHYSICAL THERAPIST

## 2018-01-01 PROCEDURE — 36415 COLL VENOUS BLD VENIPUNCTURE: CPT | Performed by: INTERNAL MEDICINE

## 2018-01-01 PROCEDURE — 0SRD0J9 REPLACEMENT OF LEFT KNEE JOINT WITH SYNTHETIC SUBSTITUTE, CEMENTED, OPEN APPROACH: ICD-10-PCS | Performed by: ORTHOPAEDIC SURGERY

## 2018-01-01 PROCEDURE — 83036 HEMOGLOBIN GLYCOSYLATED A1C: CPT

## 2018-01-01 PROCEDURE — 85025 COMPLETE CBC W/AUTO DIFF WBC: CPT | Performed by: EMERGENCY MEDICINE

## 2018-01-01 PROCEDURE — 99204 OFFICE O/P NEW MOD 45 MIN: CPT | Performed by: INTERNAL MEDICINE

## 2018-01-01 PROCEDURE — G0463 HOSPITAL OUTPT CLINIC VISIT: HCPCS | Performed by: OTOLARYNGOLOGY

## 2018-01-01 PROCEDURE — 97110 THERAPEUTIC EXERCISES: CPT | Performed by: PHYSICAL THERAPIST

## 2018-01-01 PROCEDURE — 80048 BASIC METABOLIC PNL TOTAL CA: CPT

## 2018-01-01 PROCEDURE — 93016 CV STRESS TEST SUPVJ ONLY: CPT | Performed by: INTERNAL MEDICINE

## 2018-01-01 PROCEDURE — 84439 ASSAY OF FREE THYROXINE: CPT

## 2018-01-01 PROCEDURE — 83090 ASSAY OF HOMOCYSTEINE: CPT

## 2018-01-01 PROCEDURE — 85610 PROTHROMBIN TIME: CPT

## 2018-01-01 PROCEDURE — 93017 CV STRESS TEST TRACING ONLY: CPT | Performed by: INTERNAL MEDICINE

## 2018-01-01 PROCEDURE — 78452 HT MUSCLE IMAGE SPECT MULT: CPT | Performed by: INTERNAL MEDICINE

## 2018-01-01 PROCEDURE — 81001 URINALYSIS AUTO W/SCOPE: CPT | Performed by: EMERGENCY MEDICINE

## 2018-01-01 PROCEDURE — 80053 COMPREHEN METABOLIC PANEL: CPT | Performed by: NURSE PRACTITIONER

## 2018-01-01 PROCEDURE — 93227 XTRNL ECG REC<48 HR R&I: CPT | Performed by: INTERNAL MEDICINE

## 2018-01-01 PROCEDURE — 93000 ELECTROCARDIOGRAM COMPLETE: CPT | Performed by: INTERNAL MEDICINE

## 2018-01-01 PROCEDURE — 96361 HYDRATE IV INFUSION ADD-ON: CPT

## 2018-01-01 PROCEDURE — 93010 ELECTROCARDIOGRAM REPORT: CPT | Performed by: NURSE PRACTITIONER

## 2018-01-01 PROCEDURE — 80061 LIPID PANEL: CPT

## 2018-01-01 PROCEDURE — 99213 OFFICE O/P EST LOW 20 MIN: CPT | Performed by: OTOLARYNGOLOGY

## 2018-01-01 PROCEDURE — 80048 BASIC METABOLIC PNL TOTAL CA: CPT | Performed by: NURSE PRACTITIONER

## 2018-01-01 PROCEDURE — 70450 CT HEAD/BRAIN W/O DYE: CPT | Performed by: EMERGENCY MEDICINE

## 2018-01-01 PROCEDURE — 86334 IMMUNOFIX E-PHORESIS SERUM: CPT

## 2018-01-01 PROCEDURE — 86038 ANTINUCLEAR ANTIBODIES: CPT

## 2018-01-01 PROCEDURE — 82607 VITAMIN B-12: CPT | Performed by: OTHER

## 2018-01-01 PROCEDURE — 99204 OFFICE O/P NEW MOD 45 MIN: CPT | Performed by: OTHER

## 2018-01-01 PROCEDURE — 99214 OFFICE O/P EST MOD 30 MIN: CPT | Performed by: OTHER

## 2018-01-01 PROCEDURE — 99284 EMERGENCY DEPT VISIT MOD MDM: CPT

## 2018-01-01 PROCEDURE — 95886 MUSC TEST DONE W/N TEST COMP: CPT | Performed by: OTHER

## 2018-01-01 PROCEDURE — 86039 ANTINUCLEAR ANTIBODIES (ANA): CPT

## 2018-01-01 PROCEDURE — 82306 VITAMIN D 25 HYDROXY: CPT

## 2018-01-01 PROCEDURE — 0DJ08ZZ INSPECTION OF UPPER INTESTINAL TRACT, VIA NATURAL OR ARTIFICIAL OPENING ENDOSCOPIC: ICD-10-PCS | Performed by: INTERNAL MEDICINE

## 2018-01-01 PROCEDURE — 80048 BASIC METABOLIC PNL TOTAL CA: CPT | Performed by: EMERGENCY MEDICINE

## 2018-01-01 PROCEDURE — 99212 OFFICE O/P EST SF 10 MIN: CPT | Performed by: NURSE PRACTITIONER

## 2018-01-01 PROCEDURE — 74177 CT ABD & PELVIS W/CONTRAST: CPT | Performed by: INTERNAL MEDICINE

## 2018-01-01 PROCEDURE — 92960 CARDIOVERSION ELECTRIC EXT: CPT

## 2018-01-01 PROCEDURE — 82570 ASSAY OF URINE CREATININE: CPT

## 2018-01-01 PROCEDURE — 86255 FLUORESCENT ANTIBODY SCREEN: CPT

## 2018-01-01 PROCEDURE — 97112 NEUROMUSCULAR REEDUCATION: CPT | Performed by: PHYSICAL THERAPIST

## 2018-01-01 PROCEDURE — 71046 X-RAY EXAM CHEST 2 VIEWS: CPT | Performed by: EMERGENCY MEDICINE

## 2018-01-01 PROCEDURE — 99232 SBSQ HOSP IP/OBS MODERATE 35: CPT | Performed by: INTERNAL MEDICINE

## 2018-01-01 PROCEDURE — 71046 X-RAY EXAM CHEST 2 VIEWS: CPT | Performed by: INTERNAL MEDICINE

## 2018-01-01 PROCEDURE — 99233 SBSQ HOSP IP/OBS HIGH 50: CPT | Performed by: INTERNAL MEDICINE

## 2018-01-01 PROCEDURE — 92610 EVALUATE SWALLOWING FUNCTION: CPT

## 2018-01-01 PROCEDURE — 83735 ASSAY OF MAGNESIUM: CPT | Performed by: NURSE PRACTITIONER

## 2018-01-01 PROCEDURE — 86850 RBC ANTIBODY SCREEN: CPT

## 2018-01-01 PROCEDURE — 74230 X-RAY XM SWLNG FUNCJ C+: CPT | Performed by: INTERNAL MEDICINE

## 2018-01-01 PROCEDURE — 96372 THER/PROPH/DIAG INJ SC/IM: CPT | Performed by: INTERNAL MEDICINE

## 2018-01-01 PROCEDURE — 82565 ASSAY OF CREATININE: CPT

## 2018-01-01 PROCEDURE — 99222 1ST HOSP IP/OBS MODERATE 55: CPT | Performed by: INTERNAL MEDICINE

## 2018-01-01 PROCEDURE — 43235 EGD DIAGNOSTIC BRUSH WASH: CPT | Performed by: INTERNAL MEDICINE

## 2018-01-01 PROCEDURE — 97112 NEUROMUSCULAR REEDUCATION: CPT

## 2018-01-01 PROCEDURE — 93010 ELECTROCARDIOGRAM REPORT: CPT | Performed by: EMERGENCY MEDICINE

## 2018-01-01 PROCEDURE — 85730 THROMBOPLASTIN TIME PARTIAL: CPT

## 2018-01-01 PROCEDURE — 95913 NRV CNDJ TEST 13/> STUDIES: CPT | Performed by: OTHER

## 2018-01-01 PROCEDURE — 93880 EXTRACRANIAL BILAT STUDY: CPT | Performed by: OTHER

## 2018-01-01 PROCEDURE — 83735 ASSAY OF MAGNESIUM: CPT | Performed by: EMERGENCY MEDICINE

## 2018-01-01 DEVICE — PSN ASF PS 12MM PLY L 6-9EF: Type: IMPLANTABLE DEVICE | Site: KNEE | Status: FUNCTIONAL

## 2018-01-01 DEVICE — PSN ALL POLY PAT PLY 38MM: Type: IMPLANTABLE DEVICE | Site: KNEE | Status: FUNCTIONAL

## 2018-01-01 DEVICE — PSN TIB STM 5 DEG SZ E L: Type: IMPLANTABLE DEVICE | Site: KNEE | Status: FUNCTIONAL

## 2018-01-01 DEVICE — BONE CEMENT HI VISCOSITY R: Type: IMPLANTABLE DEVICE | Site: KNEE | Status: FUNCTIONAL

## 2018-01-01 DEVICE — PSN FEM PS CMT CCR STD SZ7 L: Type: IMPLANTABLE DEVICE | Site: KNEE | Status: FUNCTIONAL

## 2018-01-01 RX ORDER — FLUTICASONE PROPIONATE 50 MCG
SPRAY, SUSPENSION (ML) NASAL DAILY
COMMUNITY
End: 2018-01-01

## 2018-01-01 RX ORDER — RIVAROXABAN 20 MG/1
20 TABLET, FILM COATED ORAL
COMMUNITY
Start: 2018-01-01

## 2018-01-01 RX ORDER — PROPAFENONE HYDROCHLORIDE 150 MG/1
150 TABLET, FILM COATED ORAL 2 TIMES DAILY
Status: DISCONTINUED | OUTPATIENT
Start: 2018-01-01 | End: 2018-01-01

## 2018-01-01 RX ORDER — DEXTROSE MONOHYDRATE 25 G/50ML
50 INJECTION, SOLUTION INTRAVENOUS
Status: DISCONTINUED | OUTPATIENT
Start: 2018-01-01 | End: 2018-01-01

## 2018-01-01 RX ORDER — DEXTROSE MONOHYDRATE 25 G/50ML
50 INJECTION, SOLUTION INTRAVENOUS
Status: CANCELLED | OUTPATIENT
Start: 2018-01-01

## 2018-01-01 RX ORDER — DIPHENHYDRAMINE HCL 25 MG
25 CAPSULE ORAL EVERY 4 HOURS PRN
Status: ACTIVE | OUTPATIENT
Start: 2018-01-01 | End: 2018-01-01

## 2018-01-01 RX ORDER — METOCLOPRAMIDE 10 MG/1
10 TABLET ORAL ONCE
Status: DISCONTINUED | OUTPATIENT
Start: 2018-01-01 | End: 2018-01-01 | Stop reason: HOSPADM

## 2018-01-01 RX ORDER — PRAVASTATIN SODIUM 20 MG
20 TABLET ORAL NIGHTLY
Status: DISCONTINUED | OUTPATIENT
Start: 2018-01-01 | End: 2018-01-01

## 2018-01-01 RX ORDER — SODIUM CHLORIDE, SODIUM LACTATE, POTASSIUM CHLORIDE, CALCIUM CHLORIDE 600; 310; 30; 20 MG/100ML; MG/100ML; MG/100ML; MG/100ML
INJECTION, SOLUTION INTRAVENOUS CONTINUOUS
Status: DISCONTINUED | OUTPATIENT
Start: 2018-01-01 | End: 2018-01-01

## 2018-01-01 RX ORDER — NALOXONE HYDROCHLORIDE 0.4 MG/ML
0.08 INJECTION, SOLUTION INTRAMUSCULAR; INTRAVENOUS; SUBCUTANEOUS
Status: ACTIVE | OUTPATIENT
Start: 2018-01-01 | End: 2018-01-01

## 2018-01-01 RX ORDER — DOCUSATE SODIUM 100 MG/1
100 CAPSULE, LIQUID FILLED ORAL DAILY
Status: DISCONTINUED | OUTPATIENT
Start: 2018-01-01 | End: 2018-01-01

## 2018-01-01 RX ORDER — HYDROCHLOROTHIAZIDE 25 MG/1
25 TABLET ORAL DAILY
Status: DISCONTINUED | OUTPATIENT
Start: 2018-01-01 | End: 2018-01-01

## 2018-01-01 RX ORDER — SIMVASTATIN 5 MG
10 TABLET ORAL NIGHTLY
Qty: 180 TABLET | Refills: 1 | Status: SHIPPED | OUTPATIENT
Start: 2018-01-01 | End: 2018-01-01

## 2018-01-01 RX ORDER — SODIUM PHOSPHATE, DIBASIC AND SODIUM PHOSPHATE, MONOBASIC 7; 19 G/133ML; G/133ML
1 ENEMA RECTAL ONCE AS NEEDED
Status: DISCONTINUED | OUTPATIENT
Start: 2018-01-01 | End: 2018-01-01

## 2018-01-01 RX ORDER — FLUTICASONE PROPIONATE 50 MCG
1 SPRAY, SUSPENSION (ML) NASAL DAILY
Status: DISCONTINUED | OUTPATIENT
Start: 2018-01-01 | End: 2018-01-01

## 2018-01-01 RX ORDER — LEVOTHYROXINE SODIUM 0.1 MG/1
100 TABLET ORAL
Status: DISCONTINUED | OUTPATIENT
Start: 2018-01-01 | End: 2018-01-01

## 2018-01-01 RX ORDER — FAMOTIDINE 20 MG/1
20 TABLET ORAL ONCE
Status: DISCONTINUED | OUTPATIENT
Start: 2018-01-01 | End: 2018-01-01 | Stop reason: HOSPADM

## 2018-01-01 RX ORDER — NALBUPHINE HCL 10 MG/ML
2.5 AMPUL (ML) INJECTION EVERY 4 HOURS PRN
Status: DISCONTINUED | OUTPATIENT
Start: 2018-01-01 | End: 2018-01-01

## 2018-01-01 RX ORDER — HYDROCODONE BITARTRATE AND ACETAMINOPHEN 5; 325 MG/1; MG/1
1 TABLET ORAL AS NEEDED
Status: DISCONTINUED | OUTPATIENT
Start: 2018-01-01 | End: 2018-01-01 | Stop reason: HOSPADM

## 2018-01-01 RX ORDER — CHLORHEXIDINE GLUCONATE 0.12 MG/ML
15 RINSE ORAL DAILY PRN
Status: DISCONTINUED | OUTPATIENT
Start: 2018-01-01 | End: 2018-01-01

## 2018-01-01 RX ORDER — LISINOPRIL AND HYDROCHLOROTHIAZIDE 20; 12.5 MG/1; MG/1
1 TABLET ORAL DAILY
Qty: 60 TABLET | Refills: 1 | COMMUNITY
Start: 2018-01-01 | End: 2018-01-01

## 2018-01-01 RX ORDER — SODIUM CHLORIDE 9 MG/ML
INJECTION, SOLUTION INTRAVENOUS ONCE
Status: COMPLETED | OUTPATIENT
Start: 2018-01-01 | End: 2018-01-01

## 2018-01-01 RX ORDER — DIPHENHYDRAMINE HYDROCHLORIDE 50 MG/ML
25 INJECTION INTRAMUSCULAR; INTRAVENOUS ONCE AS NEEDED
Status: ACTIVE | OUTPATIENT
Start: 2018-01-01 | End: 2018-01-01

## 2018-01-01 RX ORDER — MONTELUKAST SODIUM 10 MG/1
10 TABLET ORAL NIGHTLY
Qty: 30 TABLET | Refills: 0 | Status: ON HOLD | OUTPATIENT
Start: 2018-01-01 | End: 2018-01-01

## 2018-01-01 RX ORDER — DIPHENHYDRAMINE HYDROCHLORIDE 50 MG/ML
12.5 INJECTION INTRAMUSCULAR; INTRAVENOUS EVERY 4 HOURS PRN
Status: ACTIVE | OUTPATIENT
Start: 2018-01-01 | End: 2018-01-01

## 2018-01-01 RX ORDER — BISACODYL 10 MG
10 SUPPOSITORY, RECTAL RECTAL
Status: DISCONTINUED | OUTPATIENT
Start: 2018-01-01 | End: 2018-01-01

## 2018-01-01 RX ORDER — SODIUM CHLORIDE 9 MG/ML
INJECTION, SOLUTION INTRAVENOUS
Status: DISCONTINUED
Start: 2018-01-01 | End: 2018-01-01

## 2018-01-01 RX ORDER — NAPROXEN 500 MG/1
500 TABLET ORAL 2 TIMES DAILY WITH MEALS
Qty: 60 TABLET | Refills: 0 | Status: ON HOLD | OUTPATIENT
Start: 2018-01-01 | End: 2018-01-01

## 2018-01-01 RX ORDER — CEFAZOLIN SODIUM/WATER 2 G/20 ML
2 SYRINGE (ML) INTRAVENOUS ONCE
Status: COMPLETED | OUTPATIENT
Start: 2018-01-01 | End: 2018-01-01

## 2018-01-01 RX ORDER — SIMVASTATIN 10 MG
TABLET ORAL
Refills: 0 | COMMUNITY
Start: 2018-01-01 | End: 2018-01-01

## 2018-01-01 RX ORDER — ACETAMINOPHEN 325 MG/1
325 TABLET ORAL EVERY 6 HOURS PRN
Status: DISCONTINUED | OUTPATIENT
Start: 2018-01-01 | End: 2018-01-01

## 2018-01-01 RX ORDER — MORPHINE SULFATE 4 MG/ML
1 INJECTION, SOLUTION INTRAMUSCULAR; INTRAVENOUS EVERY 2 HOUR PRN
Status: DISCONTINUED | OUTPATIENT
Start: 2018-01-01 | End: 2018-01-01

## 2018-01-01 RX ORDER — PRAVASTATIN SODIUM 10 MG
10 TABLET ORAL NIGHTLY
Status: DISCONTINUED | OUTPATIENT
Start: 2018-01-01 | End: 2018-01-01

## 2018-01-01 RX ORDER — SODIUM CHLORIDE 9 MG/ML
INJECTION, SOLUTION INTRAVENOUS CONTINUOUS
Status: CANCELLED | OUTPATIENT
Start: 2018-01-01

## 2018-01-01 RX ORDER — METOCLOPRAMIDE HYDROCHLORIDE 5 MG/ML
10 INJECTION INTRAMUSCULAR; INTRAVENOUS EVERY 6 HOURS PRN
Status: DISPENSED | OUTPATIENT
Start: 2018-01-01 | End: 2018-01-01

## 2018-01-01 RX ORDER — HYDROCODONE BITARTRATE AND ACETAMINOPHEN 5; 325 MG/1; MG/1
2 TABLET ORAL AS NEEDED
Status: DISCONTINUED | OUTPATIENT
Start: 2018-01-01 | End: 2018-01-01 | Stop reason: HOSPADM

## 2018-01-01 RX ORDER — HYDROCODONE BITARTRATE AND ACETAMINOPHEN 5; 325 MG/1; MG/1
1 TABLET ORAL AS NEEDED
Status: DISCONTINUED | OUTPATIENT
Start: 2018-01-01 | End: 2018-01-01

## 2018-01-01 RX ORDER — PSEUDOEPHEDRINE HCL 30 MG
100 TABLET ORAL DAILY
Status: DISCONTINUED | OUTPATIENT
Start: 2018-01-01 | End: 2018-01-01

## 2018-01-01 RX ORDER — LISINOPRIL AND HYDROCHLOROTHIAZIDE 20; 12.5 MG/1; MG/1
2 TABLET ORAL DAILY
Status: DISCONTINUED | OUTPATIENT
Start: 2018-01-01 | End: 2018-01-01 | Stop reason: RX

## 2018-01-01 RX ORDER — PROPAFENONE HYDROCHLORIDE 150 MG/1
150 TABLET, FILM COATED ORAL EVERY 8 HOURS SCHEDULED
Status: DISCONTINUED | OUTPATIENT
Start: 2018-01-01 | End: 2018-01-01

## 2018-01-01 RX ORDER — SODIUM CHLORIDE 9 MG/ML
INJECTION, SOLUTION INTRAVENOUS CONTINUOUS
Status: DISCONTINUED | OUTPATIENT
Start: 2018-01-01 | End: 2018-01-01

## 2018-01-01 RX ORDER — SODIUM CHLORIDE 0.9 % (FLUSH) 0.9 %
10 SYRINGE (ML) INJECTION AS NEEDED
Status: DISCONTINUED | OUTPATIENT
Start: 2018-01-01 | End: 2018-01-01

## 2018-01-01 RX ORDER — HYDROMORPHONE HYDROCHLORIDE 1 MG/ML
0.6 INJECTION, SOLUTION INTRAMUSCULAR; INTRAVENOUS; SUBCUTANEOUS EVERY 5 MIN PRN
Status: DISCONTINUED | OUTPATIENT
Start: 2018-01-01 | End: 2018-01-01 | Stop reason: HOSPADM

## 2018-01-01 RX ORDER — MORPHINE SULFATE 4 MG/ML
2 INJECTION, SOLUTION INTRAMUSCULAR; INTRAVENOUS EVERY 10 MIN PRN
Status: DISCONTINUED | OUTPATIENT
Start: 2018-01-01 | End: 2018-01-01

## 2018-01-01 RX ORDER — DIPHENHYDRAMINE HCL 25 MG
25 CAPSULE ORAL EVERY 4 HOURS PRN
Status: DISCONTINUED | OUTPATIENT
Start: 2018-01-01 | End: 2018-01-01

## 2018-01-01 RX ORDER — SIMVASTATIN 10 MG
TABLET ORAL
Qty: 90 TABLET | Refills: 1 | Status: SHIPPED | OUTPATIENT
Start: 2018-01-01

## 2018-01-01 RX ORDER — HYDROCODONE BITARTRATE AND ACETAMINOPHEN 5; 325 MG/1; MG/1
2 TABLET ORAL AS NEEDED
Status: DISCONTINUED | OUTPATIENT
Start: 2018-01-01 | End: 2018-01-01

## 2018-01-01 RX ORDER — HYDROMORPHONE HYDROCHLORIDE 1 MG/ML
0.4 INJECTION, SOLUTION INTRAMUSCULAR; INTRAVENOUS; SUBCUTANEOUS EVERY 5 MIN PRN
Status: DISCONTINUED | OUTPATIENT
Start: 2018-01-01 | End: 2018-01-01 | Stop reason: HOSPADM

## 2018-01-01 RX ORDER — PSEUDOEPHEDRINE HCL 30 MG
100 TABLET ORAL AS NEEDED
COMMUNITY
Start: 2018-01-01

## 2018-01-01 RX ORDER — NAPROXEN 500 MG/1
500 TABLET ORAL 2 TIMES DAILY WITH MEALS
Status: DISCONTINUED | OUTPATIENT
Start: 2018-01-01 | End: 2018-01-01

## 2018-01-01 RX ORDER — DOCUSATE SODIUM 100 MG/1
100 CAPSULE, LIQUID FILLED ORAL 2 TIMES DAILY
Status: DISCONTINUED | OUTPATIENT
Start: 2018-01-01 | End: 2018-01-01

## 2018-01-01 RX ORDER — DOXEPIN HYDROCHLORIDE 50 MG/1
1 CAPSULE ORAL DAILY
Status: DISCONTINUED | OUTPATIENT
Start: 2018-01-01 | End: 2018-01-01

## 2018-01-01 RX ORDER — HYDROCODONE BITARTRATE AND ACETAMINOPHEN 7.5; 325 MG/1; MG/1
1 TABLET ORAL EVERY 6 HOURS PRN
Status: DISCONTINUED | OUTPATIENT
Start: 2018-01-01 | End: 2018-01-01

## 2018-01-01 RX ORDER — ONDANSETRON 2 MG/ML
4 INJECTION INTRAMUSCULAR; INTRAVENOUS ONCE AS NEEDED
Status: DISCONTINUED | OUTPATIENT
Start: 2018-01-01 | End: 2018-01-01 | Stop reason: HOSPADM

## 2018-01-01 RX ORDER — ACETAMINOPHEN 500 MG
1000 TABLET ORAL ONCE
Status: COMPLETED | OUTPATIENT
Start: 2018-01-01 | End: 2018-01-01

## 2018-01-01 RX ORDER — NALOXONE HYDROCHLORIDE 0.4 MG/ML
80 INJECTION, SOLUTION INTRAMUSCULAR; INTRAVENOUS; SUBCUTANEOUS AS NEEDED
Status: DISCONTINUED | OUTPATIENT
Start: 2018-01-01 | End: 2018-01-01

## 2018-01-01 RX ORDER — MORPHINE SULFATE 4 MG/ML
4 INJECTION, SOLUTION INTRAMUSCULAR; INTRAVENOUS EVERY 10 MIN PRN
Status: DISCONTINUED | OUTPATIENT
Start: 2018-01-01 | End: 2018-01-01

## 2018-01-01 RX ORDER — DRONEDARONE 400 MG/1
1 TABLET, FILM COATED ORAL 2 TIMES DAILY
Refills: 3 | COMMUNITY
Start: 2018-01-01

## 2018-01-01 RX ORDER — LIDOCAINE HYDROCHLORIDE 10 MG/ML
INJECTION, SOLUTION EPIDURAL; INFILTRATION; INTRACAUDAL; PERINEURAL AS NEEDED
Status: DISCONTINUED | OUTPATIENT
Start: 2018-01-01 | End: 2018-01-01 | Stop reason: SURG

## 2018-01-01 RX ORDER — DIPHENHYDRAMINE HYDROCHLORIDE 50 MG/ML
12.5 INJECTION INTRAMUSCULAR; INTRAVENOUS EVERY 4 HOURS PRN
Status: DISCONTINUED | OUTPATIENT
Start: 2018-01-01 | End: 2018-01-01

## 2018-01-01 RX ORDER — RIVAROXABAN 20 MG/1
10 TABLET, FILM COATED ORAL
Qty: 20 TABLET | Refills: 1 | Status: SHIPPED | OUTPATIENT
Start: 2018-01-01 | End: 2018-01-01

## 2018-01-01 RX ORDER — BUPIVACAINE HYDROCHLORIDE 7.5 MG/ML
INJECTION, SOLUTION INTRASPINAL AS NEEDED
Status: DISCONTINUED | OUTPATIENT
Start: 2018-01-01 | End: 2018-01-01 | Stop reason: SURG

## 2018-01-01 RX ORDER — LISINOPRIL 40 MG/1
40 TABLET ORAL DAILY
Status: DISCONTINUED | OUTPATIENT
Start: 2018-01-01 | End: 2018-01-01

## 2018-01-01 RX ORDER — 0.9 % SODIUM CHLORIDE 0.9 %
VIAL (ML) INJECTION
Status: COMPLETED
Start: 2018-01-01 | End: 2018-01-01

## 2018-01-01 RX ORDER — CYANOCOBALAMIN 1000 UG/ML
1000 INJECTION INTRAMUSCULAR; SUBCUTANEOUS ONCE
Status: COMPLETED | OUTPATIENT
Start: 2018-01-01 | End: 2018-01-01

## 2018-01-01 RX ORDER — MORPHINE SULFATE 4 MG/ML
4 INJECTION, SOLUTION INTRAMUSCULAR; INTRAVENOUS EVERY 2 HOUR PRN
Status: DISCONTINUED | OUTPATIENT
Start: 2018-01-01 | End: 2018-01-01

## 2018-01-01 RX ORDER — HYDROMORPHONE HYDROCHLORIDE 1 MG/ML
0.6 INJECTION, SOLUTION INTRAMUSCULAR; INTRAVENOUS; SUBCUTANEOUS
Status: ACTIVE | OUTPATIENT
Start: 2018-01-01 | End: 2018-01-01

## 2018-01-01 RX ORDER — CEFAZOLIN SODIUM/WATER 2 G/20 ML
2 SYRINGE (ML) INTRAVENOUS EVERY 8 HOURS
Status: COMPLETED | OUTPATIENT
Start: 2018-01-01 | End: 2018-01-01

## 2018-01-01 RX ORDER — SENNOSIDES 8.6 MG
17.2 TABLET ORAL NIGHTLY
Status: DISCONTINUED | OUTPATIENT
Start: 2018-01-01 | End: 2018-01-01

## 2018-01-01 RX ORDER — MORPHINE SULFATE 4 MG/ML
2 INJECTION, SOLUTION INTRAMUSCULAR; INTRAVENOUS EVERY 2 HOUR PRN
Status: DISCONTINUED | OUTPATIENT
Start: 2018-01-01 | End: 2018-01-01

## 2018-01-01 RX ORDER — MORPHINE SULFATE 1 MG/ML
INJECTION, SOLUTION EPIDURAL; INTRATHECAL; INTRAVENOUS AS NEEDED
Status: DISCONTINUED | OUTPATIENT
Start: 2018-01-01 | End: 2018-01-01 | Stop reason: SURG

## 2018-01-01 RX ORDER — HYDROCODONE BITARTRATE AND ACETAMINOPHEN 10; 325 MG/1; MG/1
1-2 TABLET ORAL EVERY 6 HOURS PRN
Qty: 30 TABLET | Refills: 0 | Status: ON HOLD | OUTPATIENT
Start: 2018-01-01 | End: 2018-01-01

## 2018-01-01 RX ORDER — HYDROCODONE BITARTRATE AND ACETAMINOPHEN 7.5; 325 MG/1; MG/1
2 TABLET ORAL EVERY 6 HOURS PRN
Status: DISCONTINUED | OUTPATIENT
Start: 2018-01-01 | End: 2018-01-01

## 2018-01-01 RX ORDER — HYDROCODONE BITARTRATE AND ACETAMINOPHEN 10; 325 MG/1; MG/1
1 TABLET ORAL EVERY 4 HOURS PRN
Status: DISCONTINUED | OUTPATIENT
Start: 2018-01-01 | End: 2018-01-01

## 2018-01-01 RX ORDER — LISINOPRIL AND HYDROCHLOROTHIAZIDE 20; 12.5 MG/1; MG/1
1 TABLET ORAL DAILY
Qty: 90 TABLET | Refills: 1 | Status: SHIPPED | OUTPATIENT
Start: 2018-01-01

## 2018-01-01 RX ORDER — NALOXONE HYDROCHLORIDE 0.4 MG/ML
80 INJECTION, SOLUTION INTRAMUSCULAR; INTRAVENOUS; SUBCUTANEOUS AS NEEDED
Status: DISCONTINUED | OUTPATIENT
Start: 2018-01-01 | End: 2018-01-01 | Stop reason: HOSPADM

## 2018-01-01 RX ORDER — HYDROMORPHONE HYDROCHLORIDE 1 MG/ML
0.2 INJECTION, SOLUTION INTRAMUSCULAR; INTRAVENOUS; SUBCUTANEOUS EVERY 5 MIN PRN
Status: DISCONTINUED | OUTPATIENT
Start: 2018-01-01 | End: 2018-01-01 | Stop reason: HOSPADM

## 2018-01-01 RX ORDER — HALOPERIDOL 5 MG/ML
0.25 INJECTION INTRAMUSCULAR ONCE AS NEEDED
Status: DISCONTINUED | OUTPATIENT
Start: 2018-01-01 | End: 2018-01-01

## 2018-01-01 RX ORDER — ACETAMINOPHEN 160 MG
1 TABLET,DISINTEGRATING ORAL DAILY
COMMUNITY

## 2018-01-01 RX ORDER — SODIUM CHLORIDE 9 MG/ML
INJECTION, SOLUTION INTRAVENOUS
Status: COMPLETED
Start: 2018-01-01 | End: 2018-01-01

## 2018-01-01 RX ORDER — LEVOTHYROXINE SODIUM 0.1 MG/1
100 TABLET ORAL
Qty: 90 TABLET | Refills: 1 | Status: SHIPPED | OUTPATIENT
Start: 2018-01-01 | End: 2018-01-01

## 2018-01-01 RX ORDER — ONDANSETRON 2 MG/ML
4 INJECTION INTRAMUSCULAR; INTRAVENOUS EVERY 4 HOURS PRN
Status: DISCONTINUED | OUTPATIENT
Start: 2018-01-01 | End: 2018-01-01

## 2018-01-01 RX ORDER — OYSTER SHELL CALCIUM WITH VITAMIN D 500; 200 MG/1; [IU]/1
2 TABLET, FILM COATED ORAL DAILY
Status: DISCONTINUED | OUTPATIENT
Start: 2018-01-01 | End: 2018-01-01

## 2018-01-01 RX ORDER — PRAVASTATIN SODIUM 20 MG
10 TABLET ORAL NIGHTLY
Status: DISCONTINUED | OUTPATIENT
Start: 2018-01-01 | End: 2018-01-01

## 2018-01-01 RX ORDER — PHENYLEPHRINE HCL 10 MG/ML
VIAL (ML) INJECTION AS NEEDED
Status: DISCONTINUED | OUTPATIENT
Start: 2018-01-01 | End: 2018-01-01 | Stop reason: SURG

## 2018-01-01 RX ORDER — SIMVASTATIN 5 MG
10 TABLET ORAL NIGHTLY
Qty: 180 TABLET | Refills: 0 | Status: SHIPPED | OUTPATIENT
Start: 2018-01-01 | End: 2018-01-01

## 2018-01-01 RX ORDER — MONTELUKAST SODIUM 10 MG/1
10 TABLET ORAL NIGHTLY
Status: DISCONTINUED | OUTPATIENT
Start: 2018-01-01 | End: 2018-01-01

## 2018-01-01 RX ORDER — ONDANSETRON 2 MG/ML
4 INJECTION INTRAMUSCULAR; INTRAVENOUS ONCE AS NEEDED
Status: ACTIVE | OUTPATIENT
Start: 2018-01-01 | End: 2018-01-01

## 2018-01-01 RX ORDER — ACETAMINOPHEN 325 MG/1
650 TABLET ORAL EVERY 6 HOURS PRN
Status: DISCONTINUED | OUTPATIENT
Start: 2018-01-01 | End: 2018-01-01

## 2018-01-01 RX ORDER — SODIUM CHLORIDE, SODIUM LACTATE, POTASSIUM CHLORIDE, CALCIUM CHLORIDE 600; 310; 30; 20 MG/100ML; MG/100ML; MG/100ML; MG/100ML
INJECTION, SOLUTION INTRAVENOUS CONTINUOUS
Status: CANCELLED | OUTPATIENT
Start: 2018-01-01

## 2018-01-01 RX ORDER — SODIUM CHLORIDE, SODIUM LACTATE, POTASSIUM CHLORIDE, CALCIUM CHLORIDE 600; 310; 30; 20 MG/100ML; MG/100ML; MG/100ML; MG/100ML
INJECTION, SOLUTION INTRAVENOUS CONTINUOUS
Status: DISCONTINUED | OUTPATIENT
Start: 2018-01-01 | End: 2018-01-01 | Stop reason: HOSPADM

## 2018-01-01 RX ORDER — NALOXONE HYDROCHLORIDE 0.4 MG/ML
80 INJECTION, SOLUTION INTRAMUSCULAR; INTRAVENOUS; SUBCUTANEOUS AS NEEDED
Status: CANCELLED | OUTPATIENT
Start: 2018-01-01 | End: 2018-01-01

## 2018-01-01 RX ORDER — HYDROMORPHONE HYDROCHLORIDE 1 MG/ML
0.4 INJECTION, SOLUTION INTRAMUSCULAR; INTRAVENOUS; SUBCUTANEOUS
Status: ACTIVE | OUTPATIENT
Start: 2018-01-01 | End: 2018-01-01

## 2018-01-01 RX ORDER — DOXEPIN HYDROCHLORIDE 50 MG/1
1 CAPSULE ORAL DAILY
Qty: 30 TABLET | Refills: 0 | Status: SHIPPED | OUTPATIENT
Start: 2018-01-01 | End: 2018-01-01

## 2018-01-01 RX ORDER — POLYETHYLENE GLYCOL 3350 17 G/17G
17 POWDER, FOR SOLUTION ORAL DAILY PRN
Status: DISCONTINUED | OUTPATIENT
Start: 2018-01-01 | End: 2018-01-01

## 2018-01-01 RX ORDER — DEXTROSE MONOHYDRATE 25 G/50ML
50 INJECTION, SOLUTION INTRAVENOUS
Status: DISCONTINUED | OUTPATIENT
Start: 2018-01-01 | End: 2018-01-01 | Stop reason: HOSPADM

## 2018-01-01 RX ORDER — ACETAMINOPHEN 325 MG/1
325 TABLET ORAL EVERY 6 HOURS PRN
COMMUNITY

## 2018-01-01 RX ORDER — MORPHINE SULFATE 10 MG/ML
6 INJECTION, SOLUTION INTRAMUSCULAR; INTRAVENOUS EVERY 10 MIN PRN
Status: DISCONTINUED | OUTPATIENT
Start: 2018-01-01 | End: 2018-01-01

## 2018-01-01 RX ORDER — FUROSEMIDE 20 MG/1
TABLET ORAL
Status: COMPLETED
Start: 2018-01-01 | End: 2018-01-01

## 2018-01-01 RX ORDER — ONDANSETRON 2 MG/ML
4 INJECTION INTRAMUSCULAR; INTRAVENOUS ONCE AS NEEDED
Status: DISCONTINUED | OUTPATIENT
Start: 2018-01-01 | End: 2018-01-01

## 2018-01-01 RX ADMIN — PHENYLEPHRINE HCL 100 MCG: 10 MG/ML VIAL (ML) INJECTION at 09:23:00

## 2018-01-01 RX ADMIN — BUPIVACAINE HYDROCHLORIDE 1.4 ML: 7.5 INJECTION, SOLUTION INTRASPINAL at 07:46:00

## 2018-01-01 RX ADMIN — CEFAZOLIN SODIUM/WATER 2 G: 2 G/20 ML SYRINGE (ML) INTRAVENOUS at 07:43:00

## 2018-01-01 RX ADMIN — FUROSEMIDE 20 MG: 20 TABLET ORAL at 15:25:00

## 2018-01-01 RX ADMIN — SODIUM CHLORIDE, SODIUM LACTATE, POTASSIUM CHLORIDE, CALCIUM CHLORIDE: 600; 310; 30; 20 INJECTION, SOLUTION INTRAVENOUS at 10:55:00

## 2018-01-01 RX ADMIN — MORPHINE SULFATE 0.25 MG: 1 INJECTION, SOLUTION EPIDURAL; INTRATHECAL; INTRAVENOUS at 07:46:00

## 2018-01-01 RX ADMIN — PHENYLEPHRINE HCL 100 MCG: 10 MG/ML VIAL (ML) INJECTION at 08:20:00

## 2018-01-01 RX ADMIN — SODIUM CHLORIDE, SODIUM LACTATE, POTASSIUM CHLORIDE, CALCIUM CHLORIDE: 600; 310; 30; 20 INJECTION, SOLUTION INTRAVENOUS at 07:34:00

## 2018-01-01 RX ADMIN — SODIUM CHLORIDE, SODIUM LACTATE, POTASSIUM CHLORIDE, CALCIUM CHLORIDE: 600; 310; 30; 20 INJECTION, SOLUTION INTRAVENOUS at 10:09:00

## 2018-01-01 RX ADMIN — SODIUM CHLORIDE: 9 INJECTION, SOLUTION INTRAVENOUS at 13:23:00

## 2018-01-01 RX ADMIN — SODIUM CHLORIDE: 9 INJECTION, SOLUTION INTRAVENOUS at 13:00:00

## 2018-01-01 RX ADMIN — SODIUM CHLORIDE, SODIUM LACTATE, POTASSIUM CHLORIDE, CALCIUM CHLORIDE: 600; 310; 30; 20 INJECTION, SOLUTION INTRAVENOUS at 10:43:00

## 2018-01-01 RX ADMIN — 0.9 % SODIUM CHLORIDE 10 ML: 0.9 % VIAL (ML) INJECTION at 13:04:00

## 2018-01-01 RX ADMIN — PHENYLEPHRINE HCL 100 MCG: 10 MG/ML VIAL (ML) INJECTION at 08:10:00

## 2018-01-01 RX ADMIN — LIDOCAINE HYDROCHLORIDE 25 MG: 10 INJECTION, SOLUTION EPIDURAL; INFILTRATION; INTRACAUDAL; PERINEURAL at 07:46:00

## 2018-01-01 RX ADMIN — CYANOCOBALAMIN 1000 MCG: 1000 INJECTION INTRAMUSCULAR; SUBCUTANEOUS at 14:46:00

## 2018-01-01 RX ADMIN — LIDOCAINE HYDROCHLORIDE 50 MG: 10 INJECTION, SOLUTION EPIDURAL; INFILTRATION; INTRACAUDAL; PERINEURAL at 10:45:00

## 2018-01-05 ENCOUNTER — TELEPHONE (OUTPATIENT)
Dept: PULMONOLOGY | Facility: CLINIC | Age: 83
End: 2018-01-05

## 2018-01-05 DIAGNOSIS — G47.33 OSA (OBSTRUCTIVE SLEEP APNEA): ICD-10-CM

## 2018-01-05 DIAGNOSIS — R06.00 DYSPNEA, UNSPECIFIED TYPE: Primary | ICD-10-CM

## 2018-01-05 NOTE — TELEPHONE ENCOUNTER
Pt states that she needs to get an order to get chest x-ray 24 hours before getting the Lung x-ray done. Pt. States that she has her lung xray sched for 1/10/18.

## 2018-01-05 NOTE — TELEPHONE ENCOUNTER
Spoke to Pt who sttd that she was told that the titration was in open status. Pt notified that the titration did not require a PA and it has an approval status per managed care. Pt stts she will schedule the titration soon.

## 2018-01-05 NOTE — TELEPHONE ENCOUNTER
Pt. states that she is not able to get cpap titration study completed as information is still needed. Pt. States that the Titration study is in open status.

## 2018-01-09 ENCOUNTER — HOSPITAL ENCOUNTER (OUTPATIENT)
Dept: GENERAL RADIOLOGY | Facility: HOSPITAL | Age: 83
Discharge: HOME | End: 2018-01-09
Attending: INTERNAL MEDICINE
Payer: MEDICARE

## 2018-01-09 DIAGNOSIS — G47.33 OSA (OBSTRUCTIVE SLEEP APNEA): ICD-10-CM

## 2018-01-09 DIAGNOSIS — R06.00 DYSPNEA, UNSPECIFIED TYPE: ICD-10-CM

## 2018-01-09 PROCEDURE — 71046 X-RAY EXAM CHEST 2 VIEWS: CPT | Performed by: INTERNAL MEDICINE

## 2018-01-10 ENCOUNTER — HOSPITAL ENCOUNTER (OUTPATIENT)
Dept: NUCLEAR MEDICINE | Facility: HOSPITAL | Age: 83
Discharge: HOME | End: 2018-01-10
Attending: INTERNAL MEDICINE
Payer: MEDICARE

## 2018-01-10 ENCOUNTER — HOSPITAL ENCOUNTER (EMERGENCY)
Facility: HOSPITAL | Age: 83
Discharge: HOME | End: 2018-01-10
Attending: EMERGENCY MEDICINE
Payer: MEDICARE

## 2018-01-10 VITALS
BODY MASS INDEX: 32.54 KG/M2 | RESPIRATION RATE: 16 BRPM | WEIGHT: 155 LBS | HEIGHT: 58 IN | SYSTOLIC BLOOD PRESSURE: 178 MMHG | OXYGEN SATURATION: 95 % | TEMPERATURE: 98 F | DIASTOLIC BLOOD PRESSURE: 94 MMHG | HEART RATE: 90 BPM

## 2018-01-10 DIAGNOSIS — R04.0 EPISTAXIS: Primary | ICD-10-CM

## 2018-01-10 DIAGNOSIS — R06.00 DYSPNEA, UNSPECIFIED TYPE: ICD-10-CM

## 2018-01-10 LAB
ANION GAP SERPL CALC-SCNC: 9 MMOL/L (ref 0–18)
BASOPHILS # BLD: 0.1 K/UL (ref 0–0.2)
BASOPHILS NFR BLD: 1 %
BUN SERPL-MCNC: 18 MG/DL (ref 8–20)
BUN/CREAT SERPL: 24.3 (ref 10–20)
CALCIUM SERPL-MCNC: 9.5 MG/DL (ref 8.5–10.5)
CHLORIDE SERPL-SCNC: 102 MMOL/L (ref 95–110)
CO2 SERPL-SCNC: 27 MMOL/L (ref 22–32)
CREAT SERPL-MCNC: 0.74 MG/DL (ref 0.5–1.5)
EOSINOPHIL # BLD: 0.2 K/UL (ref 0–0.7)
EOSINOPHIL NFR BLD: 3 %
ERYTHROCYTE [DISTWIDTH] IN BLOOD BY AUTOMATED COUNT: 14.1 % (ref 11–15)
GLUCOSE SERPL-MCNC: 109 MG/DL (ref 70–99)
HCT VFR BLD AUTO: 42.5 % (ref 35–48)
HGB BLD-MCNC: 14.2 G/DL (ref 12–16)
LYMPHOCYTES # BLD: 1.7 K/UL (ref 1–4)
LYMPHOCYTES NFR BLD: 21 %
MCH RBC QN AUTO: 30.6 PG (ref 27–32)
MCHC RBC AUTO-ENTMCNC: 33.5 G/DL (ref 32–37)
MCV RBC AUTO: 91.4 FL (ref 80–100)
MONOCYTES # BLD: 0.6 K/UL (ref 0–1)
MONOCYTES NFR BLD: 8 %
NEUTROPHILS # BLD AUTO: 5.4 K/UL (ref 1.8–7.7)
NEUTROPHILS NFR BLD: 68 %
OSMOLALITY UR CALC.SUM OF ELEC: 288 MOSM/KG (ref 275–295)
PLATELET # BLD AUTO: 405 K/UL (ref 140–400)
PMV BLD AUTO: 7.5 FL (ref 7.4–10.3)
POTASSIUM SERPL-SCNC: 3.8 MMOL/L (ref 3.3–5.1)
RBC # BLD AUTO: 4.65 M/UL (ref 3.7–5.4)
SODIUM SERPL-SCNC: 138 MMOL/L (ref 136–144)
WBC # BLD AUTO: 7.9 K/UL (ref 4–11)

## 2018-01-10 PROCEDURE — 99283 EMERGENCY DEPT VISIT LOW MDM: CPT

## 2018-01-10 PROCEDURE — 85025 COMPLETE CBC W/AUTO DIFF WBC: CPT | Performed by: EMERGENCY MEDICINE

## 2018-01-10 PROCEDURE — 78582 LUNG VENTILAT&PERFUS IMAGING: CPT | Performed by: INTERNAL MEDICINE

## 2018-01-10 PROCEDURE — 80048 BASIC METABOLIC PNL TOTAL CA: CPT | Performed by: EMERGENCY MEDICINE

## 2018-01-10 PROCEDURE — 36415 COLL VENOUS BLD VENIPUNCTURE: CPT

## 2018-01-10 PROCEDURE — 30901 CONTROL OF NOSEBLEED: CPT

## 2018-01-10 NOTE — ED NOTES
PT safe to DC home per MD. Akiko Given to dress self. DC teaching done, pt verbalizes understanding. Ambulatory with steady gait to exit.

## 2018-01-10 NOTE — ED PROVIDER NOTES
Patient Seen in: [facility] AND [facility] Emergency Department    History   Patient presents with:  Nose Bleed (nasopharyngeal): Exerted herself in washroom. Started bleeding. Patient is on blood thinners. Stated Complaint: Bloody Nose. On blood thinners. No abNl paps. Uterine cancer and S/P chemo. Smoking status: Never Smoker                                                              Smokeless tobacco: Never Used                      Alcohol use:  No                Review of Systems Abnormal; Notable for the following:      (*)     All other components within normal limits   CBC WITH DIFFERENTIAL WITH PLATELET    Narrative: The following orders were created for panel order CBC WITH DIFFERENTIAL WITH PLATELET.   Procedure

## 2018-01-18 ENCOUNTER — OFFICE VISIT (OUTPATIENT)
Dept: OTOLARYNGOLOGY | Facility: CLINIC | Age: 83
End: 2018-01-18

## 2018-01-18 VITALS
HEIGHT: 58.5 IN | TEMPERATURE: 98 F | DIASTOLIC BLOOD PRESSURE: 91 MMHG | SYSTOLIC BLOOD PRESSURE: 157 MMHG | BODY MASS INDEX: 31.67 KG/M2 | WEIGHT: 155 LBS

## 2018-01-18 DIAGNOSIS — J38.3 VOCAL CORD DYSFUNCTION: ICD-10-CM

## 2018-01-18 DIAGNOSIS — R04.0 EPISTAXIS: Primary | ICD-10-CM

## 2018-01-18 PROCEDURE — G0463 HOSPITAL OUTPT CLINIC VISIT: HCPCS | Performed by: OTOLARYNGOLOGY

## 2018-01-18 PROCEDURE — 99213 OFFICE O/P EST LOW 20 MIN: CPT | Performed by: OTOLARYNGOLOGY

## 2018-01-18 NOTE — PROGRESS NOTES
Lu Moritz is a 81year old female. Patient presents with:  Nose Problem: pt was seen in the ER on 1-10-18 regarding nosebleed from left nostril, pt nostril was cauterized in ER     HPI:   She had an episode of epistaxis 8 days ago.  Cauterized in t High blood pressure    • High cholesterol    • Hypothyroid    • Osteoarthritis    • Osteopenia     S/P reclast in [place].     • Other and unspecified hyperlipidemia    • Personal history of antineoplastic chemotherapy     40 YRS AGO   • Unspecified essent Normal. TM - Right: Normal, Left: Normal.     ASSESSMENT AND PLAN:   1. Epistaxis  Following her episode of epistaxis. I recommended moisture measures and that she return for any problems.     2. Vocal cord dysfunction  She has some vocal cord bowing and C

## 2018-01-20 ENCOUNTER — HOSPITAL ENCOUNTER (EMERGENCY)
Facility: HOSPITAL | Age: 83
Discharge: HOME | End: 2018-01-20
Attending: EMERGENCY MEDICINE
Payer: MEDICARE

## 2018-01-20 VITALS
RESPIRATION RATE: 20 BRPM | OXYGEN SATURATION: 94 % | HEART RATE: 102 BPM | SYSTOLIC BLOOD PRESSURE: 162 MMHG | DIASTOLIC BLOOD PRESSURE: 95 MMHG | TEMPERATURE: 97 F

## 2018-01-20 DIAGNOSIS — R04.0 EPISTAXIS: Primary | ICD-10-CM

## 2018-01-20 PROCEDURE — 99283 EMERGENCY DEPT VISIT LOW MDM: CPT

## 2018-01-20 PROCEDURE — 30901 CONTROL OF NOSEBLEED: CPT

## 2018-01-20 NOTE — ED PROVIDER NOTES
Patient Seen in: [facility] AND [facility] Emergency Department    History   Patient presents with:  Nose Bleed (nasopharyngeal): controlled    Stated Complaint:     HPI    15-year-old female with history of atrial fibrillation for which she takes Xarelto along TONSILLECTOMY  No date: TOTAL ABDOM HYSTERECTOMY      Comment: MUNIR and BSO. Due to fibroids. No abNl paps. Uterine cancer and S/P chemo.          Smoking status: Never Smoker                                                              Smokel myself.             Disposition and Plan     Clinical Impression:  Epistaxis  (primary encounter diagnosis)    Disposition:  Discharge  1/20/2018 10:20 am    Follow-up:  Malka Sosa MD  [address] [place] [id number]  [address] [place] [address]

## 2018-01-20 NOTE — ED INITIAL ASSESSMENT (HPI)
Patient presents to ED with complaint of nose bleed since 0530 today. Patient has nose clip on nose - bleeding controlled.

## 2018-01-26 ENCOUNTER — TELEPHONE (OUTPATIENT)
Dept: PULMONOLOGY | Facility: CLINIC | Age: 83
End: 2018-01-26

## 2018-01-26 NOTE — TELEPHONE ENCOUNTER
Pt indicates she has copd and is requesting orders for handicap plague, pls call at:[phone],thanks.

## 2018-01-29 NOTE — TELEPHONE ENCOUNTER
Per Dr. Ely Orozco pt does not qualify for parking placard from a pulmonary perspective, pt does not use portable oxygen and FEV1 is not less than 1. Pt notified of this. Pt states she will ask her cardiologist to complete.

## 2018-01-31 ENCOUNTER — TELEPHONE (OUTPATIENT)
Dept: PULMONOLOGY | Facility: CLINIC | Age: 83
End: 2018-01-31

## 2018-01-31 NOTE — TELEPHONE ENCOUNTER
----- Message from Pam Stearns MD sent at 1/30/2018  4:47 PM CST -----  Please make sure that the patient is aware about her VQ scan result is normal

## 2018-02-06 ENCOUNTER — OFFICE VISIT (OUTPATIENT)
Dept: CARDIOLOGY CLINIC | Facility: HOSPITAL | Age: 83
End: 2018-02-06
Attending: INTERNAL MEDICINE
Payer: MEDICARE

## 2018-02-06 ENCOUNTER — APPOINTMENT (OUTPATIENT)
Dept: SLEEP CENTER | Age: 83
End: 2018-02-06
Attending: INTERNAL MEDICINE
Payer: MEDICARE

## 2018-02-06 VITALS
OXYGEN SATURATION: 95 % | BODY MASS INDEX: 32 KG/M2 | WEIGHT: 155.88 LBS | SYSTOLIC BLOOD PRESSURE: 177 MMHG | HEART RATE: 95 BPM | DIASTOLIC BLOOD PRESSURE: 93 MMHG

## 2018-02-06 DIAGNOSIS — I10 ESSENTIAL HYPERTENSION: ICD-10-CM

## 2018-02-06 DIAGNOSIS — I48.0 PAF (PAROXYSMAL ATRIAL FIBRILLATION) (HCC): ICD-10-CM

## 2018-02-06 DIAGNOSIS — I48.0 PAROXYSMAL ATRIAL FIBRILLATION (HCC): ICD-10-CM

## 2018-02-06 DIAGNOSIS — I10 HTN (HYPERTENSION), BENIGN: ICD-10-CM

## 2018-02-06 DIAGNOSIS — J44.9 CHRONIC OBSTRUCTIVE PULMONARY DISEASE, UNSPECIFIED COPD TYPE (HCC): ICD-10-CM

## 2018-02-06 DIAGNOSIS — G47.33 OBSTRUCTIVE SLEEP APNEA SYNDROME: ICD-10-CM

## 2018-02-06 DIAGNOSIS — R06.00 DOE (DYSPNEA ON EXERTION): ICD-10-CM

## 2018-02-06 DIAGNOSIS — I50.9 HEART FAILURE, UNSPECIFIED (HCC): Primary | ICD-10-CM

## 2018-02-06 LAB
ANION GAP SERPL CALC-SCNC: 10 MMOL/L (ref 0–18)
BNP SERPL-MCNC: 19 PG/ML (ref 0–100)
BUN SERPL-MCNC: 20 MG/DL (ref 8–20)
BUN/CREAT SERPL: 22.7 (ref 10–20)
CALCIUM SERPL-MCNC: 9.4 MG/DL (ref 8.5–10.5)
CHLORIDE SERPL-SCNC: 101 MMOL/L (ref 95–110)
CO2 SERPL-SCNC: 26 MMOL/L (ref 22–32)
CREAT SERPL-MCNC: 0.88 MG/DL (ref 0.5–1.5)
GLUCOSE SERPL-MCNC: 92 MG/DL (ref 70–99)
OSMOLALITY UR CALC.SUM OF ELEC: 286 MOSM/KG (ref 275–295)
POTASSIUM SERPL-SCNC: 4.1 MMOL/L (ref 3.3–5.1)
SODIUM SERPL-SCNC: 137 MMOL/L (ref 136–144)

## 2018-02-06 PROCEDURE — 80048 BASIC METABOLIC PNL TOTAL CA: CPT | Performed by: NURSE PRACTITIONER

## 2018-02-06 PROCEDURE — 93005 ELECTROCARDIOGRAM TRACING: CPT

## 2018-02-06 PROCEDURE — 99211 OFF/OP EST MAY X REQ PHY/QHP: CPT | Performed by: NURSE PRACTITIONER

## 2018-02-06 PROCEDURE — 83880 ASSAY OF NATRIURETIC PEPTIDE: CPT | Performed by: NURSE PRACTITIONER

## 2018-02-06 PROCEDURE — 93010 ELECTROCARDIOGRAM REPORT: CPT | Performed by: NURSE PRACTITIONER

## 2018-02-06 PROCEDURE — 36415 COLL VENOUS BLD VENIPUNCTURE: CPT | Performed by: NURSE PRACTITIONER

## 2018-02-06 PROCEDURE — 99214 OFFICE O/P EST MOD 30 MIN: CPT | Performed by: NURSE PRACTITIONER

## 2018-02-06 RX ORDER — LISINOPRIL AND HYDROCHLOROTHIAZIDE 20; 12.5 MG/1; MG/1
2 TABLET ORAL DAILY
Qty: 60 TABLET | Refills: 1 | Status: SHIPPED | OUTPATIENT
Start: 2018-02-06 | End: 2018-01-01

## 2018-02-06 NOTE — PATIENT INSTRUCTIONS
increase lisinopril/HCT 20–12.5 mg take 2 tablets daily, take one tablet when you get home    Continue all your same medications    Call if having any dizziness, lightheadedness, heart racing, palpitations, chest pain, shortness of breath, coughing, swelli

## 2018-02-13 ENCOUNTER — APPOINTMENT (OUTPATIENT)
Dept: LAB | Facility: HOSPITAL | Age: 83
End: 2018-02-13
Attending: NURSE PRACTITIONER
Payer: MEDICARE

## 2018-02-13 DIAGNOSIS — I10 HTN (HYPERTENSION), BENIGN: ICD-10-CM

## 2018-02-13 DIAGNOSIS — I50.9 HEART FAILURE, UNSPECIFIED (HCC): ICD-10-CM

## 2018-02-13 LAB
ANION GAP SERPL CALC-SCNC: 15 MMOL/L (ref 0–18)
BUN SERPL-MCNC: 19 MG/DL (ref 8–20)
BUN/CREAT SERPL: 17.9 (ref 10–20)
CALCIUM SERPL-MCNC: 9.9 MG/DL (ref 8.5–10.5)
CHLORIDE SERPL-SCNC: 95 MMOL/L (ref 95–110)
CO2 SERPL-SCNC: 27 MMOL/L (ref 22–32)
CREAT SERPL-MCNC: 1.06 MG/DL (ref 0.5–1.5)
GLUCOSE SERPL-MCNC: 96 MG/DL (ref 70–99)
OSMOLALITY UR CALC.SUM OF ELEC: 286 MOSM/KG (ref 275–295)
POTASSIUM SERPL-SCNC: 3.8 MMOL/L (ref 3.3–5.1)
SODIUM SERPL-SCNC: 137 MMOL/L (ref 136–144)

## 2018-02-13 PROCEDURE — 80048 BASIC METABOLIC PNL TOTAL CA: CPT

## 2018-02-13 PROCEDURE — 36415 COLL VENOUS BLD VENIPUNCTURE: CPT

## 2018-02-26 ENCOUNTER — OFFICE VISIT (OUTPATIENT)
Dept: SLEEP CENTER | Age: 83
End: 2018-02-26
Attending: INTERNAL MEDICINE
Payer: MEDICARE

## 2018-02-26 DIAGNOSIS — Z76.89 SLEEP CONCERN: Primary | ICD-10-CM

## 2018-02-26 PROCEDURE — 95811 POLYSOM 6/>YRS CPAP 4/> PARM: CPT

## 2018-02-28 NOTE — PROCEDURES
[facility]  Accredited by the [facility] of Sleep Medicine (AASM)    PATIENT'S NAME: Pancho Castillo   ATTENDING PHYSICIAN: Tate Null. Estephanie García MD   REFERRING PHYSICIAN: Tate Null.  Estephanie García MD   PATIENT ACCOUNT #: [id number] LOCATION: S is 1.6 events per hour, and the spontaneous arousal index is 4.5 events per hour, for a combined arousal index of 6.1 events per hour. There were no significant periodic limb movements. The lowest desaturation was to 87%.   The average heart rate was 79 b

## 2018-03-07 ENCOUNTER — PATIENT OUTREACH (OUTPATIENT)
Dept: INTERNAL MEDICINE CLINIC | Facility: CLINIC | Age: 83
End: 2018-03-07

## 2018-03-28 NOTE — PROGRESS NOTES
HPI:    Patient ID: Kiara Lindo is a 81year old female.     HPI  Breathing much improved after she adjusted her blood pressure medication  Mild daytime sleepiness and fatigue  No cough  No wheezes  No chest pain  No shortness of breath at this point Exam reveals no gallop. No murmur heard. Pulmonary/Chest: Breath sounds normal. She has no wheezes. She has no rales. Musculoskeletal: She exhibits no edema. Neurological: She is oriented to person, place, and time.               ASSESSMENT/PLAN:

## 2018-03-30 NOTE — TELEPHONE ENCOUNTER
Patient does not need a prior authorization just a referral from [facility] which has been completed and sent to patient and HME.

## 2018-03-30 NOTE — TELEPHONE ENCOUNTER
Informed pt the information below. Pt verbalized she just wanted to double check.  Verbalized understanding

## 2018-04-16 NOTE — TELEPHONE ENCOUNTER
Pt states she lost her voice, has appt tomorrow with , requesting to speak to RN re: recommendations until appt tomorrow. Pls call thank you.

## 2018-04-16 NOTE — TELEPHONE ENCOUNTER
Pt has appt scheduled 4/17/18 for voice issues; pt has lost her voice and she would like to know if there is anything she may do for this until appt tmrow. Dr. Garret Rodney, please advise.

## 2018-04-16 NOTE — TELEPHONE ENCOUNTER
Pt informed per  he does not know what to advise until he sees her. Pt verbalized understanding; she will continue pushing fluids and rest her voice until her visit w/ .

## 2018-04-17 NOTE — PROGRESS NOTES
Rommel Leon is a 81year old female. Patient presents with:  Voice Problem: loss of voice since saturday     HPI:   A few days ago she suddenly lost her voice. She has no pain and denies any recent URI symptoms.   Voice has improved slightly    Curr reclast in [place].     • Other and unspecified hyperlipidemia    • Personal history of antineoplastic chemotherapy     40 YRS AGO   • Unspecified essential hypertension    • Uterine cancer ([facility]       Social History:  Smoking status: Never Smoker Correct patient identified. Correct side and site confirmed. Laryngoscopy:  Flexible Fiberoptic Laryngoscopy: A diagnostic flexible fiberoptic laryngoscopy was performed.  The flexible fiberoptic laryngoscope was placed into the mouth and advanced

## 2018-05-07 PROBLEM — E11.9 DIABETES MELLITUS WITHOUT COMPLICATION (HCC): Status: ACTIVE | Noted: 2018-01-01

## 2018-05-07 PROBLEM — D75.839 THROMBOCYTOSIS: Status: ACTIVE | Noted: 2018-01-01

## 2018-05-07 PROBLEM — I73.9 PVD (PERIPHERAL VASCULAR DISEASE) (HCC): Status: ACTIVE | Noted: 2018-01-01

## 2018-05-07 PROBLEM — M47.816 ARTHRITIS, LUMBAR SPINE: Status: ACTIVE | Noted: 2018-01-01

## 2018-05-07 NOTE — PROGRESS NOTES
HPI:    Patient ID: Sandrita Bucio is a 81year old female. Hoarse voice, more qhs. On bipap since 4-18. Mild sleep apnea. Saw Dr. Moses Bach ENT for epistaxis. On xarelto. Humidifier. Eye exam 2 yrs. Ago. Saw Dr. Argueta SentRavel Law cards. 2-18. 60 capsule Rfl: 2      Allergies:   Aspirin                 RASH   Past Medical History:   Diagnosis Date   • A-fib ([address] 1-17    With RVR. Converted after stress test. Dr. Benigno Keys cardiology. • Abnormal stress echo 1-4-17    +For ischemia. lexiscan is Nl. Smokeless tobacco: Never Used                        Alcohol use:  No              Drug use: No            Other Topics            Concern   Service        No  Blood Transfusions      Yes  Caffeine Concern        No  Occupational Exposure   No 06/12/2017 11:41 AM   TSH 5.10 06/12/2017 11:41 AM          Lab Results  Component Value Date/Time   CHOLEST 187 12/07/2017 07:01 AM   HDL 85 12/07/2017 07:01 AM   TRIG 116 12/07/2017 07:01 AM   LDL 79 12/07/2017 07:01 AM   NONHDLC 102 12/07/2017 07:01 AM discharge, redness, itching and visual disturbance. Respiratory: Negative for apnea, cough, choking, chest tightness, shortness of breath, wheezing and stridor. Cardiovascular: Negative for chest pain, palpitations and leg swelling.    Gastrointestinal mastoid tenderness. Tympanic membrane is not injected, not scarred, not perforated, not erythematous, not retracted and not bulging. Tympanic membrane mobility is normal. No middle ear effusion. No hemotympanum. No decreased hearing is noted.    Left Ear: T exhibits normal extraocular motion and no nystagmus. Neck: Trachea normal and phonation normal. Neck supple. Normal carotid pulses, no hepatojugular reflux and no JVD present. No tracheal tenderness present. Carotid bruit is not present.  No tracheal yoel supraclavicular adenopathy present. Left: No supraclavicular adenopathy present. Neurological: She is alert and oriented to person, place, and time. Skin: Skin is warm and dry. No rash noted. She is not diaphoretic. No erythema. No pallor.    Psy May need swallow eval.     No motrin, ibuprofen, advil, alleve, naprosyn  with these medications 1 week prior to surgery. All rest of meds. Take with sips of water, even on AM of surgery. Hold fish oil tabs. Shingrix vaccine info.        Orders Plac 7/7/2018), or if symptoms worsen or fail to improve. This consult was sent back the referring physician, Dr. Abby Copeland.         IR#[id number]

## 2018-05-07 NOTE — PATIENT INSTRUCTIONS
ASSESSMENT/PLAN:   Essential hypertension  (primary encounter diagnosis) Good control. Careful with diet and excercise at least 30 minutes 3-4 times a week. Check blood pressures at different times on different days.  Can purchase own blood pressure monitor [12863]      Hemoglobin A1C [E]      Microalb/Creat Ratio, Random Urine [E]      TSH W Reflex To Free T4 [E]      MRSA Culture Only [E]    Meds This Visit:  Signed Prescriptions Disp Refills    Montelukast Sodium (SINGULAIR) 10 MG Oral Tab 30 tablet 0

## 2018-05-10 NOTE — TELEPHONE ENCOUNTER
Yes even on AM of surgery. Never said in my note to stop xarelto. Only asked if we can get hold of Dr. Isaac Amel office about xarelto.

## 2018-05-11 NOTE — TELEPHONE ENCOUNTER
Spoke to pt, she understand she can  continue to take Thyroid medication,   Also I called Dr. Betito Strickland office spoke to April she stated she will fax nurse note over,   Regarding Xarelto medication. Notified pt   When I receive will place on your desk.

## 2018-05-11 NOTE — TELEPHONE ENCOUNTER
Received info on pt medication Xarelto. Notified pt can stop the medication 5 days prior to surgery.

## 2018-05-15 NOTE — PAT NURSING NOTE
Per note of  in the CBC result, she's recommending for pt.to see a hematologist prior to [facility] from dr. Narciso Alvarado office was made aware.

## 2018-05-17 PROBLEM — R79.1 ABNORMAL COAGULATION PROFILE: Status: ACTIVE | Noted: 2018-01-01

## 2018-05-17 NOTE — TELEPHONE ENCOUNTER
Pt stated made appt with Dr. Ainsley Goodwin , Hematology for upcoming Monday. Pt ask can you place order.

## 2018-05-21 NOTE — PROGRESS NOTES
Hematology Consultation Note    Patient Name: Rommel Leon   YOB: 1935   Medical Record Number: [id number]   CSN: [id number]   Consulting Physician: Christopher Gillis MD  Referring Physician(s): Wili Munoz  Date of Consultation: 5/21/ cholesterol    • History of blood transfusion    • Hypothyroid    • Osteoarthritis    • Osteopenia     S/P reclast in [place].     • Other and unspecified hyperlipidemia    • Personal history of antineoplastic chemotherapy     40 YRS AGO   • PONV (postoper Stress Concern No    Weight Concern Yes    Special Diet No    Back Care No    Exercise Yes    Bike Helmet No    Seat Belt Yes    Self-Exams Yes     Social History Narrative    Andrei Garg is a  for the last 15 yrs.   smoked around pt but for about 20 chills, fatigue, fevers, night sweats and weight loss. Eyes: Negative for visual disturbance, irritation and redness. Respiratory: Negative for cough, hemoptysis, chest pain, +dyspnea on exertion.   Gastrointestinal: Negative for dysphagia, odynophagia, r 06:42 AM   HCT 39.8 05/12/2018 06:42 AM   MCV 91.9 05/12/2018 06:42 AM   MCH 30.5 05/12/2018 06:42 AM   MCHC 33.2 05/12/2018 06:42 AM   RDW 14.5 05/12/2018 06:42 AM   NEPRELIM 5.31 04/25/2016 07:18 AM    (H) 05/12/2018 06:42 AM         Lab Results Thrombocytosis --secondary to likely inflammation of left knee osteoarthritis    --Patient has a mild thrombocytosis at 430K with normal WBC, differential, hemoglobin/hematocrit  --Suspect this is a mild reactive thrombocytosis; no clinical concern for und
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
## 2018-05-24 PROBLEM — Z01.818 PREOPERATIVE TESTING: Status: ACTIVE | Noted: 2018-01-01

## 2018-05-24 NOTE — PHYSICAL THERAPY NOTE
PHYSICAL THERAPY KNEE EVALUATION - INPATIENT       Room Number: 416/416-A  Evaluation Date: 5/24/2018  Type of Evaluation: Initial  Physician Order: PT Eval and Treat    Presenting Problem: s/p L TKA  Reason for Therapy: Mobility Dysfunction and Discharge Body mechanics; Bed mobility; Endurance; Energy conservation;Patient education; Family education;Gait training;Strengthening;Transfer training  Rehab Potential : Good  Frequency (Obs): BID       PHYSICAL THERAPY MEDICAL/SOCIAL HISTORY      Problem List  Active One level; Other (Comment) (with elevator)  Stairs to Enter : 0     Stairs to Bedroom: 0       Lives With: Daughter  Drives: Yes  Patient Owned Equipment: Rolling walker;Cane  Patient Regularly Uses: Glasses    Prior Level of [place]: Patient lives wit have...  -   Turning over in bed (including adjusting bedclothes, sheets and blankets)?: A Little   -   Sitting down on and standing up from a chair with arms (e.g., wheelchair, bedside commode, etc.): A Little   -   Moving from lying on back to sitting on 300 feet with assistive device at assistance level: modified independent    Goal #3   Current Status    Goal #4 Patient independently performs home exercise program for ROM/strengthening per the instructions provided in preparation for discharge.    Goal #4

## 2018-05-24 NOTE — ANESTHESIA PREPROCEDURE EVALUATION
Anesthesia PreOp Note    HPI:     Dorys Moreno is a 81year old female who presents for preoperative consultation requested by: Randi Max MD    Date of Surgery: 5/24/2018    Procedure(s):  KNEE TOTAL REPLACEMENT  Indication: Left knee osteoar D deficiency         Date Noted: 11/11/2016      Osteopenia        Past Medical History:   Diagnosis Date   • A-fib ([address] 1-17    With RVR. Converted after stress test. Dr. Sabino Cobb cardiology. • Abnormal stress echo 1-4-17    +For ischemia. lexiscan is Nl. Sodium (SINGULAIR) 10 MG Oral Tab Take 1 tablet (10 mg total) by mouth nightly. Disp: 30 tablet Rfl: 0 5/23/2018 at 2100   Fluticasone Propionate 50 MCG/ACT Nasal Suspension by Each Nare route daily.  Disp:  Rfl:  5/24/2018 at 1908 Yadiel Brewster Intravenous Once Luis Karimi MD    clonidine/epinephrine/ropivacaine/ketorolac (CERTS) pain cocktail  Intra-articular Once Luis Karimi MD      No current [facility]-ordered outpatient prescriptions on file.       Aspirin                 RASH    Family 109 (H) 05/12/2018   CA 9.5 05/12/2018       Lab Results  Component Value Date   INR 1.6 (H) 05/12/2018       Vital Signs: Body mass index is 29.68 kg/m². height is 1.473 m (4' 10\") and weight is 64.4 kg (142 lb).  Her oral temperature is 97.6 °F (36.4

## 2018-05-24 NOTE — ANESTHESIA POSTPROCEDURE EVALUATION
Patient: Ayde Geotz    Procedure Summary     Date:  05/24/18 Room / Location:  [facility] OR  / [facility] OR    Anesthesia Start:  2613 Anesthesia Stop:      Procedure:  KNEE TOTAL REPLACEMENT (Left ) Diagnosis:  (Left knee osteoarthritis )    Surge

## 2018-05-24 NOTE — BRIEF OP NOTE
Pre-Operative Diagnosis: Left knee osteoarthritis      Post-Operative Diagnosis: Same     Procedure Performed: Left total knee arthroplasty with MRI templated Patient Specific Instruments    Surgeon(s) and Role: Michelle Jaquez MD - Primary    Assistant(

## 2018-05-24 NOTE — INTERVAL H&P NOTE
Pre-op Diagnosis: Left knee osteoarthritis     The above referenced H&P was reviewed by Chrissy Arreguin MD on 5/24/2018, the patient was examined and no significant changes have occurred in the patient's condition since the H&P was performed.   I discussed

## 2018-05-24 NOTE — RESPIRATORY THERAPY NOTE
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 Patient does routinely use a CPAP device at home.      CPAP INITIATION:    Patient to be placed on CPAP: No  Patient refused: Yes    RECOMENDATIONS:    RCP recommends to monitor patient overn

## 2018-05-24 NOTE — OPERATIVE REPORT
[facility]    PATIENT'S NAME: [facility]   ATTENDING PHYSICIAN: Tevin Zavala MD   OPERATING PHYSICIAN: Ashley Zavala MD   PATIENT ACCOUNT#:   [id number]    LOCATION:  [facility] PACU 7 [facility] 10  MEDICAL RECORD #:   [id number] and tourniquet inflated to 300 mmHg. Tourniquet time for the case was 69 minutes. An incision was made and a midvastus snip was used to gain access to the left knee. Some of the fat pad and anterior menisci were excised.   The patella was resected from of proper consistency, the tibia, followed by the femur, followed by the patella were cemented. One of the assistants held axial pressure with a 12 mm spacer in place while the cement hardened. Components used were the [employer]   The femur was a

## 2018-05-24 NOTE — ANESTHESIA PROCEDURE NOTES
Spinal Block  Performed by: Eloisa Lozano  Authorized by: Eloisa Lozano     Start Time:  5/24/2018 7:35 AM  End Time:  5/24/2018 7:50 AM  Site identification: surface landmarks    Reason for Block: post-op pain management and surgical anesthesia    A

## 2018-05-24 NOTE — CM/SW NOTE
SW met with pt at bedside to discuss discharge planning. Pt lives in [facility] with an elevator. Pt lives with her daughter who will be available to assist pt upon discharge but will begin working full time on Tuesday.  Prior to this admission pt was independen

## 2018-05-25 PROBLEM — Z96.652 STATUS POST LEFT KNEE REPLACEMENT: Status: ACTIVE | Noted: 2018-01-01

## 2018-05-25 PROBLEM — M17.12 PRIMARY OSTEOARTHRITIS OF LEFT KNEE: Status: ACTIVE | Noted: 2018-01-01

## 2018-05-25 PROBLEM — I95.9 HYPOTENSIVE EPISODE: Status: ACTIVE | Noted: 2018-01-01

## 2018-05-25 PROBLEM — R13.19 OTHER DYSPHAGIA: Status: ACTIVE | Noted: 2018-01-01

## 2018-05-25 PROBLEM — D72.829 ELEVATED WBCS: Status: ACTIVE | Noted: 2018-01-01

## 2018-05-25 NOTE — HOME CARE LIAISON
Received referral from [address]. Met with patient at the bedside. Patient is agreeable to [facility], pending orders. Brochure and liaison's business card provided with contact information. All questions addressed and answered.

## 2018-05-25 NOTE — ANESTHESIA POST-OP FOLLOW-UP NOTE
POD 1 spinal duramorph. Doing well. Still pain free. No other anesthesia followup needed at this time,.

## 2018-05-25 NOTE — OCCUPATIONAL THERAPY NOTE
OCCUPATIONAL THERAPY EVALUATION - INPATIENT     Room Number: 416/416-A  Evaluation Date: 5/25/2018  Type of Evaluation: Initial  Presenting Problem: L tkr    Physician Order: IP Consult to Occupational Therapy  Reason for Therapy: ADL/IADL Dysfunction an 1-4-17    +For ischemia. Mary Jane Rich is Nl. 1-17: Echo shows NL LVEF but LVH.     • Arrhythmia     A FIB   • Cataract    • COPD (chronic obstructive pulmonary disease) (HCC)     using bipap at night   • Disorder of thyroid     THYROIDECTOMY   • Dizziness 12-16 driving and mobility    SUBJECTIVE  \"I remember from my hip surgery\"    OCCUPATIONAL THERAPY EXAMINATION      OBJECTIVE  Precautions: Limb alert - left  Fall Risk: Standard fall risk    WEIGHT BEARING RESTRICTION  L Lower Extremity: Weight Bearing as Tae

## 2018-05-25 NOTE — PROGRESS NOTES
[facility]    Progress Note    Viri Finger Patient Status:  Inpatient    1935 MRN [id number]   Location [facility] 4W/SW/SE Attending Leanna Conley MD   [facility] Day # 1 PCP Filemon Tran.  Jared Gudino MD     Subjective:     Co

## 2018-05-25 NOTE — H&P
[facility]    History and Physical    Del[facility] Patient Status:  Inpatient    1935 MRN [id number]   Location [facility] 4W/SW/SE Attending Sami Junior MD   [facility] Day # 1 PCP Jayme Castleman.  Mike Myers MD     Date:   Uterine cancer and S/P chemo.    No date: TOTAL HIP REPLACEMENT  Family History   Problem Relation Age of Onset   • Bipolar Disorder Daughter    • Cancer Daughter      Multiple Myeloma;  in    • Melanoma Brother    • Other Charm Vermillion Sister    • Coloni Negative. Physical Exam:   Vital Signs:  Blood pressure 97/50, pulse 93, temperature 97.8 °F (36.6 °C), temperature source Oral, resp. rate 16, height 4' 10\" (1.473 m), weight 142 lb (64.4 kg), SpO2 94 %, not currently breastfeeding.     Nursing not A-fib ([address]- rate controlled, on xarelto        Chronic obstructive pulmonary disease ([address]- stable, no sob        Diabetes mellitus without complication ([address]- will monitor BS        Hypotensive episode- resolved with IVF, will monitor       Elevated WBCs

## 2018-05-25 NOTE — PHYSICAL THERAPY NOTE
PHYSICAL THERAPY KNEE TREATMENT NOTE - INPATIENT     Room Number: 946/791-D             Presenting Problem: s/p L TKA    Problem List  Active Problems:    Preoperative testing      ASSESSMENT   Pt was seen today BID for therapy. Pt is received in the bed. and standing up from a chair with arms (e.g., wheelchair, bedside commode, etc.): A Little   -   Moving from lying on back to sitting on the side of the bed?: A Little   How much help from another person does the patient currently need. ..   -   Moving to a session  200' SBA pm session   Goal #4 Patient independently performs home exercise program for ROM/strengthening per the instructions provided in preparation for discharge.    Goal #4   Current Status IN PROGRESS   Goal #5 L Knee AROM 0 degrees extension t

## 2018-05-26 NOTE — PLAN OF CARE
Discharge to home or other facility with appropriate resources Progressing    Planning discharge home today- [address] to follow at Home  Return mobility to safest level of function Progressing      SKIN/TISSUE INTEGRITY - ADULT    • Incision(s), wo

## 2018-05-26 NOTE — PROGRESS NOTES
[place] [facility]    Progress Note    Hermila Castro Patient Status:  Inpatient    1935 MRN [id number]   Location [facility] 4W/SW/SE Attending Marietta Womack MD   [address] Day # 2 PCP Cortney Zaldivar.  Nadira Kunz MD     Subjective:     Co

## 2018-05-26 NOTE — SLP NOTE
ADULT SWALLOWING EVALUATION    ASSESSMENT    ASSESSMENT/OVERALL IMPRESSION:    Pt reported \"swallowing difficulty for last few months. \" Stated that sometimes she chokes on food and water at home and has had episode of \"drooling saliva in the evening\". precautions with Pt  )     Medication Administration Recommendations: One pill at a time  Treatment Plan/Recommendations: Wound benefit from outpatient dysphagia therapy (/outpt VFSS)  Discharge Recommendations/Plan: Outpatient SLP    HISTORY   MEDICAL HIS Thickening of the right minor fissure. Moderate thoracolumbar scoliosis. Chronic compression deformity of L1.          OBJECTIVE   ORAL MOTOR EXAMINATION  Dentition: Natural;Functional  Symmetry: Within Functional Limits  Strength:  Within Functional Limits

## 2018-05-26 NOTE — PROGRESS NOTES
[facility]    Progress Note    Kiara Lindo Patient Status:  Inpatient    1935 MRN [id number]   Location [facility] 4W/SW/SE Attending Pantera Lundberg MD   [facility] Day # 2 PCP Manish Kennedy.  Abbi Gamboa MD     Subjective:   [address] (H) 05/26/2018   CA 8.1 (L) 05/26/2018   ALB 4.0 05/12/2018   ALKPHO 34 05/12/2018   BILT 0.5 05/12/2018   TP 7.2 05/12/2018   AST 21 05/12/2018   ALT 14 05/12/2018   PTT 32.4 05/12/2018   INR 1.0 05/24/2018   T4F 1.09 05/12/2018   TSH 6.61 (H) 05/12/2

## 2018-05-26 NOTE — CM/SW NOTE
C orders have been entered. Discharge orders in place. LAST informed Lianna/VINI of pt's discharge today.      Berenice Cohn, [address]

## 2018-05-26 NOTE — PHYSICAL THERAPY NOTE
PHYSICAL THERAPY KNEE TREATMENT NOTE - INPATIENT     Room Number: 925/206-F             Presenting Problem: s/p L TKA    Problem List  Principal Problem:    Primary osteoarthritis of left knee  Active Problems:    A-fib (HCC)    Chronic obstructive pulmona Saturation: 90-97%  Room air  Shortness of breath    AM-PAC '6-Clicks' INPATIENT SHORT FORM - BASIC MOBILITY  How much difficulty does the patient currently have. ..  -   Turning over in bed (including adjusting bedclothes, sheets and blankets)?: A Little Stand at assistance level: modified independent     Goal #2  Current Status SBA with the RW    Goal #3 Patient is able to ambulate 300 feet with assistive device at assistance level: modified independent    Goal #3   Current Status 200' with the RW SBA   G

## 2018-06-01 NOTE — TELEPHONE ENCOUNTER
Patient called she has been taking Singulair for one month and she wants to know if she needs to continue taking?

## 2018-06-01 NOTE — TELEPHONE ENCOUNTER
Linda called from [facility] called, [place] nursing d/c patient Tuesday next week PT is continnuing Monday ortho [phone]

## 2018-06-12 NOTE — PROGRESS NOTES
POST-OP KNEE EVALUATION:   Referring Physician: Dr. Asher Palma  Diagnosis:    S/P total knee arthroplasty, left           Date of Service: 6/12/2018     PATIENT SUMMARY   Norma Enamorado is a 81year old y/o female who presents to therapy today s/p L TK proprioception. These impairments are leading to functional limitations including;  unable to perform stairs, walk without AD, get up and down from floor, squat or lunge, or be weightbearing for extended periods.  She is showing post-operative recovery rubén 1      Total Timed Treatment: 10 min     Total Treatment Time: 50 min     PLAN OF CARE:    Goals: (To be met in 8 visits)   · Pt will improve knee extension ROM to 0 deg to allow proper heel strike during gait and terminal knee extension in stance  · Pt wi PT    [de-identified] certification required: Yes  I certify the need for these services furnished under this plan of treatment and while under my care.     X___________________________________________________ Date____________________    Certification From: 6/1

## 2018-06-14 NOTE — PROGRESS NOTES
Diagnosis: S/P total knee arthroplasty, left   Authorized # of Visits:  8  ([employer] MEDICARE ADV IHP IPA)       Next MD visit: none scheduled  Fall Risk: standard         Precautions: n/a           Medication Changes since last visit?: No  Subjective: \"I don grossly 4+/5 to be able to get up and down from the floor safely  · Pt will demonstrate increased hip ER/ABD strength to 4+/5 to perform stepping and squatting activities without excessive femoral IR/ADD  · Pt will improve SLS to >20s to improve safety and

## 2018-06-19 NOTE — PROGRESS NOTES
Diagnosis: S/P total knee arthroplasty, left   Authorized # of Visits:  8  (Minal DWYER IHP IPA)       Next MD visit: none scheduled  Fall Risk: standard         Precautions: n/a           Medication Changes since last visit?: No  Subjective: Patient Supine QS w SLR x 15 L x 2x10        Supine sliding board heel slide x 15 x 5 sec hold  x Supine swiss ball knee to chest x 20        Supine hip abd x 15 L x -         Sidelying hip abd 2 x 10 L x x10        Sidelying clamshell 15 x 5 sec hold x 2x10

## 2018-06-21 NOTE — PROGRESS NOTES
Diagnosis: S/P total knee arthroplasty, left   Authorized # of Visits:  8  (Sherill Headings ADV IHP IPA)       Next MD visit: none scheduled  Fall Risk: standard         Precautions: n/a           Medication Changes since last visit?: No  Subjective: \"I wal min  L-5       Supine QS 15 x 10 sec hold x 10 x 10 sec  15 x 10 sec        Supine QS w SLR x 15 L x 2x10 2 x 10       Supine sliding board heel slide x 15 x 5 sec hold  x Supine swiss ball knee to chest x 20 Supine SB knee to chest x 20       Supine hip a

## 2018-06-21 NOTE — PROGRESS NOTES
ADULT VIDEOFLUOROSCOPIC SWALLOWING STUDY    Referring Physician: Dr. Summer Zayas  Diagnosis: dysphagia   Date of Service: 6/21/2018   Radiologist: Dr. Fortune Enter results and/or plan of treatment.     HISTOR a level of 0/10. Oral phase: Within functional limits,except patient was not able to propel a barium tablet posteriorly with the aid of multiple consistencies. Eventually she was able to swallow it when she placed it further back in her mouth.     Phar Recommendations:  Solids: Regular  Liquids:  Thin    Recommended compensatory strategies:   Sit upright  Small sips  Small bites  Eat slowly  Alternate liquids and solids  Swallow twice with each bite  No straw  chin down when swallowing liquids    Medicati Yes  I certify the need for these services furnished under this plan of treatment and while under my care.       X______________________________________________ Date________________  Certification From: 4/96/1722      To: 9/19/2018

## 2018-06-21 NOTE — PATIENT INSTRUCTIONS
SWALLOW INSTRUCTIONS    DIET:  Regular foods and liquids    SIT UPRIGHT    SMALL BITES    SMALL SIPS    EAT SLOWLY    ALTERNATE LIQUIDS AND SOLIDS    SWALLOW TWICE WITH EACH BITE    NO STRAW    USE CHIN TUCK FOR LIQUIDS       If patient cannot remember to

## 2018-06-21 NOTE — TELEPHONE ENCOUNTER
[facility] calling states patient is needing referral for out patient speech therapy. Please call Sintia Courser [phone] when referral is authorized.

## 2018-06-23 PROBLEM — G45.9 TRANSIENT CEREBRAL ISCHEMIA: Status: ACTIVE | Noted: 2018-01-01

## 2018-06-23 PROBLEM — G45.9 TRANSIENT CEREBRAL ISCHEMIA, UNSPECIFIED TYPE: Status: ACTIVE | Noted: 2018-01-01

## 2018-06-23 NOTE — ED PROVIDER NOTES
Patient Seen in: [facility] AND [facility] Emergency Department    History   Patient presents with:  Numbness Weakness (neurologic)    Stated Complaint: SOB    HPI    28-year-old female here with her daughter after she been having some tingling and numbness with enough tissue. Removed rest and benign. No date: TONSILLECTOMY  1978: TOTAL ABDOM HYSTERECTOMY      Comment: MUNIR and BSO. Due to fibroids. No abNl paps. Uterine cancer and S/P chemo.    No date: TOTAL HIP REPLACEMENT        Smoking status that her the affected digits above.  strength is equal.  Plantar flexion strength is equal.  She has negative arm and leg drift and there are no obvious coordination deficits. Skin: Skin is warm and dry. Psychiatric: Normal mood and affect.   Joie Soliman CHEST (CPT=[id number]), 1/09/2018, 16:26. [facility], [facility]. [facility], XR CHEST PA + LAT CHEST (XJJ=[id number]), 5/12/2018, 7:11. INDICATIONS: Shortness of breath and left hand numbness of acute onset. Focal psychomotor deficit. TECHNIQUE:   Two views. loss the right temporal lobe, a likely related to presence of an arachnoid cyst in the right middle cranial fossa. WHITE MATTER: Mild chronic white matter ischemic changes. Lacunar infarct in the left caudate head.  CEREBELLUM: No edema, hemorrhage, mass, STRUCTURE: Normal.  No visible obstruction, stricture, or dilatation. OTHER: Negative. CONCLUSION:  Intermittent laryngeal penetration to the level of the cords with probable silent aspiration.   A full report will follow by the speech pathologist.

## 2018-06-23 NOTE — ED NOTES
Pt came in for decreased sensation and mild weakness to left hand since about 0900 this morning. Pt reports decreased sensation in her first three digits on that hand. Trouble opening a water bottle. Able to move entire hand/extremity and complete ROM.  Najma Milligan
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
## 2018-06-24 NOTE — SLP NOTE
ADULT SWALLOWING EVALUATION    ASSESSMENT    ASSESSMENT/OVERALL IMPRESSION:  Patient is well known to me from recent outpatient VFSS on 6/21/18.   Results of that evaluation revealed deep laryngeal penetration and likely subsequent silent aspiration of thin is Nl. 1-17: Echo shows NL LVEF but LVH.     • Arrhythmia     A FIB   • Cataract    • COPD (chronic obstructive pulmonary disease) (HCC)     using bipap at night   • Disorder of thyroid     THYROIDECTOMY   • Dizziness 12-16   • High blood pressure    • High impaired  (Please note: Silent aspiration cannot be evaluated clinically.  Videofluoroscopic Swallow Study is required to rule-out silent aspiration.)    Esophageal Phase of Swallow: No complaints consistent with possible esophageal involvement

## 2018-06-24 NOTE — CONSULTS
[facility]    Report of Consultation    Ronald Aguayofranklin Patient Status:  Observation    1935 MRN [id number]   Location [facility] 3W/SW Attending Isak Renteria MD   Hosp Day # 0 PCP Juvencio Mnior.  Jae Peñaloza MD     Date of Admiss 12-16   • High blood pressure    • High cholesterol    • History of blood transfusion    • Hypothyroid    • Osteoarthritis    • Osteopenia     S/P reclast in [place].     • Other and unspecified hyperlipidemia    • Personal history of antineoplastic chemot Yes    Social History Narrative    Julissa Mcneal is a  for the last 15 yrs.  smoked around pt but for about 20 yrs;  secondary to a dementia related illness. Mother of 2 adult children.  She lives with her older daughter, Antonia Klinefelter, in [place] Propionate 50 MCG/ACT Nasal Suspension by Each Nare route daily. Lisinopril-Hydrochlorothiazide 20-12.5 MG Oral Tab Take 2 tablets by mouth daily. Chlorhexidine Gluconate 0.12 % Mouth/Throat Solution daily as needed.      simvastatin 5 MG Oral Tab Take Pallet elevates symmetrically. XI. Shoulder shrug is intact  XII.  Tongue is midline    Motor Exam:  Muscle tone normal  No atrophy or fasciculations  Strength- upper extremities 5/5 proximally and distally                  - lower  extremities 5/5 proxima the left caudate head. 3.  Benign-appearing arachnoid cyst in the anterior aspect of the right middle cranial fossa.   Asymmetric volume loss of the right temporal lobe likely related to the presence of this arachnoid cyst.  Dictated by (CST): Moreno Saucedo, evaluated in the clinic. In the meantime patient will continue with anticoagulation, she is already on a statin.   Doppler of carotids will be done an echocardiogram will be done, if those are not revealing any significant abnormality patient could be disc

## 2018-06-25 NOTE — PLAN OF CARE
NEUROLOGICAL - ADULT    • Achieves stable or improved neurological status Progressing    • Achieves maximal functionality and self care Progressing        Patient Centered Care    • Patient preferences are identified and integrated in the patient's plan of

## 2018-06-25 NOTE — TELEPHONE ENCOUNTER
Niranjan Mast from [address] [place] [address] waiting for Dr Medhat Crowe to do the Medications portion to send pt home

## 2018-06-25 NOTE — PHYSICAL THERAPY NOTE
PHYSICAL THERAPY EVALUATION - INPATIENT     Room Number: 327/327-A  Evaluation Date: 6/25/2018  Type of Evaluation: initial  Physician Order: PT Eval and Treat    Presenting Problem: TIA  Reason for Therapy: Mobility Dysfunction and Discharge Planning of antineoplastic chemotherapy     40 YRS AGO   • PONV (postoperative nausea and vomiting)    • Sleep apnea     mild-using bipap at night   • Unspecified essential hypertension    • Uterine cancer (HCC)    • Visual impairment     glasses       [facility] MOBILITY  How much difficulty does the patient currently have. ..  -   Turning over in bed (including adjusting bedclothes, sheets and blankets)?: None   -   Sitting down on and standing up from a chair with arms (e.g., wheelchair, bedside commode, etc.): N

## 2018-06-25 NOTE — CM/SW NOTE
6/25/18 CM Discharge planning   Pt resides with dtr, one level condo reports independent prior to admission. Pt seen and evaled by PT, pt will continue o/p therapy at discharge. CM will continue to follow asa needed.   Belem Bangura X [phone]

## 2018-06-25 NOTE — CONSULTS
[facility] AND [facility]  Cardiology Consultation    Hermila Castro Patient Status:  Observation    1935 MRN [id number]   Location [facility] 3W/SW Attending Alfonso Neff MD   Hosp Day # 0 PCP Cortney Zaldivar.  Nadira Kunz MD     Reason for [facility] enough tissue. Removed rest and benign. No date: TONSILLECTOMY  1978: TOTAL ABDOM HYSTERECTOMY      Comment: MUNIR and BSO. Due to fibroids. No abNl paps. Uterine cancer and S/P chemo.    No date: TOTAL HIP REPLACEMENT  Family History Av.2 °F (36.8 °C), Min:97.9 °F (36.6 °C), Max:98.9 °F (37.2 °C)       Intake/Output Summary (Last 24 hours) at 18 1608  Last data filed at 18 1405   Gross per 24 hour   Intake              420 ml   Output             1125 ml   Net MD  6/25/2018  4:08 PM

## 2018-06-25 NOTE — PROGRESS NOTES
[place] [facility] - [facility]    Progress Note    Ahsan Band Patient Status:  Observation    1935 MRN [id number]   Location [facility] 3W/SW Attending Bonnie Acosta MD   Hosp Day # 0 PCP Bessie Ellsworth.  Gerson Serrato MD     Subjective:     Co pulse 95, temperature 98 °F (36.7 °C), temperature source Oral, resp. rate 18, height 4' 10\" (1.473 m), weight 141 lb 1.6 oz (64 kg), SpO2 96 %, not currently breastfeeding. Nursing note and vitals reviewed.    Constitutional: She is oriented to person, Bowel sounds are normal. She exhibits no distension. There is no tenderness. There is no rigidity, no rebound, no guarding and no CVA tenderness.    Musculoskeletal:        Right wrist: She exhibits normal range of motion, no tenderness, no bony tenderness, Results  Component Value Date   WBC 7.2 06/23/2018   HGB 12.0 06/23/2018   HCT 36.6 06/23/2018    (H) 06/23/2018   CREATSERUM 0.74 06/23/2018   BUN 17 06/23/2018    06/23/2018   K 3.9 06/23/2018    06/23/2018   CO2 29 06/23/2018   GLU 10 [facility] (etx=[id number])    Result Date: 6/24/2018  CONCLUSION:  1. No hemodynamically significant stenosis of the internal carotid arteries. 2. Antegrade flow is documented in the vertebral arteries bilaterally.     Dictated by (CST): Mukesh Bear MD on 6/

## 2018-06-25 NOTE — HISTORICAL OFFICE NOTE
Blanca Ann  : 1935  ACCOUNT:  [id number]  [phone]  PCP: Dr. Luisana Coffman     TODAY'S DATE: 2018  DICTATED BY:  [Dr. Wilda Alvarado]      CHIEF COMPLAINT: [Followup of Atrial fibrillation, paroxysmal, Followup of Hypercholesterolemia, p no trauma and normocephalic. EYES: conjunctivae not injected and no xanthelasma. ENT: mucosa pink and moist. NECK: jugular venous pressure not elevated. RESP: respirations with normal rate and rhythm, clear to auscultation.  GI: no masses, tenderness or hep Chlorhexidine Gluconat0.12%     As directed                              02/14/17 Fish Oil              1200MG    1cap once daily                          02/14/17 Levothyroxine Sodium  88MCG     1tab once daily                          02/14/17 Multivitam

## 2018-06-25 NOTE — PROGRESS NOTES
[facility] AND [facility]  Neurology Progress Note    [place] Patient Status:  Observation    1935 MRN [id number]   Location [facility] 3W/SW Attending Brandon Guerra MD   Hosp Day # 0 PCP Rich Hopkins.  Vania Saeed MD     Subjective:  Rand Rodriguez cyanosis or edema     Neurological:     Mental Status- Alert and oriented x3.   Normal attention span and concentration  Thought process intact  Memory intact- recent and remote  Mood intact  Fund of knowledge appropriate for education and age    Language i 6/23/2018  CONCLUSION:  Stable chest demonstrate pleural-parenchymal scarring without radiographically evident acute intrathoracic process.     Dictated by (CST): En Madden MD on 6/23/2018 at 13:25    Approved by (CST): En Madden MD on 6/23/2018 with ECG of 02/06/2018 15:42:06 No significant changes have occurred Electronically signed on 06/23/2018 at 18:10 by Riya Tony MD        Assessment:  Patient Active Problem List:     Osteopenia     Combined hyperlipidemia     Vitamin D deficiency     HTN

## 2018-06-25 NOTE — OCCUPATIONAL THERAPY NOTE
OCCUPATIONAL THERAPY EVALUATION - INPATIENT     Room Number: 327/327-A  Evaluation Date: 6/25/2018  Type of Evaluation: Initial  Presenting Problem:  (Numbness L hand)    Physician Order: IP Consult to Occupational Therapy  Reason for Therapy: ADL/IADL Dys vomiting)    • Sleep apnea     mild-using bipap at night   • Unspecified essential hypertension    • Uterine cancer [facility])    • Visual impairment     glasses       Past Surgical History  Past Surgical History:  6/13/17: ANTERIOR THR PRECAUTIONS Right      C lower body clothing?: None  -   Bathing (including washing, rinsing, drying)?: None  -   Toileting, which includes using toilet, bedpan or urinal? : None  -   Putting on and taking off regular upper body clothing?: None  -   Taking care of personal groomin

## 2018-06-26 NOTE — DISCHARGE SUMMARY
DISCHARGE SUMMARY     Chucky Rogel Patient Status:  Observation    1935 MRN [id number]   Location [facility] 3W/SW Attending No att. providers found   [facility] Day # 0 PCP Katrin Patel MD     Date of Admission: 2018  Date of Disch recommendations (brief descriptions): •     Lab/Test results pending at Discharge:   ·     Consultants:  • Cards. Neurology.      Discharge Medication List:     Discharge Medications      CHANGE how you take these medications      Instructions Prescription SR      Take 1 capsule (325 mg total) by mouth Q12H.    Quantity:  60 capsule  Refills:  2        STOP taking these medications    naproxen 500 MG Tabs  Commonly known as:  NAPROSYN              Where to Get Your Medications      These medications were sent by mouth Q12H., Print, Disp-60 capsule, R-2              Discharge Diet: Cardiac diet low fat.      Discharge Activity: As tolerated    Follow-up appointment:   Ingrid Mcbride MD  [address] [place] [address] [phone]    In 1 week      Jenny

## 2018-06-26 NOTE — TRANSITION NOTE
History of Present Illness:  Ethan Day is a a(n) 81year old female who presents with complaint of left finger numbness for last 2-3 days. Also has been complaining of shortness of breath which has been chronic with exertion.   Denies any kishore docusate sodium 100 MG Caps  Commonly known as:  COLACE  What changed:  when to take this      Take 100 mg by mouth 2 (two) times daily.    Quantity:  30 capsule  Refills:  0     simvastatin 5 MG Tabs  Commonly known as:  ZOCOR  What changed:  how much to [facility], [address] [facility]  [address] [facility], [phone], [phone]  [address] [place] [address]    Hours:  24-hours Phone:  [phone]   · simvastatin 5 MG Tabs

## 2018-06-27 NOTE — ED PROVIDER NOTES
Patient Seen in: [facility] AND [facility] Emergency Department    History   Patient presents with:  Hypotension (cardiovascular)    Stated Complaint: sent by dr Oliver Guzmán for low b/p     HPI    82 yo F with PMH afib on xarelto AC, COPD, HTN, HL presenting from pulmo SURG      Comment: [facility]. Not cancer. 2011: Bx. not                enough tissue. Removed rest and benign. No date: TONSILLECTOMY  1978: TOTAL ABDOM HYSTERECTOMY      Comment: MUNIR and BSO. Due to fibroids. No abNl paps.                 Uterine tobacco: Never Used                      Alcohol use: No                Review of Systems :  Constitutional: As per HPI  Respiratory: Negative for cough and shortness of breath. Cardiovascular: Negative for chest pain and palpitations.    Geovani Terry Leukocyte Esterase Urine Moderate (*)     Bacteria Urine Few (*)     All other components within normal limits   MAGNESIUM - Abnormal; Notable for the following:     Magnesium 1.7 (*)     All other components within normal limits   CBC W/ DIFFERENTIAL - Ab Medication List as of 6/27/2018  5:00 PM

## 2018-06-27 NOTE — PROGRESS NOTES
HPI:    Patient ID: Hermila Castro is a 81year old female.     HPI  Doing well on a CPAP  Very mild dyspnea on exertion  No cough or sputum  No chest pain  Recent admission with slurred speech and some weakness in the left side  Denied dizziness or syn PHYSICAL EXAM:   Physical Exam   Constitutional: She is oriented to person, place, and time. She appears well-nourished. Cardiovascular: Regular rhythm. Regular tachycardia   Pulmonary/Chest: Breath sounds normal. No respiratory distress.  She has no

## 2018-06-27 NOTE — ED INITIAL ASSESSMENT (HPI)
Andrei Garg was seen by Dr. Edvin Bray this afternoon for sleep apnea and was advised to come to ER d/t low BP, pt denies dizziness/blurred vision/headache/nausea/vomiting, pt on halter monitor since 6/25 d/t fast hear rate

## 2018-06-28 PROBLEM — E87.70 HYPERVOLEMIA: Status: ACTIVE | Noted: 2018-01-01

## 2018-06-28 PROBLEM — E86.0 DEHYDRATION: Status: ACTIVE | Noted: 2018-01-01

## 2018-06-28 NOTE — PATIENT INSTRUCTIONS
Decrease lisinopril -hct to 20-12.5 mg one tablet daily     Continue all your same medications    Call if having any dizziness, lightheadedness, heart racing, palpitations, chest pain, shortness of breath, coughing, swelling, weight gain, fever, chills or

## 2018-06-28 NOTE — PROGRESS NOTES
Patient arrive on time for PT treatment however per patient I went to ER yesterday d/t my BP was very low, so they check me and send home and they told me I am dehydration but my HR was 124 so have A-fib.  Explain to patient you went twice ER regarding your

## 2018-06-28 NOTE — PROGRESS NOTES
Heart Failure [facility] [address] [place] [address] Patient Status:  Outpatient    1935 MRN [id number]   Location [address] [place] [address] MD Abel Palma MD Delories Mormon is a 81year old female wh list:  Hypotension with dehydration  Left hand numbness, negative for acute CVA, possible TIA vs myasthenia gravis  Dysphasia  Persistent BUTLER  History of paroxysmal atrial fib, in SR on rythmol, anticoagulated on xarelto  OA s/p Knee surgery 5-21-18  HTN 06/27/2018 03:53 PM   CL 98 06/27/2018 03:01 PM   CO2 26 06/27/2018 03:01 PM   GLU 98 06/27/2018 03:01 PM   CA 9.1 06/27/2018 03:01 PM   ALB 4.0 05/12/2018 06:42 AM   ALKPHO 34 05/12/2018 06:42 AM   BILT 0.5 05/12/2018 06:42 AM   TP 7.2 05/12/2018 06:42 AM thrombus prevention and when to call APN/clinic.  40 minutes spent providing counseling, coordination of care and education related specifically to atrial fibrillation including pathophysiology, treatments, symptoms to report, rate vs rhythm control, antico rate 100s-120s. Recent CBC, BMP and troponins were normal except for borderline low/ normal sodium.   EKG on June 23, June 27 and today are sinus tachycardia 100-108 bpm. + JVD at 90°, lungs clear, regular rate and rhythm, no abdominal bloating or lower ex

## 2018-06-29 NOTE — TELEPHONE ENCOUNTER
Called patient to inform her the results of the Myasthenia Gravis Reflex Panel were normal. Patient verbalized understanding.

## 2018-06-29 NOTE — TELEPHONE ENCOUNTER
----- Message from Lenore Salinas MD sent at 6/29/2018 12:08 PM CDT -----  Please let the patient know that results of the tests so far were normal.    Thank you

## 2018-07-02 NOTE — PROGRESS NOTES
Heart Failure [facility] [address] [place] [address] Patient Status:  Outpatient    1935 MRN [id number]   Location [address] [place], [place] [address]MD Kelvin Eubanks MD Charlies Shillings is a 81year old female wh bathroom. Using cpap nightly. Took all am meds. Taking lisinopril–HCTZ 20–12.5 mg daily. Denies increased swelling. Denies orthopnea, near syncope or falls.      Review of Systems    Constitutional: negative for chills, fatigue and fevers  Respiratory: nega : 141 lb 1.6 oz (64 kg)        General appearance: alert, appears stated age and cooperative  Neck:,+ JVD at 90 degrees  Lungs: clear to auscultation bilaterally  Chest wall: no tenderness  Heart: S1, S2 normal, no murmur, click, rub or gallop, regular rat daily    Hypothyroidism with history of thyroid nodules and resection  -Levothyroxine dose increased with TSH borderline elevated May 2018  -Repeat thyroid function testing next week. Decision making:.    Hypotension with dehydration 6.27.18 with dec ounces per day    Follow up with Dr. Sharyle Chew on July 12 th    Follow up with the specialty care clinic as needed or as directed    [address] [facility]   7/2/18

## 2018-07-02 NOTE — PATIENT INSTRUCTIONS
Increase eating potassium rich foods    Continue all your same medications    Call if having any dizziness, lightheadedness, heart racing, palpitations, chest pain, shortness of breath, coughing, swelling, weight gain, fever, chills or worsening symptoms.

## 2018-07-09 NOTE — PROGRESS NOTES
Diagnosis: S/P R BEAU  Authorized # of Visits:  5/12  HMO         Next MD visit: 9/1/17  Fall Risk: standard         Precautions: BEAU precautions       Medication Changes since last visit?: No  Subjective: Pt reports that she went swimming three times and w
spouse/ Stuart

## 2018-07-13 NOTE — H&P
[facility]    Cardiac Electrophysiology H&P Addendum    [place] Location:Cath Lab Suites   [facility] [id number] MRN [id number]   Admission Date 7/31/2018 Procedure Date 7/31/2018   Attending Physician Davide Francis MD Procedure [facility]ici

## 2018-07-16 PROBLEM — I95.9 HYPOTENSIVE EPISODE: Status: RESOLVED | Noted: 2018-01-01 | Resolved: 2018-01-01

## 2018-07-16 PROBLEM — G47.30 SLEEP APNEA: Status: ACTIVE | Noted: 2017-12-13

## 2018-07-16 PROBLEM — J44.9 COPD (CHRONIC OBSTRUCTIVE PULMONARY DISEASE) (HCC): Status: ACTIVE | Noted: 2018-02-06

## 2018-07-16 PROBLEM — E87.70 HYPERVOLEMIA: Status: RESOLVED | Noted: 2018-01-01 | Resolved: 2018-01-01

## 2018-07-16 PROBLEM — D72.829 ELEVATED WBCS: Status: RESOLVED | Noted: 2018-01-01 | Resolved: 2018-01-01

## 2018-07-16 PROBLEM — E86.0 DEHYDRATION: Status: RESOLVED | Noted: 2018-01-01 | Resolved: 2018-01-01

## 2018-07-16 PROBLEM — Z92.21 PERSONAL HISTORY OF ANTINEOPLASTIC CHEMOTHERAPY: Status: ACTIVE | Noted: 2018-01-01

## 2018-07-16 NOTE — PATIENT INSTRUCTIONS
atric: She has a normal mood and affect. Her behavior is normal.   Nursing note and vitals reviewed. ASSESSMENT/PLAN:   Paroxysmal atrial fibrillation (hcc)  (primary encounter diagnosis) F/U cards. Abnormal swallowing F/U speech therapy.
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
## 2018-07-17 NOTE — TELEPHONE ENCOUNTER
If we are talking about the simvastatin, can increase the dose to 10 mg is fine with me. As long as is okay with patient.

## 2018-07-20 NOTE — PROGRESS NOTES
Neurology Follow up Visit     Referred By: Dr. Elizabeth ref. provider found    Chief Complaint: Patient presents with:  Hospital F/U: pt is here for hospital f/u 6-27-18 dehydration and L hand numbness. per pt denies any new symptom, feeling well.       HPI: COPD (chronic obstructive pulmonary disease) (HCC)     using bipap at night   • Dizziness 12-16   • History of blood transfusion    • Osteoarthritis    • Osteopenia     S/P reclast in [place].     • Personal history of antineoplastic chemotherapy     41 YR DINNER, Disp: , Rfl:   •  multivitamin Oral Tab, Take 1 tablet by mouth daily. , Disp: 30 tablet, Rfl: 0  •  Calcium Carb-Cholecalciferol (CALCIUM 600+D) 600-800 MG-UNIT Oral Tab, Take 2 tablets by mouth daily. , Disp: 60 tablet, Rfl: 0  •  acetaminophen 325 rub.  IX. Pallet elevates symmetrically. XI. Shoulder shrug is intact  XII.  Tongue is midline    Motor Exam:  Muscle tone normal  No atrophy or fasciculations  Strength- upper extremities 5/5 proximally and distally, though there is some ulnar deviation b discussed that she might come back in consider further workup. Education and counseling provided to patient. Instructed patient to call my office or seek medical attention immediately if symptoms worsen.   Patient verbalized understanding of informati

## 2018-07-27 NOTE — TELEPHONE ENCOUNTER
Patient called reporting HTN and 's. States her BP was 170's prior to calling AFIB clinic. While on the phone, she retook her BP - down to 150/78. HR remains 115-120. Review of heart rates reveals she runs 100's frequently.  Has cardioversion planned

## 2018-07-31 NOTE — PROGRESS NOTES
Pt is able to sit up and ambulate without difficulty. Pt voided and tolerated fluids and food. Instruction provided, patient/family verbalizes understanding. Dr. Robert Gaines spoke with patient/family post procedure.

## 2018-07-31 NOTE — ANESTHESIA PREPROCEDURE EVALUATION
Anesthesia PreOp Note    HPI:     Felecia Barksdale is a 81year old female who presents for preoperative consultation requested by: * No surgeons listed *    Date of Surgery: 7/31/2018    * No procedures listed *  Indication: * No pre-op diagnosis entere osteoarthritis of right hip         Date Noted: 06/13/2017      A-fib [facility])         Date Noted: 06/13/2017      BUTLER (dyspnea on exertion)         Date Noted: 05/30/2017      Hypothyroidism         Date Noted: 01/06/2017      HTN (hypertension)         Date tablet Rfl: 1 7/30/2018 at Unknown time   XARELTO 20 MG Oral Tab Take 1 tablet (20 mg total) by mouth daily with food. WITH DINNER Disp:  Rfl:  7/30/2018 at Unknown time   multivitamin Oral Tab Take 1 tablet by mouth daily.  Disp: 30 tablet Rfl: 0 7/30/2018 Drug use: No    Sexual activity: No     Other Topics Concern     Service No    Blood Transfusions Yes    Caffeine Concern No    Occupational Exposure No    Hobby Hazards No    Sleep Concern Yes    Stress Concern No    Weight Concern Yes    Special (+) diabetes mellitus,   Abdominal  - normal exam             Anesthesia Plan:   ASA:  3  Plan:   MAC  Informed Consent Plan and Risks Discussed With:  Patient      I have informed Michaela Lava and/or legal guardian or family member of the nature o

## 2018-07-31 NOTE — PROCEDURES
[facility] - [facility]    Cardiac Electrophysiology Procedure Note    [place] Location:Cath Lab Suites   [facility] [id number] MRN [id number]   Admission Date 7/31/2018 Procedure Date 7/31/2018   Attending Physician Mali Luna MD Procedure [facility]

## 2018-07-31 NOTE — ANESTHESIA POSTPROCEDURE EVALUATION
Patient: Elisa Perales    Procedure Summary     Date:  07/31/18 Room / Location:  [place] [address].    Anesthesia Start:  6555 Anesthesia Stop:  9289    Procedure:  EP CARDIOVERSION 1X Diagnosis:       Atypical atrial flutter (HC

## 2018-08-01 NOTE — TELEPHONE ENCOUNTER
[id number] Vanesa dutta a message she needs a referral to see hematology her surgery is next week can she wait until after surgery ?  please advice
Better prior to Surgery. Can we get her in sooner?
Please sign referral
Abdomen soft, non-tender

## 2018-08-07 NOTE — PROGRESS NOTES
Diagnosis: dysphagia  Authorized # of Visits:  4         Next MD visit: none scheduled  Fall Risk: standard         Precautions: n/a           Medication Changes since last visit?:  no  Subjective:  Patient was seen for a video swallow study on 6/21/18 [facility] trying to remember to do chin tuck. After initial cue she was consistently able to use chin tuck during session. Goal #3 Patient will reduce risk of aspiration by completing the following dysphagia exercises to 90% accuracy 20 repetitions 2-3x/day.    Ava Headley

## 2018-08-14 NOTE — PROGRESS NOTES
Diagnosis: dysphagia  Authorized # of Visits:  4         Next MD visit: none scheduled  Fall Risk: standard         Precautions: n/a           Medication Changes since last visit?:  no  Subjective:  Patient reports completing exercises three times per day signs of aspiration. Goal progressing. Goal #2 The patient/family/caregiver will demonstrate understanding and implementation of aspiration precautions and swallow strategies independently over 3 session(s).   Sit upright  Small sips  Small bites  Eat sl

## 2018-08-21 NOTE — PROGRESS NOTES
Hematology Progess Note    Patient Name: Hermila Castro   YOB: 1935   Medical Record Number: [id number]   CSN: [id number]  Consulting Physician: Charlie Cantrell MD  Referring Physician(s): Katrin Munoz  Date of Visit: 8/21/2018     Rupinder complete review of systems.     Past Medical History:  Past Medical History:   Diagnosis Date   • Cataract    • COPD (chronic obstructive pulmonary disease) ([facility] [address]     using bipap at night   • Dizziness 12-16   • History of blood transfusion    • Yohana Zavala Blood Transfusions Yes    Caffeine Concern No    Occupational Exposure No    Hobby Hazards No    Sleep Concern Yes    Stress Concern No    Weight Concern Yes    Special Diet No    Back Care No    Exercise Yes    Bike Helmet No    Seat Belt Yes    Self-Exam Examination:    General: Patient is alert and oriented x 3, not in acute distress. Psych: Friendly, cooperative with appropriate questions and responses  HEENT: EOMs intact. Oropharynx is clear. No signs of oropharyngeal bleeding. Neck: No JVD.  No palpa PLATELET    (A35.75) History of splenectomy  Plan: CBC WITH DIFFERENTIAL WITH PLATELET  53-JEUH-TSP female with past medical history significant for splenectomy 1972, history of A. fib on Xarelto since 1/2017 presents for evaluation of prolonged PT and thr CBC, so we will not plan mutational analysis for ET at this time    --RTC in 6 months for repeat CBC and clinical evaluation to determine if additional testing is required        Emotional Well Being:    The patient's emotional well being was assessed and

## 2018-08-21 NOTE — PROGRESS NOTES
Diagnosis: dysphagia  Authorized # of Visits:  4         Next MD visit: none scheduled  Fall Risk: standard         Precautions: n/a           Medication Changes since last visit?:  no  Subjective:  Patient reports taking some medications with applesauce w and completed x10 x20  Mild cues  70% accuracy. Pt still c/o drooling, but not during meals.           Goals:   Goal #1 The patient will tolerate general diet consistency and thin liquids without overt signs or symptoms of aspiration with 100 % accuracy ov

## 2018-08-21 NOTE — PROGRESS NOTES
Hematology Consultation Note    Patient Name: Jarocho Maldonado   YOB: 1935   Medical Record Number: [id number]   CSN: [id number]   Consulting Physician: Julian Lozada MD  Referring Physician(s): Trent Munoz  Date of Consultation: 5/21/ glasses       Past Surgical History:  Past Surgical History:  6/13/17: ANTERIOR THR PRECAUTIONS Right      Comment: DR. Marbin Rodriguez  No date: APPENDECTOMY  1-10: CATARACT Bilateral  No date: CHOLECYSTECTOMY  2014: COLONOSCOPY      Comment: Benign polyps.    7/ Kendall Cheung, in [facility], [place]. She formerly worked as a [employer] as a .        Allergies:     Aspirin                 RASH    Current Medications:    No outpatient prescriptions have been marked as taking for the 8/21/18 encounter (Offic good bowel sounds. No hepatosplenomegaly. No palpable mass. Extremities: No edema or calf tenderness. Left knee without signs of erythema, swelling, warmth  Neurological: Grossly intact.       Labs:      Lab Results  Component Value Date/Time   WBC 6.1 has normal PTT level.   Does not require additional testing at this time      2.) Thrombocytosis --secondary to likely inflammation of left knee osteoarthritis    --Patient has a mild thrombocytosis at 430K with normal WBC, differential, hemoglobin/hematocr

## 2018-08-28 NOTE — TELEPHONE ENCOUNTER
Patient was called and notified of referrals being placed in the system by Dr. Kyra Mejia. No further questions / concerns at this time.

## 2018-08-28 NOTE — PROGRESS NOTES
FINAL TREATMENT NOTE AND DISCHARGE SUMMARY    Diagnosis: dysphagia  Authorized # of Visits:  4         Next MD visit: none scheduled  Fall Risk: standard         Precautions: n/a           Medication Changes since last visit?:  no  Subjective:  Patient rep x10  60% acc. Not able to increase pitch much. Completed x15  70% accuracy  Pitch still somewhat low, but larynx still elevating. x20  70% accuracy. x20  80% acc  independent   S/S of aspiration? Yes. See below. Only when not using chin tuck.  Cough when consult in view of dysarthria, dysphagia and drooling.    2.  Repeat Video Swallow Study  Skilled Services: dysphagia therapy    Charges: 23119     Total Timed Treatment: 40 min  Total Treatment Time: 40 min  Tyler Dyer MA/CCC-SLP  Speech Language Ivania Lundberg

## 2018-08-31 NOTE — H&P
[facility]    Cardiac Electrophysiology H&P Addendum    [place] Location:Cath Lab Suites   [facility] [id number] MRN [id number]   Admission Date 9/14/2018 Procedure Date 9/14/2018   Attending Physician No att. providers found Procedure P

## 2018-09-03 PROBLEM — Z00.00 ADULT GENERAL MEDICAL EXAMINATION: Status: ACTIVE | Noted: 2018-01-01

## 2018-09-03 PROBLEM — Z71.85 VACCINE COUNSELING: Status: ACTIVE | Noted: 2018-01-01

## 2018-09-03 NOTE — ASSESSMENT & PLAN NOTE
DC cardioversion done July 31, 2018 by Dr. Denia Mendez. With successful conversion to normal sinus rhythm.

## 2018-09-03 NOTE — ASSESSMENT & PLAN NOTE
6-24-18: carotid U/S: ONCLUSION:   1. No hemodynamically significant stenosis of the internal carotid arteries. 2. Antegrade flow is documented in the vertebral arteries bilaterally.

## 2018-09-03 NOTE — ASSESSMENT & PLAN NOTE
3-17-16: dexa: CONCLUSION:  Bone mineral density values for the total hip are compatible with the diagnosis of osteopenia by WHO definition (T score between -1.0 and -2.5).   If therapy is initiated, follow-up study is recommended in 2 years for further prince

## 2018-09-03 NOTE — ASSESSMENT & PLAN NOTE
Careful with diet and excercise at least 30 minutes 3-4 times a week. Check sugars at different times on different dates. Careful with low sugars. Carry something with you and check sugar if can. Can carry lavon cracker, etc. Decrease carbohydrates.  But a

## 2018-09-04 PROBLEM — M19.90 OSTEOARTHRITIS: Status: ACTIVE | Noted: 2018-01-01

## 2018-09-04 PROBLEM — H54.7 VISUAL IMPAIRMENT: Status: ACTIVE | Noted: 2018-01-01

## 2018-09-04 NOTE — PATIENT INSTRUCTIONS
ASSESSMENT/PLAN:   Combined hyperlipidemia  Done 6-18. Needs recheck Goal LDL: < 70.      Osteopenia  3-17-16: dexa: CONCLUSION:  Bone mineral density values for the total hip are compatible with the diagnosis of osteopenia by WHO definition (T score betwee Encounter      POC Urinalysis, Auto, w/o scope [88017]    Meds This Visit:  Signed Prescriptions Disp Refills    simvastatin 10 MG Oral Tab 90 tablet 1      Sig: TK 1 T PO QD           Imaging & Referrals:  None     Nohemi Romero's SCREENING SCHEDULE screening (once between ages 73-68) IPPE only No results found for this or any previous visit.  Limited to patients who meet one of the following criteria:   • Men who are 73-68 years old and have smoked more than 100 cigarettes in their lifetime   • Anyone Annually to at least age 76, and as needed after 76 There are no preventive care reminders to display for this patient. Please get this Mammogram regularly   Immunizations      Influenza  Covered Annually No orders found for this or any previous visit.  Ple [place])  [email or web address]  This link also has information from the [address] [place] [address] regarding Advance Directives.

## 2018-09-04 NOTE — PROGRESS NOTES
HPI:    Patient ID: Michaela Javier is a 81year old female. HPI   Michaela Javier is a 81year old female who presents for a complete physical exam.   HPI:   Patient presents with:   Annual: medicare; seen Dr. Sophie Donald, stopped propafenone and to star 135 lb (61.2 kg)  07/30/18 : 137 lb (62.1 kg)  07/20/18 : 134 lb (60.8 kg)    Body mass index is 27.88 kg/m².      Labs:     Lab Results  Component Value Date/Time   WBC 6.1 08/16/2018 10:18 AM   HGB 13.3 08/16/2018 10:18 AM    08/16/2018 10:18 AM Propionate 50 MCG/ACT Nasal Suspension by Each Nare route daily. Disp:  Rfl:    Chlorhexidine Gluconate 0.12 % Mouth/Throat Solution daily as needed. Disp:  Rfl: 3   docusate sodium 100 MG Oral Cap Take 100 mg by mouth 2 (two) times daily.  (Patient suman Disorder Daughter    • Bleeding Disorders Neg       Social History:  Social History    Marital status:               Spouse name:                       Years of education:                 Number of children: 2             Social History Main Topics 12:53 PM   K 3.8 07/26/2018 12:53 PM    07/26/2018 12:53 PM   CO2 29 07/26/2018 12:53 PM   CREATSERUM 0.74 07/26/2018 12:53 PM   CA 9.2 07/26/2018 12:53 PM   AST 26 07/02/2018 11:11 AM   ALT 14 07/02/2018 11:11 AM   TSH 2.85 07/09/2018 02:13 PM   T4F light-headedness, numbness and headaches. Hematological: Negative for adenopathy. Psychiatric/Behavioral: Positive for sleep disturbance.  Negative for agitation, behavioral problems, confusion, decreased concentration, dysphoric mood, hallucinations, s using bipap at night   • Dizziness 12-16   • History of blood transfusion    • Osteoarthritis    • Osteopenia     S/P reclast in [place].     • Personal history of antineoplastic chemotherapy     40 YRS AGO   • PONV (postoperative nausea and vomiting)    • injected, not scarred, not perforated, not erythematous, not retracted and not bulging. Tympanic membrane mobility is normal. No middle ear effusion. No hemotympanum. No decreased hearing is noted.    Left Ear: Tympanic membrane, external ear and ear canal and phonation normal. Neck supple. Normal carotid pulses, no hepatojugular reflux and no JVD present. No tracheal tenderness present. Carotid bruit is not present. No tracheal deviation present. No thyroid mass and no thyromegaly present.    Cardiovascular: present. She has no cervical adenopathy. Right cervical: No superficial cervical, no deep cervical and no posterior cervical adenopathy present.        Left cervical: No superficial cervical, no deep cervical and no posterior cervical adenopathy done July 31, 2018 by Dr. Abbey Bacon. With successful conversion to normal sinus rhythm. But tachy. Saw cards. Has F/U. Atherosclerosis of both carotid arteries/PVD  6-24-18: carotid U/S: ONCLUSION:   1.  No hemodynamically significant stenosis of the interna Little interest or pleasure in doing things (over the last two weeks)?: Not at all  Feeling down, depressed, or hopeless (over the last two weeks)?: Not at all  PHQ-2 SCORE: 0     Advanced Directive:   She does have a Living Will but we do NOT have it on Take 1 tablet (20 mg total) by mouth daily with food. WITH DINNER   multivitamin Oral Tab Take 1 tablet by mouth daily. Calcium Carb-Cholecalciferol (CALCIUM 600+D) 600-800 MG-UNIT Oral Tab Take 2 tablets by mouth daily.    acetaminophen 325 MG Oral Tab T encounter: 4' 10\" (1.473 m). Weight as of this encounter: 133 lb 6.4 oz (60.5 kg).     Medicare Hearing Assessment  (Required for AWV/SWV)    Hearing Screening    Screening Method:  Questionnaire  I have a problem hearing over the telephone:  No I have Diagnoses and all orders for this visit:    Diabetes mellitus without complication ([facility] [address]  -     HEMOGLOBIN A1C; Future    Adult general medical examination  -     URINALYSIS, AUTO, W/O SCOPE    Abnormal urine  -     URINE CULTURE, ROUTINE; Future  - applicable    Flex Sigmoidoscopy Screen every 10 years No results found for this or any previous visit. No flowsheet data found. Fecal Occult Blood Annually No results found for: FOB No flowsheet data found.     Glaucoma Screening      Ophthalmology Yana Johnson found This may be covered with your pharmacy  prescription benefits      SPECIFIC DISEASE MONITORING Internal Lab or Procedure External Lab or Procedure      Annual Monitoring of Persistent     Medications (ACE/ARB, digoxin diuretics, anticonvulsants.) do NOT have it on file in [address] [place] [address] Rd.    Advance care planning including the explanation and discussion of advance directives standard forms performed Face to Face with patient and Family/surrogate (if present), and forms available to patient in AVS         She h (six) hours as needed for Pain. Fluticasone Propionate 50 MCG/ACT Nasal Suspension by Each Nare route daily. Chlorhexidine Gluconate 0.12 % Mouth/Throat Solution daily as needed.      docusate sodium 100 MG Oral Cap Take 100 mg by mouth 2 (two) times da have trouble following the conversations when two or more people are talking at the same time:  No   I have trouble understanding things on the TV:  No I have to strain to understand conversations:  No   I have to worry about missing the telephone ring or ROUTINE; Future  -     URINALYSIS, ROUTINE; Future    Encounter for annual health examination    Other orders  -     simvastatin 10 MG Oral Tab; TK 1 T PO QD         Diet assessment: good     PLAN:  The patient indicates understanding of these issues and a Annually: Diabetics, FHx Glaucoma, AA>50, > 65 No flowsheet data found. Bone Density Screening      Dexascan Every two years Last Dexa Scan:   XR DEXA BONE DENSITOMETRY (CPT=77080) 03/17/2016    No flowsheet data found.     Pap and Pelvic      Pa anticonvulsants.)    Potassium  Annually Potassium (mmol/L)   Date Value   07/26/2018 3.8   04/25/2016 4.4    No flowsheet data found. Creatinine  Annually Creatinine (mg/dL)   Date Value   07/26/2018 0.74   04/25/2016 0.96    No flowsheet data found.

## 2018-09-07 NOTE — TELEPHONE ENCOUNTER
If she has a urinary tract infection procedure cannot be done. I know her concern with side effects on the prescription, would recommend go ahead and having her start the prescription.

## 2018-09-07 NOTE — TELEPHONE ENCOUNTER
Patient calling was prescribed cipro patient read side effects is afraid to take medications she has procedure coming up and is worried that it would interfere with test.

## 2018-09-13 NOTE — PROGRESS NOTES
ADULT VIDEOFLUOROSCOPIC SWALLOWING STUDY    Referring Physician: Dr. Clover Abdalla  Diagnosis: Pharyngeal dysphagia   Date of Service: 9/13/2018   Radiologist: Dr. Rayvon Schilder     Dear Dr. Clover Abdalla,  This letter is to inform you of Pablo Mar seated upright and viewed laterally. Pain Assessment: The patient denies pain. Oral phase:  WFL. Pt with good oral acceptance and bilabial seal across all consistencies given. Pt with intact mastication, bolus formation, and transit.      Pharyngeal dysarthria. Patient in agreement with POC.      FCM category and level: Swallowing, 5  Current status G Code: Initial, Swallowing, CJ: 20-39% impaired, limited, or restricted  Goal status G Code: Swallowing, CI: 1%-19% impaired, limited, or restricted  PLAN certification required: Yes  I certify the need for these services furnished under this plan of treatment and while under my care.       X______________________________________________ Date________________  Certification From: 9/23/4467      To: 12/12/2018

## 2018-09-13 NOTE — PROGRESS NOTES
ADULT VIDEOFLUOROSCOPIC SWALLOWING STUDY    Referring Physician: Dr. Gerson Serrato  Diagnosis: Pharyngeal dysphagia   Date of Service: 9/13/2018   Radiologist: Dr. Kay Madrid     Dear Dr. Gerson Serrato,  This letter is to inform you of Jeimy Hernandez seated upright and viewed laterally. Pain Assessment: The patient denies pain. Oral phase:  WFL. Pt with good oral acceptance and bilabial seal across all consistencies given. Pt with intact mastication, bolus formation, and transit.      Pharyngeal dysarthria. Patient in agreement with POC.      FCM category and level: Swallowing, 5  Current status G Code: Initial, Swallowing, CJ: 20-39% impaired, limited, or restricted  Goal status G Code: Swallowing, CI: 1%-19% impaired, limited, or restricted  PLAN certification required: Yes  I certify the need for these services furnished under this plan of treatment and while under my care.       X______________________________________________ Date________________  Certification From: 3/70/2183      To: 12/12/2018

## 2018-09-13 NOTE — PATIENT INSTRUCTIONS
SWALLOWING INSTRUCTIONS    DIET: Regular Solids/ Thin liquids with chin tuck     ____ SIT UPRIGHT    ____ SMALL BITES AND SIPS    ____ EAT SLOWLY    ____ ALTERNATED LIQUIDS AND SOLIDS    ____ Alisha Lynn WITH EACH BITE  ____SWALLOW-COUGH-SWALLOW     __

## 2018-09-14 NOTE — PROGRESS NOTES
Patient rhythm on monitor looked regular to this RN. Dr Murray  notified, EKG ordered and done. NSR seen on 12-lead. Dr Murray  at bedside, explained to patient no need for cardioversion.  Patient instructed to resume all medications and to make a follow-up appoi

## 2018-09-14 NOTE — ANESTHESIA PREPROCEDURE EVALUATION
Anesthesia PreOp Note    HPI:     Viri Yusuf is a 81year old female who presents for preoperative consultation requested by: * No surgeons listed *    Date of Surgery: 9/14/2018    * No procedures listed *  Indication: * No pre-op diagnosis entere Date Noted: 11/28/2017      Obesity (BMI 30.0-34. 9)         Class: Chronic         Date Noted: 06/26/2017      Anemia associated with acute blood loss         Date Noted: 06/14/2017      Primary osteoarthritis of right hip         Date Noted: 06/13/2017 chemo.   No date: TOTAL HIP REPLACEMENT      Medications Prior to Admission:  Pediatric Multivit-Minerals-C (GUMMI BEAR MULTIVITAMIN/MIN) Oral Chew Tab Chew 1 tablet by mouth daily.  Disp:  Rfl:  9/13/2018 at Unknown time   MULTAQ 400 MG Oral Tab Take 1 tab • Bipolar Disorder Daughter    • Bleeding Disorders Neg      Social History    Socioeconomic History      Marital status:        Spouse name: Not on file      Number of children: 2      Years of education: Not on file      Highest education level: 09/10/2018    CA 9.6 09/10/2018          Vital Signs: Body mass index is 27.17 kg/m². weight is 59 kg (130 lb). Her blood pressure is 146/70 and her pulse is 83.  Her respiration is 20 and oxygen saturation is 97%.    09/12/18  1347 09/14/18  0853   BP:

## 2018-09-18 NOTE — PROGRESS NOTES
Neurology Follow up Visit     Referred By: Dr. Nico Hutchinson    Chief Complaint: Patient presents with:  Numbness: LOV 7-20-18 pt is here to f/u on L hand numbness. Per pt no more numbness.   Stroke (neurologic): Pt c/o of speech problems for 9 months, sometimes There is no evidence of fasciculations or loss of muscle mass.      She came back for the follow-up in September 2018, at that point she fell of her problem with speech and hoarseness of the voice that she was getting worse instead of better, she was having Smokeless tobacco: Never Used       Alcohol use No       Drug use: No       Family History   Problem Relation Age of Onset   • Bipolar Disorder Daughter    • Cancer Daughter         Multiple Myeloma;  in    • Melanoma Brother    • Other (Other) S or swelling  ENT- Hearing intake, smell preserved, normal glutition  Neck- No masses or adenopathy  Cv: pulses were palpable and normal, no cyanosis or edema     Neurological:     Mental Status- Alert and oriented x3.   Normal attention span and concentrati (H) 09/10/2018      Lab Results   Component Value Date    BUN 12 09/10/2018    CA 9.6 09/10/2018    ALT 16 09/10/2018    AST 26 09/10/2018    ALB 4.1 09/10/2018     09/10/2018    K 3.6 09/10/2018     09/10/2018    CO2 29 09/10/2018      I have

## 2018-09-18 NOTE — TELEPHONE ENCOUNTER
L/m advising Pt. insurance was verified and MRI C-spine wo is a covered benefit and does not require authorization. Can proceed with scheduling appt.

## 2018-09-18 NOTE — TELEPHONE ENCOUNTER
----- Message from Lenore Salinas MD sent at 9/18/2018  3:22 PM CDT -----  Please let the patient know that results of these particular lab tests so far were normal.    Thank you

## 2018-09-26 NOTE — TELEPHONE ENCOUNTER
Relayed MRI of C spine results to pt per Dr. Selwyn Diamond in previous note. Pt verbalized understanding and will continue with scheduled EMG appointment 9/27/18.

## 2018-09-26 NOTE — TELEPHONE ENCOUNTER
----- Message from Shoshana Steele MD sent at 9/26/2018  4:19 PM CDT -----  Please let patient know that MRI C spine showed some postsurgical changes. As well as some degenerative changes, though those are relatively mild.   EMG should still be planne

## 2018-09-27 NOTE — PROCEDURES
HISTORY:    Ronald Villatoro is a 81year old left handed female with a complaint of dysarthria, shortness of breath, with weight loss (intentional). PHYSICAL:    Please refer to the clinic note.     TECHNICAL SUMMARY:    There were no significant tech The needle EMG examination was performed in 19 muscles. It was normal in 15 muscle(s): L. Deltoid, R. Deltoid, L. Biceps brachii, R. Biceps brachii, L. Triceps brachii, R. Triceps brachii, L. Extensor digitorum communis, R.  Extensor digitorum communis, L. R Tibial - AH      Ankle AH 5.94 5.7 4.53 Ankle - AH 8        Knee AH 13.39 3.5 5.57 Knee - Ankle 39 7.45 52       Sensory NCS      Nerve / Sites Rec.  Site Onset Lat Peak Lat NP Amp PP Amp Segments Peak Diff     ms ms µV µV  ms   R [place] - Alisha Reyna L. First dorsal interosseous Ulnar C8-T1 N None None None None N N N N   R. First dorsal interosseous Ulnar C8-T1 1+ 1+ None CRD None 3-5 1+ 2+ Reduced   R. Vastus medialis Femoral L2-L4 N None None None None N N N N   R.  Vastus lateralis Femoral L2-L4 N N These findings, even somewhat abnormal do not account for all of the patient's symptoms. There was some denervation and reinnervation changes only noted in some of her muscles in the right arm, right leg and thoracic spine.   However all of them could have

## 2018-09-28 NOTE — TELEPHONE ENCOUNTER
Patient called, she wants a second opinion she saw the neurologist yesterday and is not satisfied with what he said.  Please authorize the referral for  Dr. Davis Sessions [phone] FCW#[phone]

## 2018-10-01 NOTE — TELEPHONE ENCOUNTER
Dr Christopher Chicas is out of network as well. Pt will need to see a ENT that is In Network for the second opinion.

## 2018-10-03 NOTE — TELEPHONE ENCOUNTER
Pt is calling in regards to a referral she requested but her ins is stating it's out of network pls call to discuss with her.

## 2018-10-03 NOTE — TELEPHONE ENCOUNTER
She said she was going to pay out of pocket and she want to see the specialist that she wanted to see. Prior messages.

## 2018-10-03 NOTE — TELEPHONE ENCOUNTER
Pt stated her referral for Voice is out of network and Insurance called her to notify,   Pt will like to know can you provide another specialist, for in network. Detail Level: Zone Detail Level: Simple

## 2018-10-05 NOTE — TELEPHONE ENCOUNTER
Per patient, she is willing to pay out of pocket, but a referral will allow Medicare to help with at least part of the costs. Looking for [facility] Varun Eastman[facility].

## 2018-10-11 NOTE — TELEPHONE ENCOUNTER
Informed patient of the result and requested a copy of it since she will be here on Monday to see Dr Rosmery Leal.

## 2018-10-16 NOTE — PROGRESS NOTES
Kiara Lindo is a 81year old female. Patient presents with: Follow - Up: vocal cord dysfunction- LOV 4/17/18    HPI:   She continues to have difficulty with her speech.   Her voice quality seems to be better but she is having more difficulty actuall [place].     • Personal history of antineoplastic chemotherapy     40 YRS AGO   • PONV (postoperative nausea and vomiting)    • Sleep apnea     mild-using bipap at night   • Visual impairment     glasses      Social History:  Social History    Tobacco Use consent was obtained. Procedure/risks were explained. Questions were answered. Correct patient identified. Correct side and site confirmed. A topical spray of 0.25% Neosynephrine was sprayed into the nose.     Laryngoscopy:  Flexible Fiberoptic Laryngo

## 2018-10-16 NOTE — TELEPHONE ENCOUNTER
Pt calling reporting bp up to 178/81 this afternoon, , bp this am was 119/69, HR 91. Having increased enamorado, denies cp, palpitations, headache, wt gain or swelling. She does have some mild epigastric pressure with belching. Now bp 199/101, HR 90.  Victor Hugo Walker

## 2018-10-18 PROBLEM — R03.0 ELEVATED BLOOD PRESSURE READING: Status: ACTIVE | Noted: 2018-01-01

## 2018-10-18 NOTE — PROGRESS NOTES
Heart Failure [facility] [address] [place] [address] Patient Status:  Outpatient    1935 MRN [id number]   Location [address] [place], [place] [address]MD Wilmar Ryder MD Hilda Madelin is a 81year old female wh lisinopril-hct dose on 10-16-18. Yesterday 139/95 and had one dose of lisinopril- hct. Weight down 35 lb in last 10 months, working on decreasing calories. Denies heart racing, cp, dizziness, lightheadedness, near syncope or falls.    Seeing neurology and h 149/69   Pulse 74   Wt 127 lb (57.6 kg)   SpO2 99%   BMI 26.54 kg/m²     Clinical weights:  1) 168 2) 169 3) 166 4) 155 5) 144 6) 142 7) 127    Home weights:               2) 168.6                     5) 141 6) 141 7) 126.2      Wt Readings from Last 5 Enc of care and education related specifically to atrial fibrillation including pathophysiology, treatments, symptoms to report, anticoagulation, stroke prevention, medications and follow up.         Assessment:  History of paroxysmal atrial fib  -s/p Cardiover

## 2018-10-18 NOTE — PATIENT INSTRUCTIONS
Take lisinopril-hct 20-12.5 mg one tab daily in the morning     Continue all your same medications    Call if having any dizziness, lightheadedness, heart racing, palpitations, chest pain, shortness of breath, coughing, swelling, weight gain, fever, chills

## 2018-10-22 NOTE — TELEPHONE ENCOUNTER
Would like a referral for speech therapy to be done at [facility]>  please

## 2018-10-25 NOTE — PATIENT INSTRUCTIONS
ASSESSMENT/PLAN:   Essential hypertension  (primary encounter diagnosis)? Control. Careful with diet and excercise at least 30 minutes 3-4 times a week. Check blood pressures at different times on different days.  Can purchase own blood pressure mon

## 2018-10-25 NOTE — PROGRESS NOTES
HPI:    Patient ID: Lucia Quinones is a 81year old female. HPI   Swallowing difficulty. Clearing of throat. Drooling. Denies pain with swallowing. Eatting and coughing afterwards. BUTLER < 1 block. Denies orthopena. Denies GERD.  Speech therapy and 4 Encounters:  10/25/18 : 145/75  10/18/18 : 149/69  10/15/18 : 120/60    Labs:   Lab Results   Component Value Date/Time     (H) 10/18/2018 07:46 AM     10/18/2018 07:46 AM    K 3.9 10/18/2018 07:46 AM    CL 96 10/18/2018 07:46 AM    CO2 30 10/ Neurological: Negative for dizziness, tremors, seizures, syncope, facial asymmetry, speech difficulty, weakness, light-headedness, numbness and headaches. All other systems reviewed and are negative.           Current Outpatient Medications:  docusate s • APPENDECTOMY     • CATARACT Bilateral 1-10   • CHOLECYSTECTOMY     • COLONOSCOPY  2014    Benign polyps.     • EP CARDIOVERSION 1X  7/31/2018        • HIP TOTAL REPLACEMENT Right 6/13/2017    Performed by Glenys Cintron MD at [address] [place] [address]   • KNEE TOT perforated, not erythematous, not retracted and not bulging. Tympanic membrane mobility is normal.  No middle ear effusion. No hemotympanum. No decreased hearing is noted.    Nose: Nose normal. No mucosal edema, rhinorrhea, nose lacerations, sinus tendernes and no bradypnea. She is not intubated. No respiratory distress. She has decreased breath sounds in the left lower field. She has no wheezes. She has no rhonchi. She has no rales. She exhibits no tenderness. Musculoskeletal: She exhibits no edema.    Lymp light.  Extraocular motions are normal.   Nystagmus: none   Diplopia: none  Upgaze: normal  Downgaze: normal    CN VII   Right facial weakness: R side facial droop.      CN IX, X   Palate: symmetric    CN XI   Right sternocleidomastoid strength: normal    C

## 2018-11-02 NOTE — TELEPHONE ENCOUNTER
----- Message from Sesar Tao MD sent at 11/2/2018  7:44 AM CDT -----  Please let patient know that MRI brain didn't show significant abnormalities.

## 2018-11-07 NOTE — PATIENT INSTRUCTIONS
ORAL MOTOR EXERCISES    FACIAL EXERCISES: To strengthen weakened facial muscles    1. Open mouth as wide as you can, as if saying \"ahh. \"  Hold. Relax. Repeat. 2. Round your lips, as if saying \"ohh. \"  Hold. Relax. Repeat.   3. Alternate steps 1 cheeks. LABIAL EXERCISES: To strengthen weakened lips    1. Alternate rounding your lips and smiling, as if saying \"oh - ee. \"  2. Open your mouth wide, then pucker your lips. Hold each position before  alternating.   3. Close lips tightly, as if sayin

## 2018-11-07 NOTE — PROGRESS NOTES
ADULT SPEECHSWALLOW EVALUATION:   Referring Physician: Dr. Catalan Credit  Diagnosis:   Dysarthria   Dysphagia  Date of Service: 11/7/2018     PATIENT SUMMARY  Trip Aguirre is a 81year old y/o female who presents to therapy today with complaints of difficu pharyngeal residue observed post solid trials. The level of penetration with thin liquids was reduced with the implementation of a chin tuck maneuver. Pharyngeal residue partially cleared with swallow-cough-swallow again strategy.  No aspiration observed ac palpation. Pt tolerates chopped solids and thin liquids with a chin tuck with no overt clinical signs of aspiration (e.g., immediate/delayed throat clear, immediate/delayed cough, wet vocal quality, increased O2 effort) observed across all trials given.   I any difficulties with cognitive-communication. Continue to monitor and assess as clinically warranted.      ORAL MOTOR MECHANISM  Facial and Oral Structure/Appearance: Overall reduced   Symmetry: R-sided facial droop; R-sided Tongue Deviation   Strength: Therapy    Education or treatment limitation: None  Rehab Potential:good    FCM category and level: Motor Speech, 4  Current status G Code: Initial, Motor Speech, CK: 40-59% impaired, limited, or restricted  Goal status G Code:  Motor Speech, CJ: 20-39% imp

## 2018-11-12 NOTE — PATIENT INSTRUCTIONS
1. Please continue with your speech oral drills as well as your speech drills that were provided for you. 2. Please continue with use of SLOB during speaking and during speech drills (ma-mi-mo)   3.  In regards to swallowing, please do not speak while you

## 2018-11-12 NOTE — PROGRESS NOTES
Diagnosis: Dysphagia/Dysarthria   Authorized # of Visits: 8     Next MD visit: none scheduled  Fall Risk: standard         Precautions: n/a           Medication Changes since last visit?: No  Subjective: Pt reports some concerns regarding her swallowing.  Shona Valladares swallow/aspiraiton strategies, and target dysphagia exercises. Handouts provided for generalization purposes. Dysarthria Therapy   Pt demonstrates slight progress toward speech production objectives; however, dysarthria persists.  Pt completes oral ag Treatment: 45 min  Total Treatment Time: 45 min

## 2018-11-13 NOTE — TELEPHONE ENCOUNTER
Patient is requesting a referral for Dr Coretta Platt office for 3 month check up patient has appointment in December.

## 2018-11-14 NOTE — PATIENT INSTRUCTIONS
1. Continue to swallow more frequently throughout the day with the effortful swallow maneuver to reduce the occurrence of coughing on saliva   2. Continue swallow strategies   3.  Continue swallow exercises       Darleen Samson M.S. [address] [facility]

## 2018-11-14 NOTE — PROGRESS NOTES
Diagnosis: Dysphagia/Dysarthria   Authorized # of Visits: 8     Next MD visit: none scheduled  Fall Risk: standard         Precautions: n/a           Medication Changes since last visit?: No  Subjective: Pt reports slight improvements in her swallowing and with chin tuck maneuver with no overt clinical signs of aspiration (e.g., immediate/delayed throat clear, immediate/delayed cough, wet vocal quality, increased O2 effort) observed across all trials.  SLP to continue with meal assessment x1, train swallow/as sessions. In progress    Goal #3 The patient will utilize \"SLOB\" strategies across multi-syllabic words, phrases, and sentences provided minimal support across 12 sessions. In progress         Plan:   1. Continue with dysphagia POC   2.  Continue with

## 2018-11-19 PROBLEM — R63.4 WEIGHT LOSS: Status: ACTIVE | Noted: 2018-01-01

## 2018-11-19 NOTE — PROGRESS NOTES
Diagnosis: Dysphagia/Dysarthria   Authorized # of Visits: 8     Next MD visit: none scheduled  Fall Risk: standard         Precautions: n/a           Medication Changes since last visit?: No  Subjective: Pt followed up with Dr. Luly Jarquin 11/19/2018.  Pt reports Oral agility Drills  x50  x50  N/A        Verbal Agility Drills  x50 x50  N/A        SLOB Multi-syllabic Words  40% accuracy  50% accuracy  N/A        SLOB Phrases  35% accuracy  40% accuracy  N/A        SLOB Sentences  30% accuracy  35% accuracy  N/A      Speech Goals      Goal #1 The patient will complete oral agility drills with 90% accuracy provided minimal support across 12 sessions.    In progress    Goal #2 The patient will complete verbal agility drills with 90% accuracy provided minimal suppor

## 2018-11-19 NOTE — PATIENT INSTRUCTIONS
HEAD LIFTING MANEUVER    PURPOSE To strengthen muscles of the neck in order to facilitate opening of the bottom of the throat (e.g., upper esophageal sphincter) for food passage.     APPLICABILITY Patients who exhibit reduced upper esophageal sphincter open

## 2018-11-19 NOTE — H&P
[facility] - Gastroenterology                                                                                                               Reason for consult: d (10/2018)  Normal exam, no airway obstruction. SLP  Pt presents with mild-moderate pharyngeal dysphagia evidenced by deep laryngeal penetration with thin liquids and significant pharyngeal residue observed post solid trials.  The level of penetration wi vaccines. • THYROIDECTOMY POST PREV THYR SURG  1962    3/4 romeved first. Not cancer. 2011: Bx. not enough tissue. Removed rest and benign. • TONSILLECTOMY     • TOTAL ABDOM HYSTERECTOMY  1978    MUNIR and BSO. Due to fibroids. No abNl paps.  Uterine ca Allergies:    Aspirin                 RASH    ROS:   CONSTITUTIONAL:  negative for fevers, rigors  EYES:  negative for diplopia   RESPIRATORY:  negative for severe shortness of breath  CARDIOVASCULAR:  negative for crushing sub-sternal chest pain  GA dysphagia. MRI of head showing no acute stroke, but only chronic microvascular changes. No recent EGD, and while my suspicion for stricture is low, will r/out with endoscopy. Dilation can be done if stricture is present.  Sx strongly suggest neuromuscular e [phone]  11/19/2018        This note was partially prepared using EUDOWEB voice recognition dictation software. As a result, errors may occur. When identified, these errors have been corrected.  While every attempt is made to correct errors fabricio

## 2018-11-19 NOTE — PATIENT INSTRUCTIONS
1. Schedule upper endoscopy (EGD WITH PEG) with MAC [Diagnosis: weight loss, dysphagia] - schedule for December in hospital only  2. We will talk to your cardiologist about holding the Digital Reasoning 54 for 2 days prior to the endoscopy test   3. Blood test  4.  CAT

## 2018-11-19 NOTE — TELEPHONE ENCOUNTER
Routed to cardiology- please advise on Dr. Martinez Postal message below, thank you. DOS 12/19/18. Request for orders also faxed to Dr. Naren Blum office at [phone]. Fax confirmation received 11/20/18 @4373.    -Awaiting orders at this time.

## 2018-11-19 NOTE — TELEPHONE ENCOUNTER
Scheduled for:  [phone] with PEG [id number]   Provider Name: Dr. Stefano Olmedo  Date:  12/19/18  Location:  [facility]  Sedation:  MAC  Time:  10:45 am, arrival 9:45 am (75 mins per SDK)  Prep: NPO after midnight  Meds/Allergies Reconciled?:  Physician reviewed  \"2.  We will

## 2018-11-19 NOTE — TELEPHONE ENCOUNTER
Pedro Lama MD routed conversation to [facility] Gi Clinical Staff 46 minutes ago (12:20 PM)      Pedro Lama MD 47 minutes ago (12:20 PM)         GI Clinical Staff:   Patient going for G-tube placement/EGD.  Can you please call Dr. Bibi Mansfield (cardiology) of

## 2018-11-19 NOTE — TELEPHONE ENCOUNTER
GI RN's    Pt is scheduled for EGD with PEG placement on 12/19/18 at [address] [place] [address] and is on Xarelto. See Dr. Frank Castellanos note below. \"2.  We will talk to your cardiologist about holding the xarelto for 2 days\"    Looks like Jeana Davis, NP is the prescribing provide

## 2018-11-20 NOTE — TELEPHONE ENCOUNTER
Take all medicines with sips of water even on morning of stress test except lisinopril hydrochlorothiazide. Can light take later in the day if she wants to bring with her.

## 2018-11-20 NOTE — TELEPHONE ENCOUNTER
Patient calling has appointment for nuclear stress test Friday patient is wanting to know medication instructions

## 2018-11-21 NOTE — PATIENT INSTRUCTIONS
1. PLEASE USE A SLOW EATING RATE WITH SMALL BITES/SIPS   2. CONTINUE WITH SLOB STRATEGIES ACROSS ALL SPEAKING TASKS.        Arely Pruett M.S. [address] [facility]  Speech Language Pathologist   Phone Number [phone]

## 2018-11-21 NOTE — PROGRESS NOTES
Diagnosis: Dysphagia/Dysarthria   Authorized # of Visits: 8     Next MD visit: none scheduled  Fall Risk: standard         Precautions: n/a           Medication Changes since last visit?: No  Subjective: Pt states, \"I think I'm going to change my mind abo Exercises  x20  x20 protrusion     x20 lateralizations  x20 lateralizations Pt reports L-sided ear pain with L-tongue lateralizations  x20 tongue lateralizations     x10 L sided buccal pushes       x10 R sided buccal pushes       Oral agility Drills  x50 syllable with emphasis in multi-syllabic words. Pt requires thorough review of \"SLOB\" strategies across phrases/sentences. Pt reports, \"I need to practice more at home. \" SLP provided HEPs for generalization purposes.      Goals:     Swallow Goals:

## 2018-11-23 NOTE — TELEPHONE ENCOUNTER
Dione Guan MD   You 14 minutes ago (2:43 PM)      5 days is acceptable.  Stroke risk increases slightly during that time, but remains low.  AG    Routing Comment

## 2018-11-23 NOTE — TELEPHONE ENCOUNTER
Dr. Benigno Keys-    Per Dr. Adan Tyler message below, may we have the pt hold xarelto x 3 days prior to G-tube placement AND 2 days after placement (total of 5 days)? Please advise, thank you.

## 2018-11-23 NOTE — TELEPHONE ENCOUNTER
MD Yanelis Birch NP 2 days ago      Amarilis Coles to stop xarelto for up to 3 days.   No additional cardiac testing required prior to procedure, low risk for cardiac morbidity, AG    Routing Comment

## 2018-11-23 NOTE — TELEPHONE ENCOUNTER
Discussed w/ Dr. Cruz Paredes and pt is to hold the xarelto starting on 12/16/18 and resume on 12/21/18. Pt repeated to demonstrate understanding. No further questions or concerns at this time.

## 2018-11-26 NOTE — PROGRESS NOTES
Diagnosis: Dysphagia/Dysarthria   Authorized # of Visits: 8     Next MD visit: none scheduled  Fall Risk: standard         Precautions: n/a           Medication Changes since last visit?: No  Subjective: Pt states, \"People at my condo think I had a stroke x10 K     x10 G  x10 K words     x10 G words     Meseret reviewed. Pt states, \"I'm not able to do that one. \"   Pt attempted and competed x3      Effortful Swallow  x20  x20  x20  x20  x20      Shaker Exercises  Not targeted today  Not targeted  x30 seate version of Ugo implemented d/t pt report of unable to complete lying down. Pt declined solids in today's session stating, \"I had a pretty big lunch. \"  Pt tolerated thin liquids with chin tuck maneuver with no overt clinical signs of aspiration (e.g., complete verbal agility drills with 90% accuracy provided minimal support across 12 sessions.    In progress    Goal #3 The patient will utilize \"SLOB\" strategies across multi-syllabic words, phrases, and sentences provided minimal support across 12 sessi

## 2018-11-26 NOTE — PATIENT INSTRUCTIONS
1. Please adhere to the home programs that have been given to you. 2. Please complete swallowing exercises 2-3x daily 10-20 repetitions each. 3. Please continue to use your safe swallowing strategies provided via [facility] Guideline.        Asha

## 2018-11-28 NOTE — PATIENT INSTRUCTIONS
SLOW   LOUD   OVER-EXAGGERATE   BREATH     Diaphragmatic breathing reviewed   Dysphagia exercises given .       Taryn Rogers M.S. [address] [facility]  Speech Language Pathologist   Phone Number [phone]

## 2018-11-28 NOTE — PROGRESS NOTES
Diagnosis: S/P total knee arthroplasty, left   Authorized # of Visits:  8  (Deshaun Garcia ADV [facility] IPA)       Next MD visit: 6/27/18 pulmonary DR, 7/9/18 orth Dr, 7/12/19 PCP  Fall Risk: standard         Precautions: n/a           Medication Changes since l Problem: Patient Care Overview  Goal: Plan of Care/Patient Progress Review  Discharge Planner PT   Patient plan for discharge: not stated  Current status: Per discussion with RN attempting to wean O2 and assess for oxygen needs at discharge. Pt currently on 0.5L/min with SpO2 low 90s; trialed on RA and pt desaturates both at rest and with activity - see VSFS. Pt unable to transfer supine>sit despite maxA of 1, requires mod-maxA of 2 for supine>sit, pt limited by significant back and R rib pain. Pt requires modA progressing to CGA to sit EOB. Pt unable to transfer from bed height despite maxA of 1, requires modA of 2 from elevated height of bed, requires Micheal of 1 for standing within FWW, Ax1 for clothing management and pericares, pt incontinent of urine during session. Pt requires modA of 1 plus CGA of 1 for bed>chair transfer with FWW.   Barriers to return to prior living situation: Current level of assist; R sided rib pain with mobility; high risk of continued falls; need for 24/7 assist for all mobility  Recommendations for discharge: Memory care TCU   Rationale for recommendations: Patient would benefit from ongoing PT to address deficits with weakness; decreased activity tolerance, and impaired functional mobility to maximize return to baseline level of function and decrease current burden of care. Pt may benefit from possible transition to a more supportive living environment; Patient currently needs 24/7 assist of 1-2 for all mobility currently; If family declines TCU, recommend 24/7 assist/supervision for all mobility and Home PT.       Entered by: Jing Garcia 11/28/2018 10:21 AM          independent and compliant with comprehensive HEP to maintain progress achieved in PT     Plan: Continued to PT for left knee ROM, strengthening and stabilization exercises per pt tolerance. Discussed patient POC with the PT.  Nash Zhao)  6/21/18: HEP; bridging,

## 2018-11-28 NOTE — PROGRESS NOTES
Diagnosis: Dysphagia/Dysarthria   Authorized # of Visits: 8     Next MD visit: none scheduled  Fall Risk: standard         Precautions: n/a           Medication Changes since last visit?: No  Subjective: Pt reports completion of home program stating, \"I d x20 g words     Meseret     Effortful Swallow  x20  x20  x20  x20  x20  X20     Shaker Exercises  Not targeted today  Not targeted  x30 seated repetitions x2 sets      Modified Ugo Seated    30 repetitions x3 sets  x30 seated Ugo Repetitions   x3 lingual, laryngeal adduction, laryngeal elevation, BOT, Ugo, and effortful swallow exercises to strengthen and improve oropharyngeal function.  A modified version of Ugo implemented as pt continues to report difficulty with head lifts while lying down Shaker exercises  In progress            Speech Goals      Goal #1 The patient will complete oral agility drills with 90% accuracy provided minimal support across 12 sessions.    In progress    Goal #2 The patient will complete verbal agility drills with

## 2018-11-29 NOTE — TELEPHONE ENCOUNTER
Discussed with Beth--->no malignancy noted on Ct a/p. Some lung changes, but she denies any mucus/sputum/fevers/chills. Chronic changes as well. Will see her for EGD on 12/19. However she is not sure she wants to do PEG portion.  I advised her if she c

## 2018-12-04 PROBLEM — E11.29 TYPE 2 DIABETES MELLITUS WITH MICROALBUMINURIA, WITH LONG-TERM CURRENT USE OF INSULIN (HCC): Status: ACTIVE | Noted: 2018-01-01

## 2018-12-04 PROBLEM — R80.9 TYPE 2 DIABETES MELLITUS WITH MICROALBUMINURIA, WITH LONG-TERM CURRENT USE OF INSULIN (HCC): Status: ACTIVE | Noted: 2018-01-01

## 2018-12-04 PROBLEM — E53.8 B12 DEFICIENCY: Status: ACTIVE | Noted: 2018-01-01

## 2018-12-04 PROBLEM — Z79.4 TYPE 2 DIABETES MELLITUS WITH MICROALBUMINURIA, WITH LONG-TERM CURRENT USE OF INSULIN (HCC): Status: ACTIVE | Noted: 2018-01-01

## 2018-12-04 NOTE — PROGRESS NOTES
Neurology Follow up Visit     Referred By: Dr. Jen Pierce    Chief Complaint: Patient presents with:  Stroke (neurologic): LOV 9-18-18 pt is here to f/u on speech problem.  per pt speech problem is getting worsen since last office visit, currently on speech t September 2018, at that point she fell of her problem with speech and hoarseness of the voice that she was getting worse instead of better, she was having difficulty with swallowing and frequent choking.   She has been trying to lose weight and she lost 30 antineoplastic chemotherapy     40 YRS AGO   • PONV (postoperative nausea and vomiting)    • Sleep apnea     mild-using bipap at night   • Visual impairment     glasses       Past Surgical History:   Procedure Laterality Date   • ANTERIOR THR PRECAUTIONS R 1  •  Lisinopril-Hydrochlorothiazide 20-12.5 MG Oral Tab, Take 1 tablet by mouth daily. , Disp: 90 tablet, Rfl: 1  •  XARELTO 20 MG Oral Tab, Take 1 tablet (20 mg total) by mouth daily with food.  WITH DINNER, Disp: , Rfl:   •  Calcium Carb-Cholecalciferol ( deltoid and bicep muscles in the left arm and right arm in the forearm  Strength- upper extremities 5/5 proximally and distally, though there is some ulnar deviation bilaterally due to arthritic changes                  - lower  extremities 4/5 proximally questions were answered. All side effects of drugs were discussed. Time spent for examination, counseling and coordination of care such as potential treatment options- 25 minutes with more than 50% of the time spent counseling the patient.     Return to

## 2018-12-04 NOTE — PATIENT INSTRUCTIONS
ASSESSMENT/PLAN:   Vitamin d deficiency  (primary encounter diagnosis) Stable     Chronic obstructive pulmonary disease, unspecified copd type (hcc) Stable. No help with inhaler.  Follow up with lung MD.     Type 2 diabetes mellitus with microalbuminuria, w
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
## 2018-12-05 NOTE — PATIENT INSTRUCTIONS
1. Please continue to practice your speech production exercises   S,L,O,B  2. Please continue to practice your swallowing exercises 3-4x daily 10-20 repetitions   3.  Please follow up with me in 2-3 months or post your appointment with Wilfrid Schaumann

## 2018-12-05 NOTE — PROGRESS NOTES
Speech/Swallow Discharge Summary   Diagnosis: Dysphagia/Dysarthria   Authorized # of Visits: 8     Next MD visit: none scheduled  Fall Risk: standard         Precautions: n/a           Medication Changes since last visit?: No  Subjective: Pt reports, \"I h MET    Swallow strategies  Reviewed  Reviewed  Reviewed  SLP reviewed a slow rate with small bites/sips. Pt states, \"I need to remember to do that all the time. Cause when I do that, I do fine. \" Reviewed with the patient.  Garret Swallow Guideline Provide occur  x30 increased preciseness    SLOB Multi-syllabic Words  40% accuracy  50% accuracy  N/A  55% accuracy   55% accuracy; pt able to redd each syllable; however, slow rate and monotone quality   Pt denies doing home practice  70% accuracy after review o increases, pt with increased articulatory breakdowns and increased incoordination. Diaphragmatic  breathing exercises implemented and reviewed across all speech production tasks. Handouts provided on achieving adequate respiration for speech.   Pt able to verbal agility drills with 90% accuracy provided minimal support across 8-12 sessions.    Not met   Home practice given    Goal #3 The patient will utilize \"SLOB\" strategies across multi-syllabic words, phrases, and sentences provided minimal support denise

## 2018-12-10 NOTE — TELEPHONE ENCOUNTER
Patient calling was seen by Dr Cheung Plan on December 4th patient was given referral patient is calling on status.     Speech Therapist     Please call patient when authorized

## 2018-12-11 NOTE — TELEPHONE ENCOUNTER
Received fax from [email or web address] advising of approval for Nueuomuscular clinic @. [facility] for one unit/DOS. . Will call Pt. To inform. Pt. Informed of approval. States she will have new insurance starting 01/01/19 [facility] Medicare Advantage PPO. Has appt.  A

## 2018-12-19 NOTE — ANESTHESIA POSTPROCEDURE EVALUATION
Patient: Ahsan Band    Procedure Summary     Date:  12/19/18 Room / Location:  [address] [place] [address] ENDOSCOPY 01 / [address] [place] [address] ENDOSCOPY    Anesthesia Start:  3491 Anesthesia Stop:      Procedure:  ESOPHAGOGASTRODUODENOSCOPY (EGD) (N/A ) Diagnosis:       Weight loss      D

## 2018-12-19 NOTE — TELEPHONE ENCOUNTER
James Zavala from [id number] would like to speak to you directly regarding patient to home speech therapy.

## 2018-12-19 NOTE — ANESTHESIA PREPROCEDURE EVALUATION
Anesthesia PreOp Note    HPI:     Elisa Perales is a 81year old female who presents for preoperative consultation requested by: Davion Fall MD    Date of Surgery: 12/19/2018    Procedure(s):  ESOPHAGOGASTRODUODENOSCOPY (EGD)  Indication: Weight Chronic         Date Noted: 06/26/2017      Anemia associated with acute blood loss         Date Noted: 06/14/2017      Primary osteoarthritis of right hip         Date Noted: 06/13/2017      [facility])         Date Noted: 06/13/2017      BUTLER (dyspnea on before breakfast. Disp: 90 tablet Rfl: 1 12/18/2018   docusate sodium 100 MG Oral Cap Take 100 mg by mouth as needed. Disp:  Rfl:  12/18/2018   Pediatric Multivit-Minerals-C (GUMMI BEAR MULTIVITAMIN/MIN) Oral Chew Tab Chew 1 tablet by mouth daily.  Disp:  R Transportation needs - non-medical: Not on file    Occupational History      Not on file    Tobacco Use      Smoking status: Never Smoker      Smokeless tobacco: Never Used    Substance and Sexual Activity      Alcohol use: No      Drug use: No      Sexual after going to restroom related to exertion    Neuro/Psych    (+) TIA,     GI/Hepatic/Renal      Endo/Other    Abdominal   (+) scaphoid             Anesthesia Plan:   ASA:  4  Plan:   MAC  Informed Consent Plan and Risks Discussed With:  Patient and child/

## 2018-12-19 NOTE — OPERATIVE REPORT
ESOPHAGOGASTRODUODENOSCOPY (EGD) REPORT    Chucky WILDER 1935 Age 81year old   PCP Katrin Patel MD Endoscopist Sarbjit Shah MD     Date of procedure: 18    Procedure: EGD     Pre-operative diagnosis: Oropharyngeal dysph dysphagia is not apparent on upper endoscopy. No esophageal stricture present. The upper esophagus was intubated without difficulty. Suspect oropharyngeal dysphagia and drooling from neuromuscular etiology.    2. Incidental finding of small hiatal hernia an

## 2018-12-19 NOTE — H&P
History & Physical Examination    Patient Name: Ivy Donis  MRN: [id number]  [facility]: [id number]  YOB: 1935    Diagnosis: dysphagia, weight loss    Patient held her blood thinner for 2 days. Does NOT want PEG tube.       Medications Prior t with swallowing    • Shortness of breath    • Sleep apnea     mild-using bipap at night   • Visual impairment     glasses     Past Surgical History:   Procedure Laterality Date   • ANTERIOR THR PRECAUTIONS Right 6/13/17    DR. HAMMER   • APPENDECTOMY AM

## 2018-12-20 NOTE — PATIENT INSTRUCTIONS
SWALLOWING INSTRUCTIONS    DIET:  Soft Solids with extra sauces/gravies and  Thin liquids with a chin tuck     ____ SIT UPRIGHT    ____ SMALL BITES AND SIPS    ____ EAT SLOWLY    ____ ALTERNATED LIQUIDS AND SOLIDS    ____ Sowmya Pott WITH EACH BITE

## 2018-12-20 NOTE — PROGRESS NOTES
ADULT SPEECH/SWALLOWING EVALUATION:   Referring Physician: Dr. Vania Saeed  Dysphagia  Date of Service: 12/20/2018     PATIENT SUMMARY:   Bill Chaudhari is a 81year old y/o female who presents to therapy today with complaints of decline in speech that sta changes. 5.  Right renal cyst.  6.  Colonic diverticulosis. 7.  Post hysterectomy. Post appendectomy. Post splenectomy. Electronic record notes prior colon resection however no obvious anastomosis site is identified.     MRI November 1, 2018   Vilma Lantigua pharyngeal retention.            ASSESSMENT:     A swallow re-evaluation warranted as pt with persistent drooling with difficulty managing secretions. Pt with increased gagging, throat clearing, and audible swallows prior to the start of trials.  A PEG tube continue to demonstrate difficulty understanding her. Oral agility and verbal agility tasks appear impaired per informal measures. As rate of movements and/or speech increases, coordination and preciseness decline.   Pt reports, \"I really have a hard time and Residue impaired     Pharyngeal Phase of Swallow impaired Swallowing timing impaired , Laryngeal elevation and excursion impaired  and Signs/symptoms of aspiration w/ hard solids   (Please note: Silent aspiration cannot be evaluated clinically.  Carlos Vallecillo Established    Goal #3 The patient will utilize \"SLOB\" strategies across multi-syllabic words, phrases, and sentences provided minimal support across 4 sessions.    Goal Established    Goal #4 The patient will demonstrate diaphragmatic breathing exercises

## 2018-12-26 NOTE — PROGRESS NOTES
Diagnosis: Dysphagia/Dysarthria   Authorized # of Visits:  4-6        Next MD visit: none scheduled  Fall Risk: standard         Precautions: n/a           Medication Changes since last visit?: No  Subjective: Pt reports, \"I'm not getting any better. \" Pt of Ensure d/t significant weight loss. Pt tolerates soft solids and thin liquids with chin tuck maneuver with no overt CSA. X1 throat clear with neutral chin position. Pt states, \"I forgot to put my chin down! \" SLP thoroughly reviewed safe swallow compen Treatment Time: 45 min

## 2018-12-27 NOTE — PATIENT INSTRUCTIONS
HARD/ EFFORTFUL SWALLOW      PURPOSE  To increase tongue base retraction and pressure during the      pharyngeal phase of the swallow and reduce the amount of      food residue in the valleculae of the throat.         APPLICABILITY Patients who exhibit

## 2019-01-01 ENCOUNTER — APPOINTMENT (OUTPATIENT)
Dept: GENERAL RADIOLOGY | Facility: HOSPITAL | Age: 84
DRG: 189 | End: 2019-01-01
Attending: EMERGENCY MEDICINE
Payer: MEDICARE

## 2019-01-01 ENCOUNTER — TELEPHONE (OUTPATIENT)
Dept: INTERNAL MEDICINE CLINIC | Facility: CLINIC | Age: 84
End: 2019-01-01

## 2019-01-01 ENCOUNTER — PATIENT OUTREACH (OUTPATIENT)
Dept: CASE MANAGEMENT | Age: 84
End: 2019-01-01

## 2019-01-01 ENCOUNTER — NURSE ONLY (OUTPATIENT)
Dept: INTERNAL MEDICINE CLINIC | Facility: CLINIC | Age: 84
End: 2019-01-01
Payer: MEDICARE

## 2019-01-01 ENCOUNTER — APPOINTMENT (OUTPATIENT)
Dept: CV DIAGNOSTICS | Facility: HOSPITAL | Age: 84
DRG: 189 | End: 2019-01-01
Attending: INTERNAL MEDICINE
Payer: MEDICARE

## 2019-01-01 ENCOUNTER — HOSPITAL ENCOUNTER (INPATIENT)
Facility: HOSPITAL | Age: 84
LOS: 5 days | Discharge: HOME HEALTH | DRG: 189 | End: 2019-01-01
Attending: EMERGENCY MEDICINE | Admitting: INTERNAL MEDICINE
Payer: MEDICARE

## 2019-01-01 ENCOUNTER — APPOINTMENT (OUTPATIENT)
Dept: HEMATOLOGY/ONCOLOGY | Facility: HOSPITAL | Age: 84
End: 2019-01-01
Attending: INTERNAL MEDICINE
Payer: MEDICARE

## 2019-01-01 ENCOUNTER — PATIENT MESSAGE (OUTPATIENT)
Dept: INTERNAL MEDICINE CLINIC | Facility: CLINIC | Age: 84
End: 2019-01-01

## 2019-01-01 ENCOUNTER — HOSPITAL ENCOUNTER (OUTPATIENT)
Dept: CT IMAGING | Facility: HOSPITAL | Age: 84
Discharge: HOME | End: 2019-01-01
Attending: INTERNAL MEDICINE
Payer: MEDICARE

## 2019-01-01 VITALS
HEART RATE: 97 BPM | WEIGHT: 117.13 LBS | HEIGHT: 58 IN | TEMPERATURE: 97 F | BODY MASS INDEX: 24.59 KG/M2 | SYSTOLIC BLOOD PRESSURE: 132 MMHG | OXYGEN SATURATION: 98 % | DIASTOLIC BLOOD PRESSURE: 61 MMHG | RESPIRATION RATE: 20 BRPM

## 2019-01-01 DIAGNOSIS — G12.21 ALS (AMYOTROPHIC LATERAL SCLEROSIS) (HCC): ICD-10-CM

## 2019-01-01 DIAGNOSIS — R06.00 DYSPNEA, UNSPECIFIED TYPE: Primary | ICD-10-CM

## 2019-01-01 DIAGNOSIS — E53.8 B12 DEFICIENCY: Primary | ICD-10-CM

## 2019-01-01 DIAGNOSIS — R13.19 OTHER DYSPHAGIA: ICD-10-CM

## 2019-01-01 DIAGNOSIS — G47.33 OBSTRUCTIVE SLEEP APNEA SYNDROME: ICD-10-CM

## 2019-01-01 DIAGNOSIS — Z02.9 ENCOUNTERS FOR ADMINISTRATIVE PURPOSE: ICD-10-CM

## 2019-01-01 DIAGNOSIS — R06.00 DOE (DYSPNEA ON EXERTION): ICD-10-CM

## 2019-01-01 DIAGNOSIS — R63.4 WEIGHT LOSS: ICD-10-CM

## 2019-01-01 LAB
ALBUMIN SERPL BCP-MCNC: 3.5 G/DL (ref 3.5–4.8)
ALBUMIN/GLOB SERPL: 1.1 {RATIO} (ref 1–2)
ALP SERPL-CCNC: 42 U/L (ref 32–100)
ALT SERPL-CCNC: 14 U/L (ref 14–54)
ANION GAP SERPL CALC-SCNC: 13 MMOL/L (ref 0–18)
ANION GAP SERPL CALC-SCNC: 15 MMOL/L (ref 0–18)
AST SERPL-CCNC: 26 U/L (ref 15–41)
BASE EXCESS BLD CALC-SCNC: 11.9 MMOL/L (ref ?–2)
BASOPHILS # BLD: 0 K/UL (ref 0–0.2)
BASOPHILS # BLD: 0 K/UL (ref 0–0.2)
BASOPHILS NFR BLD: 0 %
BASOPHILS NFR BLD: 1 %
BILIRUB SERPL-MCNC: 0.8 MG/DL (ref 0.3–1.2)
BILIRUB UR QL: NEGATIVE
BNP SERPL-MCNC: 39 PG/ML (ref 0–100)
BUN SERPL-MCNC: 18 MG/DL (ref 8–20)
BUN SERPL-MCNC: 18 MG/DL (ref 8–20)
BUN/CREAT SERPL: 19.6 (ref 10–20)
BUN/CREAT SERPL: 21.2 (ref 10–20)
CALCIUM SERPL-MCNC: 9.2 MG/DL (ref 8.5–10.5)
CALCIUM SERPL-MCNC: 9.5 MG/DL (ref 8.5–10.5)
CHLORIDE SERPL-SCNC: 95 MMOL/L (ref 95–110)
CHLORIDE SERPL-SCNC: 96 MMOL/L (ref 95–110)
CHOLEST SERPL-MCNC: 173 MG/DL (ref 110–200)
CLARITY UR: CLEAR
CO2 SERPL-SCNC: 31 MMOL/L (ref 22–32)
CO2 SERPL-SCNC: 32 MMOL/L (ref 22–32)
COLOR UR: YELLOW
CREAT BLD-MCNC: 0.8 MG/DL (ref 0.5–1.5)
CREAT SERPL-MCNC: 0.85 MG/DL (ref 0.5–1.5)
CREAT SERPL-MCNC: 0.92 MG/DL (ref 0.5–1.5)
EOSINOPHIL # BLD: 0 K/UL (ref 0–0.7)
EOSINOPHIL # BLD: 0.1 K/UL (ref 0–0.7)
EOSINOPHIL NFR BLD: 0 %
EOSINOPHIL NFR BLD: 1 %
ERYTHROCYTE [DISTWIDTH] IN BLOOD BY AUTOMATED COUNT: 14.3 % (ref 11–15)
ERYTHROCYTE [DISTWIDTH] IN BLOOD BY AUTOMATED COUNT: 14.6 % (ref 11–15)
EST. AVERAGE GLUCOSE BLD GHB EST-MCNC: 114 MG/DL (ref 68–126)
GLOBULIN PLAS-MCNC: 3.2 G/DL (ref 2.5–3.7)
GLUCOSE BLDC GLUCOMTR-MCNC: 73 MG/DL (ref 70–99)
GLUCOSE SERPL-MCNC: 118 MG/DL (ref 70–99)
GLUCOSE SERPL-MCNC: 170 MG/DL (ref 70–99)
GLUCOSE UR-MCNC: NEGATIVE MG/DL
HBA1C MFR BLD HPLC: 5.6 % (ref ?–5.7)
HCO3 BLDA-SCNC: 34.2 MEQ/L (ref 21–27)
HCT VFR BLD AUTO: 39.8 % (ref 35–48)
HCT VFR BLD AUTO: 42.4 % (ref 35–48)
HDLC SERPL-MCNC: 90 MG/DL
HGB BLD-MCNC: 13.3 G/DL (ref 12–16)
HGB BLD-MCNC: 14 G/DL (ref 12–16)
KETONES UR-MCNC: NEGATIVE MG/DL
LDLC SERPL CALC-MCNC: 68 MG/DL (ref 0–99)
LYMPHOCYTES # BLD: 1.2 K/UL (ref 1–4)
LYMPHOCYTES # BLD: 1.3 K/UL (ref 1–4)
LYMPHOCYTES NFR BLD: 19 %
LYMPHOCYTES NFR BLD: 9 %
MCH RBC QN AUTO: 29.1 PG (ref 27–32)
MCH RBC QN AUTO: 29.7 PG (ref 27–32)
MCHC RBC AUTO-ENTMCNC: 32.9 G/DL (ref 32–37)
MCHC RBC AUTO-ENTMCNC: 33.4 G/DL (ref 32–37)
MCV RBC AUTO: 88.2 FL (ref 80–100)
MCV RBC AUTO: 88.9 FL (ref 80–100)
MODIFIED ALLEN TEST: POSITIVE
MONOCYTES # BLD: 0.7 K/UL (ref 0–1)
MONOCYTES # BLD: 0.9 K/UL (ref 0–1)
MONOCYTES NFR BLD: 14 %
MONOCYTES NFR BLD: 6 %
MRSA DNA SPEC QL NAA+PROBE: NEGATIVE
NEUTROPHILS # BLD AUTO: 11 K/UL (ref 1.8–7.7)
NEUTROPHILS # BLD AUTO: 4.6 K/UL (ref 1.8–7.7)
NEUTROPHILS NFR BLD: 67 %
NEUTROPHILS NFR BLD: 84 %
NITRITE UR QL STRIP.AUTO: NEGATIVE
NONHDLC SERPL-MCNC: 83 MG/DL
O2 CT BLD-SCNC: 18.6 VOL% (ref 15–23)
OSMOLALITY UR CALC.SUM OF ELEC: 295 MOSM/KG (ref 275–295)
OSMOLALITY UR CALC.SUM OF ELEC: 298 MOSM/KG (ref 275–295)
OXYGEN LITERS/MINUTE: 5 L/MIN
PCO2 BLDA: 60 MM HG (ref 35–45)
PH BLDA: 7.42 [PH] (ref 7.35–7.45)
PH UR: 5 [PH] (ref 5–8)
PLATELET # BLD AUTO: 329 K/UL (ref 140–400)
PLATELET # BLD AUTO: 359 K/UL (ref 140–400)
PMV BLD AUTO: 7.7 FL (ref 7.4–10.3)
PMV BLD AUTO: 7.8 FL (ref 7.4–10.3)
PO2 BLDA: 119 MM HG (ref 80–100)
POTASSIUM SERPL-SCNC: 3.3 MMOL/L (ref 3.3–5.1)
POTASSIUM SERPL-SCNC: 3.4 MMOL/L (ref 3.3–5.1)
POTASSIUM SERPL-SCNC: 3.7 MMOL/L (ref 3.3–5.1)
POTASSIUM SERPL-SCNC: 4 MMOL/L (ref 3.3–5.1)
PROCALCITONIN SERPL-MCNC: 0.05 NG/ML (ref ?–0.11)
PROT SERPL-MCNC: 6.7 G/DL (ref 5.9–8.4)
PROT UR-MCNC: 30 MG/DL
PUNCTURE CHARGE: YES
RBC # BLD AUTO: 4.48 M/UL (ref 3.7–5.4)
RBC # BLD AUTO: 4.8 M/UL (ref 3.7–5.4)
RBC #/AREA URNS AUTO: 4 /HPF
SAO2 % BLDA: 97 % (ref 94–100)
SODIUM SERPL-SCNC: 141 MMOL/L (ref 136–144)
SODIUM SERPL-SCNC: 141 MMOL/L (ref 136–144)
SP GR UR STRIP: 1.02 (ref 1–1.03)
TRIGL SERPL-MCNC: 75 MG/DL (ref 1–149)
TROPONIN I SERPL-MCNC: 0.05 NG/ML (ref ?–0.03)
TROPONIN I SERPL-MCNC: 0.23 NG/ML (ref ?–0.03)
TROPONIN I SERPL-MCNC: 0.45 NG/ML (ref ?–0.03)
TROPONIN I SERPL-MCNC: 0.97 NG/ML (ref ?–0.03)
TSH SERPL-ACNC: 1 UIU/ML (ref 0.45–5.33)
UROBILINOGEN UR STRIP-ACNC: <2
VIT C UR-MCNC: 40 MG/DL
WBC # BLD AUTO: 13.1 K/UL (ref 4–11)
WBC # BLD AUTO: 6.9 K/UL (ref 4–11)
WBC #/AREA URNS AUTO: 13 /HPF

## 2019-01-01 PROCEDURE — 99223 1ST HOSP IP/OBS HIGH 75: CPT | Performed by: INTERNAL MEDICINE

## 2019-01-01 PROCEDURE — 99233 SBSQ HOSP IP/OBS HIGH 50: CPT | Performed by: INTERNAL MEDICINE

## 2019-01-01 PROCEDURE — 82565 ASSAY OF CREATININE: CPT

## 2019-01-01 PROCEDURE — 99232 SBSQ HOSP IP/OBS MODERATE 35: CPT | Performed by: INTERNAL MEDICINE

## 2019-01-01 PROCEDURE — 71045 X-RAY EXAM CHEST 1 VIEW: CPT | Performed by: EMERGENCY MEDICINE

## 2019-01-01 PROCEDURE — 96372 THER/PROPH/DIAG INJ SC/IM: CPT | Performed by: INTERNAL MEDICINE

## 2019-01-01 PROCEDURE — 93306 TTE W/DOPPLER COMPLETE: CPT | Performed by: INTERNAL MEDICINE

## 2019-01-01 PROCEDURE — 71260 CT THORAX DX C+: CPT | Performed by: INTERNAL MEDICINE

## 2019-01-01 PROCEDURE — 99238 HOSP IP/OBS DSCHRG MGMT 30/<: CPT | Performed by: INTERNAL MEDICINE

## 2019-01-01 PROCEDURE — 99291 CRITICAL CARE FIRST HOUR: CPT | Performed by: INTERNAL MEDICINE

## 2019-01-01 RX ORDER — RILUZOLE 50 MG/1
50 TABLET, FILM COATED ORAL EVERY 12 HOURS
COMMUNITY

## 2019-01-01 RX ORDER — ACETAMINOPHEN 325 MG/1
325 TABLET ORAL EVERY 6 HOURS PRN
Status: DISCONTINUED | OUTPATIENT
Start: 2019-01-01 | End: 2019-01-01

## 2019-01-01 RX ORDER — POTASSIUM CHLORIDE 20 MEQ/1
40 TABLET, EXTENDED RELEASE ORAL EVERY 4 HOURS
Status: COMPLETED | OUTPATIENT
Start: 2019-01-01 | End: 2019-01-01

## 2019-01-01 RX ORDER — MORPHINE SULFATE 2 MG/ML
1 INJECTION, SOLUTION INTRAMUSCULAR; INTRAVENOUS EVERY 2 HOUR PRN
Status: DISCONTINUED | OUTPATIENT
Start: 2019-01-01 | End: 2019-01-01

## 2019-01-01 RX ORDER — RILUZOLE 50 MG/1
50 TABLET, FILM COATED ORAL EVERY 12 HOURS
Status: DISCONTINUED | OUTPATIENT
Start: 2019-01-01 | End: 2019-01-01

## 2019-01-01 RX ORDER — SODIUM CHLORIDE 9 MG/ML
INJECTION, SOLUTION INTRAVENOUS CONTINUOUS
Status: DISCONTINUED | OUTPATIENT
Start: 2019-01-01 | End: 2019-01-01

## 2019-01-01 RX ORDER — SODIUM CHLORIDE 0.9 % (FLUSH) 0.9 %
3 SYRINGE (ML) INJECTION AS NEEDED
Status: DISCONTINUED | OUTPATIENT
Start: 2019-01-01 | End: 2019-01-01

## 2019-01-01 RX ORDER — POLYETHYLENE GLYCOL 3350 17 G/17G
17 POWDER, FOR SOLUTION ORAL DAILY PRN
Status: DISCONTINUED | OUTPATIENT
Start: 2019-01-01 | End: 2019-01-01

## 2019-01-01 RX ORDER — SODIUM PHOSPHATE, DIBASIC AND SODIUM PHOSPHATE, MONOBASIC 7; 19 G/133ML; G/133ML
1 ENEMA RECTAL ONCE AS NEEDED
Status: DISCONTINUED | OUTPATIENT
Start: 2019-01-01 | End: 2019-01-01

## 2019-01-01 RX ORDER — CIPROFLOXACIN 500 MG/1
500 TABLET, FILM COATED ORAL 2 TIMES DAILY
Qty: 6 TABLET | Refills: 0 | Status: SHIPPED | OUTPATIENT
Start: 2019-01-01 | End: 2019-01-01

## 2019-01-01 RX ORDER — IPRATROPIUM BROMIDE AND ALBUTEROL SULFATE 2.5; .5 MG/3ML; MG/3ML
3 SOLUTION RESPIRATORY (INHALATION)
Status: DISCONTINUED | OUTPATIENT
Start: 2019-01-01 | End: 2019-01-01

## 2019-01-01 RX ORDER — MORPHINE SULFATE 4 MG/ML
4 INJECTION, SOLUTION INTRAMUSCULAR; INTRAVENOUS EVERY 2 HOUR PRN
Status: DISCONTINUED | OUTPATIENT
Start: 2019-01-01 | End: 2019-01-01

## 2019-01-01 RX ORDER — LISINOPRIL AND HYDROCHLOROTHIAZIDE 20; 12.5 MG/1; MG/1
1 TABLET ORAL DAILY
Status: DISCONTINUED | OUTPATIENT
Start: 2019-01-01 | End: 2019-01-01

## 2019-01-01 RX ORDER — DEXTROSE MONOHYDRATE 25 G/50ML
50 INJECTION, SOLUTION INTRAVENOUS AS NEEDED
Status: DISCONTINUED | OUTPATIENT
Start: 2019-01-01 | End: 2019-01-01

## 2019-01-01 RX ORDER — ACETAMINOPHEN 325 MG/1
650 TABLET ORAL EVERY 6 HOURS PRN
Status: DISCONTINUED | OUTPATIENT
Start: 2019-01-01 | End: 2019-01-01

## 2019-01-01 RX ORDER — ATORVASTATIN CALCIUM 40 MG/1
40 TABLET, FILM COATED ORAL NIGHTLY
Status: DISCONTINUED | OUTPATIENT
Start: 2019-01-01 | End: 2019-01-01

## 2019-01-01 RX ORDER — LEVOTHYROXINE SODIUM 0.1 MG/1
100 TABLET ORAL
Status: DISCONTINUED | OUTPATIENT
Start: 2019-01-01 | End: 2019-01-01

## 2019-01-01 RX ORDER — NORTRIPTYLINE HYDROCHLORIDE 50 MG/1
50 CAPSULE ORAL NIGHTLY
Status: DISCONTINUED | OUTPATIENT
Start: 2019-01-01 | End: 2019-01-01

## 2019-01-01 RX ORDER — METOCLOPRAMIDE HYDROCHLORIDE 5 MG/ML
10 INJECTION INTRAMUSCULAR; INTRAVENOUS EVERY 8 HOURS PRN
Status: DISCONTINUED | OUTPATIENT
Start: 2019-01-01 | End: 2019-01-01

## 2019-01-01 RX ORDER — ONDANSETRON 2 MG/ML
4 INJECTION INTRAMUSCULAR; INTRAVENOUS EVERY 6 HOURS PRN
Status: DISCONTINUED | OUTPATIENT
Start: 2019-01-01 | End: 2019-01-01

## 2019-01-01 RX ORDER — MORPHINE SULFATE 2 MG/ML
2 INJECTION, SOLUTION INTRAMUSCULAR; INTRAVENOUS EVERY 2 HOUR PRN
Status: DISCONTINUED | OUTPATIENT
Start: 2019-01-01 | End: 2019-01-01

## 2019-01-01 RX ORDER — NORTRIPTYLINE HYDROCHLORIDE 25 MG/1
50 CAPSULE ORAL NIGHTLY
COMMUNITY

## 2019-01-01 RX ORDER — CYANOCOBALAMIN 1000 UG/ML
1000 INJECTION INTRAMUSCULAR; SUBCUTANEOUS ONCE
Status: COMPLETED | OUTPATIENT
Start: 2019-01-01 | End: 2019-01-01

## 2019-01-01 RX ORDER — BISACODYL 10 MG
10 SUPPOSITORY, RECTAL RECTAL
Status: DISCONTINUED | OUTPATIENT
Start: 2019-01-01 | End: 2019-01-01

## 2019-01-01 RX ORDER — POTASSIUM CHLORIDE 20 MEQ/1
40 TABLET, EXTENDED RELEASE ORAL ONCE
Status: COMPLETED | OUTPATIENT
Start: 2019-01-01 | End: 2019-01-01

## 2019-01-01 RX ADMIN — CYANOCOBALAMIN 1000 MCG: 1000 INJECTION INTRAMUSCULAR; SUBCUTANEOUS at 14:13:00

## 2019-01-02 PROBLEM — G12.21 ALS (AMYOTROPHIC LATERAL SCLEROSIS) (HCC): Status: ACTIVE | Noted: 2019-01-01

## 2019-01-03 NOTE — TELEPHONE ENCOUNTER
From: Sherice Fontana  To: Roxana Art MD  Sent: 1/2/2019 1:19 PM CST  Subject: Other    Shashi Siamarek Huff was seen today at [facility] by Neuro-muscular clinic, Dr. Nicole Ruggiero.  She was diagnosed with Bulbar ALS and will follow up with thei

## 2019-01-07 NOTE — TELEPHONE ENCOUNTER
Ms. Kristivijay Cain calling to find out if you received office visit notes from Donita Grey by Neuro-muscular clinic, Dr. Art Gold?

## 2019-01-10 PROBLEM — R06.00 DYSPNEA: Status: ACTIVE | Noted: 2019-01-01

## 2019-01-10 PROBLEM — R06.00 DYSPNEA, UNSPECIFIED TYPE: Status: ACTIVE | Noted: 2019-01-01

## 2019-01-10 NOTE — SLP NOTE
ADULT SWALLOWING EVALUATION    ASSESSMENT    ASSESSMENT/OVERALL IMPRESSION:  Pt well known to me as she participates in swallowing and speech [facility] [address] [place] [address].  Earliest records of swallowing difficulties date back to 5/26/1 reduced hyolaryngeal elevation/excursion. No reflexive cough, reflexive throat clear, increased respiratory effort, and/or wet gurgly voice observed across all trials given. At this time, pt presents with moderate oropharyngeal dysphagia.  Recommend a pur disease    • Visual impairment     glasses     Patient/Family Goals: Resume oral diet     SWALLOWING HISTORY      Dysphagia History:     BSSE 12/26/19   A swallow re-evaluation warranted as pt with persistent drooling with difficulty managing secretions.  P      VFSS September 9, 2018   Pt presents with mild-moderate pharyngeal dysphagia evidenced by deep laryngeal penetration with thin liquids and significant pharyngeal residue observed post solid trials.  The level of penetration with thin liquids was redu Status: BiPAP;Nasal cannula(4L )  Consistencies Trialed: Nectar thick liquids;Puree  Method of Presentation: Self presentation;Staff/Clinician assistance;Spoon;Single sips;Cup  Patient Positioning: Upright;Midline    Oral Phase of Swallow: Impaired  Bolus

## 2019-01-10 NOTE — ED NOTES
Pt safe to transport to floor on BiPAP with RN on monitor and RT per MD. Report given to Cata Charles RN.
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
## 2019-01-10 NOTE — H&P
[facility]OSP - [facility]    History and Physical    Abdullahi Bias Patient Status:  Inpatient    1935 MRN [id number]   Location [facility] 2W/SW Attending Jamil Vera., MD   Hosp Day # 0 PCP Roula Diaz.  Jen Pierce MD     Date:  1/10/201 impairment     glasses     Past Surgical History:   Procedure Laterality Date   • ANTERIOR THR PRECAUTIONS Right 6/13/17    DR. HAMMER   • APPENDECTOMY     • APPENDECTOMY     • CATARACT Bilateral 1-10   • CHOLECYSTECTOMY     • COLONOSCOPY  2014    Benign Lisinopril-Hydrochlorothiazide 20-12.5 MG Oral Tab Take 1 tablet by mouth daily. XARELTO 20 MG Oral Tab Take 1 tablet (20 mg total) by mouth daily with food.  WITH DINNER   Calcium Carb-Cholecalciferol (CALCIUM 600+D) 600-800 MG-UNIT Oral Tab Take 2 tab Psychiatric/Behavioral: Negative for suicidal ideas, hallucinations, behavioral problems, confusion, sleep disturbance, self-injury, decreased concentration, agitation and depressed mood. The patient is not nervous/anxious and is not hyperactive.     All normal and intact distal pulses. Tachycardia present. Edema not present. Pulmonary/Chest: Accessory muscle usage present. No stridor. Tachypnea noted. No apnea and no bradypnea. She is not intubated. She is in respiratory distress.  She has decreased 05/12/2018    INR 1.0 05/24/2018    T4F 1.53 07/09/2018    TSH 1.91 12/04/2018    MG 1.8 10/18/2018    TROP 0.05 () 01/10/2019    B12 334 09/18/2018     Xr Chest Ap Portable  (cpt=71045)    Result Date: 1/10/2019  CONCLUSION:  Stable chest demonstrating

## 2019-01-10 NOTE — PROGRESS NOTES
[facility] Dosing Service  Antibiotic Dosing    Ayleen Dailey is a 81year old female for whom pharmacy is dosing Zosyn for treatment of  pneumonia. .  Other antibiotics (Not dosed by pharmacy): none    Allergies: is allergic to aspirin.     Vitals:

## 2019-01-10 NOTE — ED PROVIDER NOTES
Patient Seen in: [facility] AND [facility] Emergency Department    History   Patient presents with:  Chest Pain Angina (cardiovascular)    Stated Complaint: sob    HPI    Patient presents to the emergency department with complaint of difficulty breathing.   She h vaccines. • THYROIDECTOMY POST PREV THYR SURG  1962    3/4 romeved first. Not cancer. 2011: Bx. not enough tissue. Removed rest and benign. • TONSILLECTOMY     • TOTAL ABDOM HYSTERECTOMY  1978    MUNIR and BSO. Due to fibroids. No abNl paps.  Uterine ca and negative except as noted above. PSFH elements reviewed from today and agreed except as otherwise stated in HPI.     Physical Exam     ED Triage Vitals [01/10/19 0651]   BP (!) 177/87   Pulse 102   Resp (!) 32   Temp 97.6 °F (36.4 °C)   Temp src Tempo BiPAP.    I spent a total of 40 minutes of critical care time in obtaining history, performing a physical exam, bedside monitoring of interventions, collecting and interpreting tests and discussion with consultants but not including time spent performing s Pulse oximetry    Cardiac Monitor: Normal sinus rhythm    Disposition and Plan     We recommend that you schedule follow up care with a primary care provider within the next three months to obtain basic health screening including reassessment of your blood

## 2019-01-10 NOTE — CM/SW NOTE
Received MDO for Advanced Directives. Patient is listed as a Partial Code. POA is daughter Jone Kaye, form on file in [place]. Patient was recently diagnosed with ALS. [facility] [address] [facility] w/ her daughter, elevator building.  Patient has a walker, cane and CPA

## 2019-01-10 NOTE — HISTORICAL OFFICE NOTE
Danish Broussard  : 1935  ACCOUNT:  [id number]  [phone]  PCP: Dr. Bernda Wilkes     TODAY'S DATE: 2018  DICTATED BY:  [Dr. Ryanne Alvarado]      CHIEF COMPLAINT: [Followup of Atrial fibrillation, paroxysmal, Followup of Hypercholesterolemia, p with normal rate and rhythm, clear to auscultation. GI: no masses, tenderness or hepatosplenomegaly, rectal deferred. MS: adequate gait for exercise/testing. EXT: no clubbing or cyanosis. SKIN: no rashes, lesions, ulcers.   NEURO/PSYCH: alert and oriented daily                                    07/12/18 Simvastatin           10MG      daily                                    02/14/17 Chlorhexidine Gluconat0.12%     As directed                              02/14/17 Multivitamin Adult              1tab once

## 2019-01-11 NOTE — DIETARY NOTE
ADULT NUTRITION INITIAL ASSESSMENT    Pt is at high nutrition risk. Pt meets malnutrition criteria.       CRITERIA FOR MALNUTRITION DIAGNOSIS:  Criteria for severe malnutrition diagnosis: chronic illness related to wt loss greater than 10% in 6 months, rodney meals if needed. - Meals and snacks: Encouraged PO and supplement intake    - Enteral Nutrition: None at present time, pt unsure if she would want Enteral Nutrition. Pt newly diagnosed with ALS.     - Medical Food Supplements-RD added Samuel Warren oz)   01/10/19 0651 52.6 kg (116 lb)     Wt Readings from Last 10 Encounters:  01/11/19 : 53.6 kg (118 lb 1.6 oz)  12/19/18 : 54.4 kg (120 lb)  12/04/18 : 54.9 kg (121 lb)  12/04/18 : 54.4 kg (120 lb)  11/19/18 : 56.2 kg (124 lb)  10/25/18 : 57.6 kg (127 l 59-68 grams protein/day (1.3-1.5 grams protein per kg Ideal body wt (IBW))    MONITOR AND EVALUATE/NUTRITION GOALS:  - Food and Nutrient Intake:      Monitor: PO and supplement tolerance/intake and adequacy of intake.     - Food and Nutrient Administration:
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
## 2019-01-11 NOTE — PROGRESS NOTES
[facility] - [facility]    Progress Note    Rufino Price Patient Status:  Inpatient    1935 MRN [id number]   Location [facility] 3W/SW Attending Nki Rosen MD   [address] Day # 1 PCP Boris Trivedi.  Twin Rogel MD        Subjective:     C appears cachectic. She is active. Non-toxic appearance. She does not have a sickly appearance. She does not appear ill. No distress. She is not intubated. Nasal cannula in place. Neck: No tracheal deviation present.    Cardiovascular: Normal rate and reg 07/09/2018    TSH 1.00 01/10/2019    MG 1.8 10/18/2018    TROP 0.23 (HH) 01/11/2019    B12 334 09/18/2018       Xr Chest Ap Portable  (cpt=71045)    Result Date: 1/10/2019  CONCLUSION:  Stable chest demonstrating areas of pleuroparenchymal scarring and bib

## 2019-01-11 NOTE — SLP NOTE
SPEECH DAILY NOTE - INPATIENT    ASSESSMENT & PLAN   ASSESSMENT  RN reports, \"I can hear the swallow, but she isn't having  any overt clinical signs of aspiration. \" Pt and daughter at bedside. Pt denies any difficulties swallowing.  Pt states, \"I just ne Undetermined    Treatment Plan  Treatment Plan/Recommendations: Dysphagia therapy    Interdisciplinary Communication: Discussed with RN and pt's daughter.      GOALS  Goal #1 The patient will tolerate pureed  consistency and nectar thickened  liquids withou

## 2019-01-11 NOTE — CM/SW NOTE
01/11/19 1200   CM/SW Screening   Patient's Mental Status Alert;Oriented   Patient's Home Environment Condo/Apt with elevator  (3rd floor )   Patient lives with Children  (Dtr)   Patient Status Prior to Admission   Independent with ADLs and Mobility Yes

## 2019-01-11 NOTE — PLAN OF CARE
Problem: Patient Centered Care  Goal: Patient preferences are identified and integrated in the patient's plan of care  Interventions:  - What would you like us to know as we care for you?   - Provide timely, complete, and accurate information to patient/fa d/c'd. IV zosyn infusing. Ambulating with SBA with walker. O2 saturation 99% on 2L NC. Weaning off O2. Call light within reach. Frequent rounding done.

## 2019-01-11 NOTE — OCCUPATIONAL THERAPY NOTE
OCCUPATIONAL THERAPY EVALUATION - INPATIENT     Room Number: 317/317-A  Evaluation Date: 1/11/2019  Type of Evaluation: Initial       Physician Order: IP Consult to Occupational Therapy  Reason for Therapy: ADL/IADL Dysfunction and Discharge Planning    OC and will benefit from continued therapy to maximize function in prep for d/c to home.         DISCHARGE RECOMMENDATIONS  OT Discharge Recommendations: Home with home health PT/OT(Increased SPV)  OT Device Recommendations: Shower chair    PLAN  OT Treatment at [facility] ENDOSCOPY   • HIP TOTAL REPLACEMENT Right 6/13/2017    Performed by Luis Karimi MD at [facility] OR   • KNEE TOTAL REPLACEMENT Left 5/24/2018    Performed by Luis Karimi MD at [facility] OR   • REMOVAL GALLBLADDER     • REMOVAL SPLEEN, TOTAL Motor: WFL     ACTIVITIES OF DAILY LIVING ASSESSMENT  AM-PAC ‘6-Clicks’ Inpatient Daily Activity Short Form  How much help from another person does the patient currently need…  -   Putting on and taking off regular lower body clothing?: A Little  -   Sagar Given

## 2019-01-11 NOTE — PROGRESS NOTES
Pulmonary/Critical Care Follow Up Note    HPI:   Oval Flower is a 81year old female with Patient presents with:  Chest Pain Angina (cardiovascular)      PCP Katrin Mariee MD  Admission Attending Myron Painting 15 Day #1      No s packet 17 g, 17 g, Oral, Daily PRN  •  magnesium hydroxide (MILK OF MAGNESIA) 400 MG/5ML suspension 30 mL, 30 mL, Oral, Daily PRN  •  bisacodyl (DULCOLAX) rectal suppository 10 mg, 10 mg, Rectal, Daily PRN  •  FLEET ENEMA (FLEET) 7-19 GM/118ML enema 133 mL now                PCT                NIV at night      Bulbar ALS  formally dx last week  Bulbar signs  Presented with progressive wt loss and BUTLER with dysphagia over 12 months  Pt and her DTR obviously have just started to understand this dx  NIV was men

## 2019-01-11 NOTE — OCCUPATIONAL THERAPY NOTE
Attempted to work with patient this morning; off the floor for testing. Will follow up later.     Toshia Osorio, OTR/L   [facility]

## 2019-01-11 NOTE — SLP NOTE
SPEECH/LANGUAGE/COGNITIVE EVALUATION - INPATIENT    Admission Date: 1/10/2019  Evaluation Date: 01/11/19    Reason for Referral: Other (Comment)    [address]  In regards to S/L cognitive skills, pt reports slurred speech h daughter)  Prior Level of Function: Assistance/Support for ADL's      Imaging Results:   CXR 1/10/19   =====  CONCLUSION:   Stable chest demonstrating areas of pleuroparenchymal scarring and bibasilar atelectasis without radiographically evident acute intr

## 2019-01-11 NOTE — PHYSICAL THERAPY NOTE
PHYSICAL THERAPY EVALUATION - INPATIENT     Room Number: 317/317-A  Evaluation Date: 1/11/2019  Type of Evaluation: Initial   Physician Order: PT Eval and Treat    Presenting Problem: dyspnea  Reason for Therapy: Mobility Dysfunction and Discharge Plannin throughout the home. DISCHARGE RECOMMENDATIONS  PT Discharge Recommendations: 24 hour care/supervision;Home with home health PT    PLAN  PT Treatment Plan: Bed mobility; Body mechanics; Coordination; Endurance; Energy conservation;Patient education; Family • ESOPHAGOGASTRODUODENOSCOPY (EGD) N/A 12/19/2018    Performed by Quirino Snider MD at [place] [address] Right 6/13/2017    Performed by Víctor Donato MD at [facility] OR   • KNEE TOTAL REPLACEMENT Left 5/24/2018    Perform 3      AM-PAC '6-Clicks' INPATIENT SHORT FORM - BASIC MOBILITY  How much difficulty does the patient currently have. ..  -   Turning over in bed (including adjusting bedclothes, sheets and blankets)?: None   -   Sitting down on and standing up from a chair with home activity/exercise instructions provided to patient in preparation for discharge.    Goal #5   Current Status    Goal #6    Goal #6  Current Status

## 2019-01-12 NOTE — PROGRESS NOTES
[facility]    Cardiology - [facility]  Progress Note    Norma Enamorado Patient Status:  Inpatient    1935 MRN [id number]   Location [facility] 3W/SW Attending Naa Rodriguez MD   [address] Day # 1 PCP Fei Park.  Jenna Pagan MD 32 01/11/2019     (H) 01/11/2019    CA 9.2 01/11/2019    ALB 3.5 01/11/2019    ALKPHO 42 01/11/2019    BILT 0.8 01/11/2019    TP 6.7 01/11/2019    AST 26 01/11/2019    ALT 14 01/11/2019    PTT 32.4 05/12/2018    INR 1.0 05/24/2018    T4F 1.53 07/09/

## 2019-01-12 NOTE — PROGRESS NOTES
[facility]    Cardiology - [facility]  Progress Note    Jasmine Morales Patient Status:  Inpatient    1935 MRN [id number]   Location [facility] 3W/SW Attending Ibrahiam Medina MD   [facility] Day # 2 PCP Chico Buchanan.  Shlio Torrez MD BUN 18 01/11/2019     01/11/2019    K 3.4 01/12/2019    CL 96 01/11/2019    CO2 32 01/11/2019     (H) 01/11/2019    CA 9.2 01/11/2019    ALB 3.5 01/11/2019    ALKPHO 42 01/11/2019    BILT 0.8 01/11/2019    TP 6.7 01/11/2019    AST 26 01/11/201

## 2019-01-12 NOTE — PROGRESS NOTES
[facility]    Progress Note    Oval Flower Patient Status:  Inpatient    1935 MRN [id number]   Location [facility] 3W/SW Attending Meenakshi Cano MD   [facility] Day # 2 PCP Miley Mariee MD        Subjective:     C cannula in place. Neck: No tracheal deviation present. Cardiovascular: Normal rate and regular rhythm. Edema not present. Pulmonary/Chest: No accessory muscle usage. Tachypnea noted. No apnea. She is not intubated. No respiratory distress.  She has TP 6.7 01/11/2019    AST 26 01/11/2019    ALT 14 01/11/2019    PTT 32.4 05/12/2018    INR 1.0 05/24/2018    T4F 1.53 07/09/2018    TSH 1.00 01/10/2019    MG 1.8 10/18/2018    TROP 0.23 () 01/11/2019    B12 334 09/18/2018                        Katrin SALMON

## 2019-01-12 NOTE — PROGRESS NOTES
[facility] - [facility]     Progress Note        Michaela Javier Patient Status:  Inpatient    1935 MRN [id number]   Location [facility] 3W/SW Attending Kyle Ott MD   [facility] Day # 2 PCP Michael Pichardo.  Man Nieto MD       Subjective:   P suspension 30 mL 30 mL Oral Daily PRN   bisacodyl (DULCOLAX) rectal suppository 10 mg 10 mg Rectal Daily PRN   FLEET ENEMA (FLEET) 7-19 GM/118ML enema 133 mL 1 enema Rectal Once PRN   ondansetron HCl (ZOFRAN) injection 4 mg 4 mg Intravenous Q6H PRN   [employer]

## 2019-01-12 NOTE — HOME CARE LIAISON
Received referral from [facility] for residential home health. Met with patient at the bedside. Patient is agreeable to Residential Home Health services at discharge. Patient given brochure and liaison card. All questions and concerns were addressed.  Will

## 2019-01-12 NOTE — PLAN OF CARE
Double RN skin check done prior to transfer off Unit. Skin check performed by this RN and Dominick PENA Wounds are as follows: no wounds, Mepilex placed prophylactically    Will remain available for any further questions or concerns.

## 2019-01-13 NOTE — PROGRESS NOTES
[facility]    Progress Note    Michaela Javier Patient Status:  Inpatient    1935 MRN [id number]   Location [facility] 3W/SW Attending Kyle Ott MD   [facility] Day # 3 PCP Michael Pichardo.  Man Nieto MD        Subjective:     C has decreased breath sounds. She has no wheezes. She has no rhonchi. She has no rales. She exhibits no tenderness. Abdominal: Soft. Bowel sounds are normal. She exhibits no distension. There is no tenderness.  There is no rigidity, no rebound, no guarding 10/18/2018    TROP 0.23 (HH) 01/11/2019    B12 334 09/18/2018                        Katrin Crowe MD  1/13/2019

## 2019-01-13 NOTE — PROGRESS NOTES
[facility]    Cardiology - AM-S  Progress Note    Michaela Javier Patient Status:  Inpatient    1935 MRN [id number]   Location [facility] 3W/SW Attending Kyle Ott MD   [facility] Day # 3 PCP Michael Pichardo.  Man Nieto MD 01/11/2019    CREATSERUM 0.92 01/11/2019    BUN 18 01/11/2019     01/11/2019    K 4.0 01/13/2019    CL 96 01/11/2019    CO2 32 01/11/2019     (H) 01/11/2019    CA 9.2 01/11/2019    ALB 3.5 01/11/2019    ALKPHO 42 01/11/2019    BILT 0.8 01/11/2

## 2019-01-13 NOTE — PLAN OF CARE
Problem: Patient/Family Goals  Goal: Patient/Family Long Term Goal  Patient's Long Term Goal: to go home    Interventions:  - pulmonology on consult  - cardiology on consult  - IV ABT  - See additional Care Plan goals for specific interventions    Outcome:

## 2019-01-13 NOTE — PLAN OF CARE
On room air at rest O2 at 88% HR 94  2L at rest 97%   2L O2 95% during ambulation   2L at rest post ambulation 95% HR 89

## 2019-01-13 NOTE — OCCUPATIONAL THERAPY NOTE
Attempt made to work with patient this afternoon; pt sleeping soundly in bed; had been up in chair earlier ; will follow up later if schedule permits.      Hilda Osorio, OTR/L   [address] [facility]

## 2019-01-13 NOTE — PLAN OF CARE
Impaired Communication    • Patient will achieve maximal communication potential Not Progressing        Impaired Swallowing    • Minimize aspiration risk Not Progressing          Patient Centered Care    • Patient preferences are identified and integrated

## 2019-01-13 NOTE — PROGRESS NOTES
[facility]     Progress Note        Ahsan Band Patient Status:  Inpatient    1935 MRN [id number]   Location [facility] 3W/SW Attending Bonnie Acosta MD   [facility] Day # 3 PCP Bessie Ellsworth.  Gerson Serrato MD       Subjective:   P powder packet 17 g 17 g Oral Daily PRN   magnesium hydroxide (MILK OF MAGNESIA) 400 MG/5ML suspension 30 mL 30 mL Oral Daily PRN   bisacodyl (DULCOLAX) rectal suppository 10 mg 10 mg Rectal Daily PRN   FLEET ENEMA (FLEET) 7-19 GM/118ML enema 133 mL 1 enema

## 2019-01-13 NOTE — CM/SW NOTE
Referral sent to [facility], specific orders entered. POLST form presented to the family.       Saturnino Friedman LMSW., [id number]

## 2019-01-14 NOTE — PROGRESS NOTES
[facility]    Progress Note    Nae Marking Patient Status:  Inpatient    1935 MRN [id number]   Location [facility] 3W/SW Attending Reginald Benitez MD   [facility] Day # 4 PCP Rosey Arnold.  Rena Delgado MD        Subjective:     C 01/11/2019    K 4.0 01/13/2019    CL 96 01/11/2019    CO2 32 01/11/2019     (H) 01/11/2019    CA 9.2 01/11/2019    ALB 3.5 01/11/2019    ALKPHO 42 01/11/2019    BILT 0.8 01/11/2019    TP 6.7 01/11/2019    AST 26 01/11/2019    ALT 14 01/11/2019    PT

## 2019-01-14 NOTE — PHYSICAL THERAPY NOTE
PHYSICAL THERAPY TREATMENT NOTE - INPATIENT     Room Number: 937/474-N       Presenting Problem: dyspnea    Problem List  Principal Problem:    Dyspnea, unspecified type  Active Problems:    HTN (hypertension)    Hypothyroidism    COPD (chronic obstructive Plan: Bed mobility; Body mechanics; Endurance; Patient education;Gait training;Stair training;Transfer training;Balance training    SUBJECTIVE  \"I am from [place]\"    OBJECTIVE  Precautions: Cardiac(O2, swallow)    WEIGHT BEARING RESTRICTION  Weight Bearin met    Goal #2 Patient is able to demonstrate transfers EOB to/from Chair/Wheelchair at assistance level: modified independent with walker - rolling      Goal #2  Current Status Goal met    Goal #3 Patient is able to ambulate 150 feet with assist device: w

## 2019-01-14 NOTE — SLP NOTE
SPEECH DAILY NOTE - INPATIENT    ASSESSMENT & PLAN   ASSESSMENT  Pt seen for ongoing speech therapy for training of speech/communication strategies. Pt states, \"Thank you so much for the white board. It's been such a life saver! \" Pt continued, \"To conse accuracy provided minimal support across 2-3 sessions. Pt uses \"SLOB\" across conversational tasks with 70% accuracy. Continue to implement SLOB across verbal communication with supplemental written messages.      In Progress     FOLLOW UP  Follow Up Katy Caal

## 2019-01-14 NOTE — PROGRESS NOTES
[facility]    Progress Note    Bill Chaudhari Patient Status:  Inpatient    1935 MRN [id number]   Location [facility] 3W/SW Attending Brandon Guerra MD   [facility] Day # 4 PCP Rich Hopkins.  Vania Saeed MD        Subjective:     C rales. She exhibits no tenderness. Abdominal: Soft. Bowel sounds are normal. She exhibits no distension. There is no tenderness. There is no rigidity, no rebound, no guarding and no CVA tenderness.    Neurological: She is alert and oriented to person, dariel

## 2019-01-14 NOTE — PLAN OF CARE
Problem: Patient Centered Care  Goal: Patient preferences are identified and integrated in the patient's plan of care  Interventions:  - What would you like us to know as we care for you?  ALS is a new diagnosis for me  - Provide timely, complete, and accur upright for all feedings (90 degrees preferred)  - Offer food and liquids at a slow rate  - No straws  - Encourage small bites of food and small sips of liquid  - Offer pills one at a time, crush or deliver with applesauce as needed  - Discontinue feeding

## 2019-01-14 NOTE — PLAN OF CARE
Impaired Communication    • Patient will achieve maximal communication potential Progressing        Impaired Swallowing    • Minimize aspiration risk Progressing        Patient Centered Care    • Patient preferences are identified and integrated in the pat

## 2019-01-14 NOTE — SLP NOTE
SPEECH DAILY NOTE - INPATIENT    ASSESSMENT & PLAN   ASSESSMENT  RN reports, pt tolerates current diet and medication administration with no overt CSA. Pt reports, \"The suction has helped with the drooling. That was a good idea! \" SLP thoroughly reviewed therapy    Interdisciplinary Communication: Discussed with RN and pt's daughter.      GOALS  Goal #1 The patient will tolerate pureed  consistency and nectar thickened  liquids without overt signs or symptoms of aspiration with 100 % accuracy over 2-3 sessi

## 2019-01-14 NOTE — CM/SW NOTE
Patient with need for new home oxygen and suction machine. Referral to [facility] at [facility] ([phone]). Sats are documented and order is signed. [facility], RN, Willis [id number]     200 - MD order received for bipap.  Patient is current w/ cpap thou

## 2019-01-14 NOTE — OCCUPATIONAL THERAPY NOTE
OCCUPATIONAL THERAPY TREATMENT NOTE - INPATIENT        Room Number: 317/317-A                Problem List  Principal Problem:    Dyspnea, unspecified type  Active Problems:    HTN (hypertension)    Hypothyroidism    COPD (chronic obstructive pulmonary dise Short Form  How much help from another person does the patient currently need…  -   Putting on and taking off regular lower body clothing?: A Little  -   Bathing (including washing, rinsing, drying)?: A Little  -   Toileting, which includes using toilet, b

## 2019-01-14 NOTE — PROGRESS NOTES
[facility]    Cardiology - AMG-S  Progress Note    Rodríguez Mendes Patient Status:  Inpatient    1935 MRN [id number]   Location [facility] 3W/SW Attending Edilia Kay MD   [address] Day # 4 PCP Bryan Barr.  Kyle Falcon MD cooperative, calm    Results:     Lab Results   Component Value Date    WBC 6.9 01/11/2019    HGB 13.3 01/11/2019    HCT 39.8 01/11/2019     01/11/2019    CREATSERUM 0.92 01/11/2019    BUN 18 01/11/2019     01/11/2019    K 4.0 01/13/2019    CL

## 2019-01-15 NOTE — CM/SW NOTE
Per KELTON Berrios, patient will not need bipap. Notified [facility] from [facility] of the above. [facility] will deliver O2 tank shortly and discuss delivery of suction equipment.    Osvaldo Carl RN, CTL

## 2019-01-15 NOTE — PAYOR COMM NOTE
REF: [id number]  CLINICALS 1/10/19- 1/11/19 FAXED ON 1/11/19-  FAXING UPDATE 1/12/19-1/14/19 REQUESTING ADDITIONAL DAYS      1/12/19  Subjective:      Constitutional: Negative for activity change, appetite change, chills, diaphoresis, fatigue, fever and un rhythm. Edema not present. Pulmonary/Chest: No accessory muscle usage. Tachypnea noted. No apnea. She is not intubated. No respiratory distress. She has decreased breath sounds. She has no wheezes. She has no rhonchi. She has no rales.  She exhibits no 1/13/19  Subjective:   Patient seen and examined. Admits to some cough. Denies significant dyspnea at rest     Objective:   Blood pressure 139/75, pulse 90, temperature 97.6 °F (36.4 °C), temperature source Axillary, resp.  rate 16, height 4' 10 Rectal Once PRN   ondansetron HCl (ZOFRAN) injection 4 mg 4 mg Intravenous Q6H PRN   Metoclopramide HCl (REGLAN) injection 10 mg 10 mg Intravenous Q8H PRN   lisinopril (PRINIVIL,ZESTRIL) 20 mg, hydrochlorothiazide (MICROZIDE) 12.5 mg for Zestoretic 20-12. 5 polyuria. Genitourinary: Negative for bladder incontinence, dysuria, urgency, frequency, hematuria, flank pain and difficulty urinating.    Neurological: Negative for dizziness, tremors, seizures, syncope, facial asymmetry, speech difficulty, weakness, li Rx. CPM.         Elevated trop.      No CP.      ECG with nospecific changes. Cards consulted. Nl stress test in 11-23-18. Echo done.         Moderate protein calorie malnut. Dietary following pt. No thin liquids.         UTI. On ABx.  Ucx. + for KP.

## 2019-01-15 NOTE — DISCHARGE SUMMARY
DISCHARGE SUMMARY     [facility] Patient Status:  Inpatient    1935 MRN [id number]   Location [facility] 3W/SW Attending Tim Jose., MD   [address] Day # 5 PCP Laverne Wilcox.  Malissa Francis MD     Date of Admission: 1/10/2019  Date of Discharge bilaterally  Cardiovascular: S1, S2 normal  Abdominal: soft, non-tender; bowel sounds normal  Extremities: no cyanosis or edema    Procedures during hospitalization:   •     Incidental or significant findings and recommendations (brief descriptions):  • Nortriptyline HCl 25 MG Caps  Commonly known as:  PAMELOR      Take 50 mg by mouth nightly. Refills:  0     Riluzole 50 MG Tabs      Take 50 mg by mouth every 12 (twelve) hours.    Refills:  0     simvastatin 10 MG Tabs  Commonly known as:  Sakshi SUBRAMANIAN (six) hours as needed for Pain. Chlorhexidine Gluconate 0.12 % Mouth/Throat Solution  daily as needed. docusate sodium 100 MG Oral Cap  Take 100 mg by mouth as needed.               Discharge Diet: General diet    Discharge Activity: As tolerated

## 2019-01-16 NOTE — PROGRESS NOTES
Initial Post Discharge Follow Up   Discharge Date: 1/15/19  Contact Date: 1/16/2019    Consent Verification:  Assessment Completed With: Other: daughter Mily Flanagan received per patient?  written  HIPAA Verified?   Yes    Discharge Dx:  ALS and UTI 20-12.5 MG Oral Tab Take 1 tablet by mouth daily. Disp: 90 tablet Rfl: 1   XARELTO 20 MG Oral Tab Take 1 tablet (20 mg total) by mouth daily with food.  WITH DINNER Disp:  Rfl:    Calcium Carb-Cholecalciferol (CALCIUM 600+D) 600-800 MG-UNIT Oral Tab Take 2 [address] [facility] [address]  [place] [address] Hematology Oncology  [address] [facility] Lurdes Mckeon Elly Jhonathan [address] [phone]          P Repair Hemostasis (Optional): Electrodesiccation

## 2019-01-16 NOTE — PROGRESS NOTES
[facility]    Progress Note      Assessment and Plan:   1. Chronic respiratory failure associated with ALS–hypercapnia.     Recommendations: BiPAP nightly, follow-up with [facility] ALS clinic, follow-up with Dr. Clari Lassiter at the 2-4-week inte

## 2019-01-18 NOTE — TELEPHONE ENCOUNTER
Home health nurse called from [address] [phone], patient was admitted to their services last night, she would like to speak to you in regards to reviewing everything with you.

## 2019-01-22 NOTE — TELEPHONE ENCOUNTER
Time sensitive orders for [facility] were faxed and received by them successfully.  Forms were sent to scanning

## 2019-01-22 NOTE — TELEPHONE ENCOUNTER
Faxed Certificate of Medical Necessity  oxygen to [facility] from [facility].  Was received successfully; forms sent to scan

## 2019-01-29 NOTE — PAYOR COMM NOTE
--------------REQUESTING ADDITIONAL DAYS 1/13, AND 1/14 WITH DISCHARGE ON 1/15    DISCHARGE REVIEW    Payor: BCBS MEDICARE ADV PPO  Subscriber #:  [id number]  Authorization Number: [id number]    Admit date: 1/10/19  Admit time:  1009  Discharge Date: 1/1   Elevated WBC count. Now, resolved.     ? Aspiration?      H/O COPD.      H/O sleep apnea. IV ABx. Blood Cx. X 2 NTD from 1-10-19. Pulmonary following pt. Bronchial hygiene. Aspiration precautions. Speech Ok for nectar thick. Ucx. + KP. CPT and neb.  Rx. Calcium Carb-Cholecalciferol 600-800 MG-UNIT Tabs  Commonly known as:  CALCIUM 600+D      Take 2 tablets by mouth daily. Quantity:  60 tablet  Refills:  0     Chlorhexidine Gluconate 0.12 % Soln  Commonly known as:  PERIDEX      daily as needed.    Refill Take 1 tablet (500 mg total) by mouth 2 (two) times daily for 3 days. Home Meds - Unchanged    Nortriptyline HCl 25 MG Oral Cap  Take 50 mg by mouth nightly. Riluzole 50 MG Oral Tab  Take 50 mg by mouth every 12 (twelve) hours.     Levothyroxine Sod  1935 MRN [id number]   Location [facility] 3W/SW Attending Malka Stearns., MD   [address] Day # 3 PCP Missael Hernandez.  Clive Granados MD         Subjective:      Constitutional: Negative for activity change, appetite change, chills, diaphoresis, fatigue, fev Pulmonary/Chest: No accessory muscle usage or stridor. No apnea, no tachypnea and no bradypnea. She is not intubated. No respiratory distress. She has decreased breath sounds. She has no wheezes. She has no rhonchi. She has no rales.  She exhibits no tender   BILT 0.8 01/11/2019     TP 6.7 01/11/2019     AST 26 01/11/2019     ALT 14 01/11/2019     PTT 32.4 05/12/2018     INR 1.0 05/24/2018     T4F 1.53 07/09/2018     TSH 1.00 01/10/2019     MG 1.8 10/18/2018     TROP 0.23 ([facility]) 01/11/2019     B12 334 09/18/201 Normal Saline Flush 0.9 % injection 3 mL 3 mL Intravenous PRN   dextrose 50 % injection 50 mL 50 mL Intravenous PRN   Glucose-Vitamin C (DEX-4) 4-6 GM-MG chewable tab 4 tablet 4 tablet Oral Q15 Min PRN   glucose (DEX4) oral liquid 15 g 15 g Oral Q15 Min LA -Chest x-ray with no significant abnormalities  -Bronchial hygiene.   Frequent suctioning.  -Continue biotic therapy at this time  -Nightly NIV  -DNR/DNI        Vero Khan DO  Pulmonary Critical Care Medicine  [facility]                         1 -Patient with evidence of acute respiratory failure although may be underlying acute on chronic in nature.  -New diagnosis of ALS  -Chest x-ray with no significant abnormalities  -Bronchial hygiene.  Frequent suctioning.  -Continue biotic therapy at this t

## 2019-02-01 NOTE — TELEPHONE ENCOUNTER
Patient admitted to Hospice at home with [facility]. Left message for  Carmen Meraz to call office. Phone number [phone].

## 2019-02-01 NOTE — TELEPHONE ENCOUNTER
Left message for Nicholas Alfred (intake RN) have left several messages. Message left to call back if she still needed to speak with Dr. Betina Hammonds. Unable to close message due to incomplete note by Dr. Betina Hammonds.

## 2019-09-28 NOTE — TELEPHONE ENCOUNTER
[id number] Vanesa Viramontes , thank you As evidenced by HbA1c 11.6%, unable to count carbohydrate at baseline

## 2021-08-19 NOTE — PATIENT INSTRUCTIONS
Common Middle Ear Problems  Your middle ear may have been injured or infected recently. Over time, certain growths or bone disease can also harm the middle ear. Left untreated, middle ear problems often lead to lifelong hearing loss.  There are two types none

## 2021-09-21 NOTE — TELEPHONE ENCOUNTER
Pt notified, will have x-ray completed prior to VQ scan. Verbalized understanding and denied further questions. Spoke with patient & informed him that we will have to order the egg free flu vaccine & that it will take 1 week to arrive.

## 2023-01-24 NOTE — ASSESSMENT & PLAN NOTE
Done 6-18. Needs recheck Goal LDL: < 70. Consent (Lip)/Introductory Paragraph: The rationale for Mohs was explained to the patient and consent was obtained. The risks, benefits and alternatives to therapy were discussed in detail. Specifically, the risks of lip deformity, changes in the oral aperture, infection, scarring, bleeding, prolonged wound healing, incomplete removal, allergy to anesthesia, nerve injury and recurrence were addressed. Prior to the procedure, the treatment site was clearly identified and confirmed by the patient. All components of Universal Protocol/PAUSE Rule completed.

## 2023-03-29 NOTE — TELEPHONE ENCOUNTER
Patient is requesting a referral for ENT has questions wants to know if her breathing problems are associated with her throat issues. Patient states she spoke to you briefly at last office visit. Rituxan Counseling:  I discussed with the patient the risks of Rituxan infusions. Side effects can include infusion reactions, severe drug rashes including mucocutaneous reactions, reactivation of latent hepatitis and other infections and rarely progressive multifocal leukoencephalopathy.  All of the patient's questions and concerns were addressed.

## (undated) DEVICE — Device: Brand: POWER-FLO®

## (undated) DEVICE — 3M™ MEDITPORE™ SOFT CLOTH TAPE 6 IN X 10 YD 12 ROLLS/CASE 2966: Brand: 3M™ MEDIPORE™

## (undated) DEVICE — 3M™ STERI-STRIP™ REINFORCED ADHESIVE SKIN CLOSURES, R1547, 1/2 IN X 4 IN (12 MM X 100 MM), 6 STRIPS/ENVELOPE: Brand: 3M™ STERI-STRIP™

## (undated) DEVICE — PACK CDS TOTAL HIP

## (undated) DEVICE — Device: Brand: STABLECUT®

## (undated) DEVICE — SOL  .9 1000ML BAG

## (undated) DEVICE — T5 HOOD WITH PEEL AWAY FACE SHIELD

## (undated) DEVICE — DRESSING 10X4IN ANMC SAFETAC

## (undated) DEVICE — LIMBHOLDER RSTRNT FOAM ADLT

## (undated) DEVICE — DERMABOND LIQUID ADHESIVE

## (undated) DEVICE — BATTERY

## (undated) DEVICE — POLAR CARE CUBE COOLING UNIT

## (undated) DEVICE — GOWN SURG AERO BLUE PERF XLG

## (undated) DEVICE — PENCIL ESURG 10FT 3/32IN SS

## (undated) DEVICE — BIPOLAR SEALER 23-112-1 AQM 6.0: Brand: AQUAMANTYS™

## (undated) DEVICE — DRAPE SRG 90X60IN BCK TBL CVR

## (undated) DEVICE — TOWEL OR BLU 16X26 STRL

## (undated) DEVICE — 12 ML SYRINGE LUER-LOCK TIP: Brand: MONOJECT

## (undated) DEVICE — COVER SGL STRL LGHT HNDL BLU

## (undated) DEVICE — VIOLET BRAIDED (POLYGLACTIN 910), SYNTHETIC ABSORBABLE SUTURE: Brand: COATED VICRYL

## (undated) DEVICE — SOL  .9 1000ML BTL

## (undated) DEVICE — 48MM PIN

## (undated) DEVICE — DUAL CUT SAGITTAL BLADE

## (undated) DEVICE — SUTURE VICRYL 2-0 CT-1

## (undated) DEVICE — SOL  .9 3000ML

## (undated) DEVICE — FAN SPRAY KIT: Brand: PULSAVAC®

## (undated) DEVICE — 25MM TIBIAL FEMALE HEX

## (undated) DEVICE — NEEDLE SPINAL 18X3-1/2 PINK.

## (undated) DEVICE — STERILE LATEX POWDER-FREE SURGICAL GLOVESWITH NITRILE COATING: Brand: PROTEXIS

## (undated) DEVICE — STERILE LATEX POWDER-FREE SURGICAL GLOVES WITH HYDROGEL COATING, SMOOTH FINISH, STRAIGHT FINGER: Brand: PROTEXIS

## (undated) DEVICE — GOWN SURG AERO BLUE PERF LG

## (undated) DEVICE — CONTAINER SPEC STR 4OZ GRY LID

## (undated) DEVICE — LINE MNTR ADLT SET O2 INTMD

## (undated) DEVICE — Device

## (undated) DEVICE — ENDOSCOPY PACK UPPER: Brand: MEDLINE INDUSTRIES, INC.

## (undated) DEVICE — BLADE SW .5IN MCHC 2.75IN

## (undated) DEVICE — ZIMMER® STERILE DISPOSABLE TOURNIQUET CUFF WITH PLC, DUAL PORT, SINGLE BLADDER, 34 IN. (86 CM)

## (undated) DEVICE — BLADE SAW SAGITTAL 19.5

## (undated) DEVICE — TRAY SRGPRP PVP IOD WT SCRB SM

## (undated) DEVICE — TRAY FOLEY BDX 16F STATLOCK

## (undated) DEVICE — PSI PERSONA PS PIN GDE FEM-TIB

## (undated) DEVICE — COVER SLV UNV DISP NTR STRL LF

## (undated) DEVICE — SUTURE VICRYL 2-0 FS-1

## (undated) DEVICE — NEEDLE HPO 18GA 1.5IN ECLPS

## (undated) DEVICE — DISPOSABLE DRILL/PIN SET

## (undated) DEVICE — 3M™ COBAN™ NL STERILE NON-LATEX SELF-ADHERENT WRAP, 2084S, 4 IN X 5 YD (10 CM X 4,5 M), 18 ROLLS/CASE: Brand: 3M™ COBAN™

## (undated) DEVICE — PAD THRP 16IN WRPON MU LNG STM

## (undated) DEVICE — BANDAGE FLXMSTR 11YDX6IN STRL

## (undated) DEVICE — TOTAL KNEE: Brand: MEDLINE INDUSTRIES, INC.

## (undated) DEVICE — SPLINT ORTH UNV HIP PD CUP LG

## (undated) DEVICE — SUCTION CANISTER, 3000CC,SAFELINER: Brand: DEROYAL

## (undated) DEVICE — 2DSM24 2-0 UND MONODERM 30X30: Brand: 2DSM24 2-0 UND MONODERM 30X30

## (undated) DEVICE — GAUZE SPONGES,12 PLY: Brand: CURITY

## (undated) DEVICE — DRESSING BORDER AQUACEL 4X10

## (undated) DEVICE — CHLORAPREP 26ML APPLICATOR

## (undated) DEVICE — PROXIMATE SKIN STAPLERS (35 WIDE) CONTAINS 35 STAINLESS STEEL STAPLES (FIXED HEAD): Brand: PROXIMATE

## (undated) DEVICE — 3M™ TEGADERM™ TRANSPARENT FILM DRESSING, 1626W, 4 IN X 4-3/4 IN (10 CM X 12 CM), 50 EACH/CARTON, 4 CARTON/CASE: Brand: 3M™ TEGADERM™

## (undated) DEVICE — Device: Brand: DEFENDO AIR/WATER/SUCTION AND BIOPSY VALVE

## (undated) DEVICE — STERILE POLYISOPRENE POWDER-FREE SURGICAL GLOVES: Brand: PROTEXIS

## (undated) DEVICE — COTTON ROLL: Brand: DEROYAL

## (undated) DEVICE — CONMED SCOPE SAVER BITE BLOCK, 20X27 MM: Brand: SCOPE SAVER

## (undated) DEVICE — 3M™ STERI-DRAPE™ INCISE DRAPE 1050 (60CM X 45CM): Brand: STERI-DRAPE™

## (undated) DEVICE — SYRINGE MNJCT 35ML LF STRL LL

## (undated) DEVICE — OCCLUSIVE GAUZE STRIP,3% BISMUTH TRIBROMOPHENATE IN PETROLATUM BLEND: Brand: XEROFORM

## (undated) NOTE — MR AVS SNAPSHOT
Saeed Terrazas [address] [place] [address] [phone]  [phone]               Thank you for choosing us for your health care visit with Bee Tong NP.   We are glad to serve you and happy to provide yo with questions or there are any differences    · Heart healthy, decreased refined sugars, and  2000 mg sodium restricted diet and maintain fluids at least 64-96  ounces per day.  Common high sodium foods include frozen dinners, soups (not homemade), some ce Rivaroxaban 20 MG Tabs   Take 1 tablet (20 mg total) by mouth daily with food. Commonly known as:  XARELTO           simvastatin 5 MG Tabs   Take 1 tablet (5 mg total) by mouth once daily.    What changed:  when to take this   Commonly known as:  ZOCOR

## (undated) NOTE — LETTER
Speech/Swallow Discharge Summary   Diagnosis: Dysphagia/Dysarthria   Authorized # of Visits: [id number]     Next MD visit: none scheduled  Fall Risk: standard         Precautions: n/a           Medication Changes since last visit?: No  Subjective: Pt reports, \"I h GOAL MET    Swallow strategies  Reviewed  Reviewed  Reviewed  SLP reviewed a slow rate with small bites/sips. Pt states, \"I need to remember to do that all the time. Cause when I do that, I do fine. \" Reviewed with the patient.  Garret Swallow Guideline Pr breakdowns occur  x30 increased preciseness    SLOB Multi-syllabic Words  40% accuracy  50% accuracy  N/A  55% accuracy   55% accuracy; pt able to redd each syllable; however, slow rate and monotone quality   Pt denies doing home practice  70% accuracy aft with slow, but rhythmic rate. As rate increases, pt with increased articulatory breakdowns and increased incoordination. Diaphragmatic  breathing exercises implemented and reviewed across all speech production tasks.  Handouts provided on achieving adequat Home practice given    Goal #2 The patient will complete verbal agility drills with 90% accuracy provided minimal support across 8-12 sessions.    Not met   Home practice given    Goal #3 The patient will utilize \"SLOB\" strategies across multi-syllabic wo

## (undated) NOTE — LETTER
Dear Dr. Leonarda Richards    This letter is to inform you that Ahsan Bill has been attending Physical Therapy with me. See below for my most recent plan of care.        Patient Name: Ahsan Bill, : 1935, MRN: [id number]   Date:   all ADLs without issues))  Current status G Code: Discharge, Mobility: Walking and Moving Around, CI: 1%-19% impaired, limited, or restricted  Goal status G Code:  Mobility: Walking and Moving Around, CI: 1%-19% impaired, limited, or restricted        Plan:

## (undated) NOTE — LETTER
[facility]  Administration of Anesthesia  1. Radha Hughes, or _________________________________ acting on her behalf, (Patient) (Dependent/Representative) request to receive anesthesia for my pending procedure/operation/treatment. infections, high spinal block, spinal bleeding, seizure, cardiac arrest and death. 7. AWARENESS: I understand that it is possible (but unlikely) to have explicit memory of events from the operating room while under general anesthesia.   8. ELECTROCONVULSIV unconscious pt /Relationship    My signature below affirms that prior to the time of the procedure, I have explained to the patient and/or his/her guardian, the risks and benefits of undergoing anesthesia, as well as any reasonable alternatives.     _______

## (undated) NOTE — Clinical Note
01/20/2017     Dr. Dejon Bianchi  [address] [place] [address] [facility] [address]. Elana Magaña,     I saw Milton Sparrow in the afib clinic today. She has a history of brief pAF during stress echo.  She is doing well, remaining in SR/RRR on rythmol, ant

## (undated) NOTE — MR AVS SNAPSHOT
[address] [facility] [address]  [phone]               Thank you for choosing us for your health care visit with Bola Quintanilla MD.  We are glad to serve you and happy to provide you with this summary of your visi Assoc Dx:  Pre-op evaluation [T75.495]                 Scheduling Instructions     Monday May 22, 2017     Imaging:  XR CHEST AP/PA (1 VIEW) (CPT=71010)    Instructions:   To schedule a test at any [facility], call Ramiro Barnes Use as directed 15 mL in the mouth or throat 2 (two) times daily as needed. Commonly known as:  PERIDEX           Fish Oil 1200 MG Caps   Take 1,200 mg by mouth daily.            ibuprofen 200 MG Tabs   Take 200 mg by mouth every 6 (six) hours as needed f

## (undated) NOTE — ED AVS SNAPSHOT
Roland Barton   MRN: [id number]    Department:  [facility] Emergency Department   Date of Visit:  1/20/2018           Disclosure     Insurance plans vary and the physician(s) referred by the ER may not be covered by your plan.  Please conta within the next three months to obtain basic health screening including reassessment of your blood pressure.     IF THERE IS ANY CHANGE OR WORSENING OF YOUR CONDITION, CALL YOUR PRIMARY CARE PHYSICIAN AT ONCE OR RETURN IMMEDIATELY TO THE EMERGENCY DEPARTMEN

## (undated) NOTE — MR AVS SNAPSHOT
[address] [facility] [address]  [phone]               Thank you for choosing us for your health care visit with Laverne Wilcox.  Malissa Francis MD.  We are glad to serve you and happy to provide you with this summary of your v BP Pulse Temp Height Weight BMI    134/66 mmHg 104 97.6 °F (36.4 °C) (Oral) 4' 10.5\" (1.486 m) 170 lb 6.4 oz (77.293 kg) 35.00 kg/m2    Breastfeeding?                    No              Current Medications          This list is accurate as of: 2/23/17  3: Visit [email or web address]://mychart. health. org to learn more. Educational Information     Healthy Diet and Regular Exercise  [employer] for making healthy food choices  -   Enjoy your food, but eat less.   Fully enjoy your food when ea

## (undated) NOTE — LETTER
Dear Dr. Cielo Cruz. Alexander    This letter is to inform you that Roland Barton has been attending dysphagia therapy  with me. She has completed all recommended sessions and is able to complete the exercises independently.   She still requires occasional independent   Base of tongue ex Trained and completed x10. Mod cues for protrusion. Unable to protrude tongue without touching lips. Completed x15 each. Min cues. 90% accuracy  x20  Min cues  Lingual protrusion improving.   Now able to protrude just b Laryngeal adduction  Laryngeal elevation  Base of tongue exercises  Effortful swallow  Mendelsohn exercises  Meseret    All exercises completed.   Patient reported not doing adduction exercises as loudly because she becomes hoarse and it gives her a sore thr

## (undated) NOTE — ED AVS SNAPSHOT
Kiara Lindo   MRN: [id number]    Department:  [facility] Emergency Department   Date of Visit:  7/29/2017           Disclosure     Insurance plans vary and the physician(s) referred by the ER may not be covered by your plan.  Please conta CARE PHYSICIAN AT ONCE OR RETURN IMMEDIATELY TO THE EMERGENCY DEPARTMENT. If you have been prescribed any medication(s), please fill your prescription right away and begin taking the medication(s) as directed.   If you believe that any of the medications

## (undated) NOTE — LETTER
ADULT VIDEOFLUOROSCOPIC SWALLOWING STUDY    Referring Physician: Dr. Man Nieto  Diagnosis: Pharyngeal dysphagia   Date of Service: 9/13/2018   Radiologist: Dr. Torres Lozano     Dear Dr. Man Nieto,  This letter is to inform you of Willie Fowler Study Position and View:  Patient was seated upright and viewed laterally. Pain Assessment: The patient denies pain. Oral phase:  WFL. Pt with good oral acceptance and bilabial seal across all consistencies given.  Pt with intact mastication, bolus f FCM category and level: Swallowing, 5  Current status G Code: Initial, Swallowing, CJ: 20-39% impaired, limited, or restricted  Goal status G Code: Swallowing, CI: 1%-19% impaired, limited, or restricted  PLAN   Potential: Guarded    Diet Recommendations: I certify the need for these services furnished under this plan of treatment and while under my care.       X______________________________________________ Date________________  Certification From: 9/70/2188      To: 12/12/2018    Patient Name: Jeannine Logan

## (undated) NOTE — MR AVS SNAPSHOT
[address] [place] [address] [phone]  [phone]               Thank you for choosing us for your health care visit with Faby Barron NP.   We are glad to serve you and happy to provide yo walking at a slow to moderate pace for 5-10 minutes 2-3 times a day. Pace your activity to prevent shortness of breath or fatigue.  Stop exercise if you develop chest pain, lightheadedness, or significant shortness of breath                         [facility]

## (undated) NOTE — LETTER
Maged Cox Md  [address] [facility], [address] [place] [address]       10/12/17        Patient: Mary Gray   YOB: 1935   Date of Visit: 10/12/2017       Dear  Dr. Mike Myers MD,      Thank you for referring Mary Gray to my practi

## (undated) NOTE — MR AVS SNAPSHOT
[address] [facility] [address]  [phone]               Thank you for choosing us for your health care visit with Aristides Owens MD.  We are glad to serve you and happy to provide you with this summary of your v ASSESSMENT/PLAN:   Vitamin d deficiency  (primary encounter diagnosis) Check blood. Essential hypertension ? Control. Careful with diet and excercise at least 30 minutes 3-4 times a week. Check blood pressures at different times on different days.  Can Take 1 tablet by mouth daily. What changed:  when to take this           multivitamin Tabs   Take 1 tablet by mouth daily. Propafenone HCl  MG Cp12   Take 1 capsule (325 mg total) by mouth Q12H.    Commonly known as:  RYTHMOL SR

## (undated) NOTE — MR AVS SNAPSHOT
[address] [place] [address]  Juanito    [phone]               Thank you for choosing us for your health care visit with Umer Fischer NP.   We are glad to serve you and happy to provide yo CALCIUM 600+D 600-800 MG-UNIT Tabs   Generic drug:  Calcium Carb-Cholecalciferol   Take 2 tablets by mouth daily. Chlorhexidine Gluconate 0.12 % Soln   Use as directed 15 mL in the mouth or throat 2 (two) times daily as needed.    Commonly known

## (undated) NOTE — LETTER
Patient Name: Sherice Fontana  YOB: 1935          MRN number:  [id number]  Date:  12/21/2018  Referring Physician: Bessy Munoz     ADULT SPEECH/SWALLOWING EVALUATION:    Referring Physician: Dr. Eva Dailey  Dysphagia  Date of Service: 12/ 3.  Coronary atherosclerosis. 4.  Post cholecystectomy with intra and extrahepatic biliary ductal dilatation, likely representing post cholecystectomy and age related changes. 5.  Right renal cyst.  6.  Colonic diverticulosis. 7.  Post hysterectomy.   Po strategies below.  Four sessions of dysphagia therapy are recommended to train compensatory strategies and train exercises to improve airway closure and improve strength of swallow and reduce pharyngeal retention.              ASSESSMENT:     A swallow re- to improve oropharyngeal strength and function. Dysphagia exercises reviewed with the patient.      In regards to S/L cognitive skills, pt reports slurred speech has slightly improved; however, people continue to demonstrate difficulty understanding her. Goal #3 Patient will reduce risk of aspiration by completing the following dysphagia exercises to 100% accuracy 20 repetitions 2-3x/day across 3-4 sessions.    Laryngeal adduction  Laryngeal elevation  Base of tongue exercises  Effortful swallow  Lingual Ex X______________________________________________ Date________________  Certification From: 75/75/5939      To: 3/20/2019

## (undated) NOTE — IP AVS SNAPSHOT
[address] [facility] ~ [phone]                Discharge Summary   1/3/2017    [address]           Admission Information        Provider Department    1/3/2017 Katrin Coles MD [facility] 1 Commonly known as:  [facility]   Next dose due:  1/4/17 TONIGHT 9 PM        Take 1 tablet (5 mg total) by mouth once daily.     Al Frock your doctor about these medications        Instructions Authorizing Provider    Cayla Recent Hematology Lab Results  (Last 3 results in the past 90 days)    WBC RBC Hemoglobin Hematocrit MCV MCH MCHC RDW Platelet MPV    (92/03/22)  7.9 (01/04/17)  4.26 (01/04/17)  13.1 (01/04/17)  38.2 (01/04/17)  89.7 (01/04/17)  30.7 (01/04/17)  34.2 -- ( - If you have concerns related to behavioral health issues or thoughts of harming yourself, contact [address] [place] [address] at [phone].     - If you don’t have insurance, Ebonie Burgos has partnered with Patient [place] What to report to your healthcare team:  Dizziness, muscle aches, constipation           Blood Thinners (Anticoagulants)     Rivaroxaban 20 MG Oral Tab       Use: Prevent the development or progression of blood clots   Most common side effects: Abnormal bl

## (undated) NOTE — LETTER
ADULT VIDEOFLUOROSCOPIC SWALLOWING STUDY    Referring Physician: Dr. Jamie Corral  Diagnosis: dysphagia   Date of Service: 6/21/2018   Radiologist: Dr. Nadine Nicole results and/or plan of treatment.     HISTOR Pain Assessment: The patient reports pain at a level of 0/10. Oral phase: Within functional limits,except patient was not able to propel a barium tablet posteriorly with the aid of multiple consistencies.   Eventually she was able to swallow it when she restricted    PLAN   Potential: Good    Diet Recommendations:  Solids: Regular  Liquids:  Thin    Recommended compensatory strategies:   Sit upright  Small sips  Small bites  Eat slowly  Alternate liquids and solids  Swallow twice with each bite  No straw Electronically signed by therapist: TRACY Escalante    [de-identified] certification required: Yes  I certify the need for these services furnished under this plan of treatment and while under my care.       X____________________________________________

## (undated) NOTE — LETTER
Patient Name: Rommel Leon  YOB: 1935          MRN number:  [id number]  Date:  6/13/2018  Referring Physician: Davey Keller      POST-OP KNEE EVALUATION:    Referring Physician: Dr. Jose Armando Laughlin  Diagnosis:    S/P total knee arthroplasty mobility and strength limitations s/p L TKA. Patient's post- surgical impairments include; decreased knee ROM, decreased patellar mobility, decreased flexibility, decreased LE strength, and impaired proprioception.  These impairments are leading to functi 2x10, sidelying hip abduction 2x10, clamshell 2x10, heel slide for knee flexion to perform all 2x/day. Patient able to perform correctly.  Therapist assisted PROM performed    Charges: PT Alinaal Low Complexity, TE x 1      Total Timed Treatment: 10 min     To Thank you for your referral. Please co-sign or sign and return this letter via fax as soon as possible to [phone].  If you have any questions, please contact me at Dept: [phone]    Sincerely,  Electronically signed by therapist: Homa Alaniz,

## (undated) NOTE — LETTER
[facility], [place]  Authorization for Invasive Procedures  1.  I hereby authorize  Dr Kendall Laboy  , my physician and whomever may be designated as the doctor's assistant, to perform the following operation and/or procedure: Brenden performed for the purposes of advancing medicine, science, and/or education, provided my identity is not revealed. If the procedure has been videotaped, the physician/surgeon will obtain the original videotape.  The hospital will not be responsible for stor My signature below affirms that prior to the time of the procedure, I have explained to the patient and/or her legal representative, the risks and benefits involved in the proposed treatment and any reasonable alternative to the proposed treatment.  I have

## (undated) NOTE — ED AVS SNAPSHOT
Mary Gray   MRN: [id number]    Department:  [facility] Emergency Department   Date of Visit:  1/10/2018           Disclosure     Insurance plans vary and the physician(s) referred by the ER may not be covered by your plan.  Please conta within the next three months to obtain basic health screening including reassessment of your blood pressure.     IF THERE IS ANY CHANGE OR WORSENING OF YOUR CONDITION, CALL YOUR PRIMARY CARE PHYSICIAN AT ONCE OR RETURN IMMEDIATELY TO THE EMERGENCY DEPARTMEN

## (undated) NOTE — LETTER
[facility]  Administration of Anesthesia  1. Mayur Broderick, or _________________________________ acting on her behalf, (Patient) (Dependent/Representative) request to receive anesthesia for my pending procedure/operation/treatment. infections, high spinal block, spinal bleeding, seizure, cardiac arrest and death. 7. AWARENESS: I understand that it is possible (but unlikely) to have explicit memory of events from the operating room while under general anesthesia.   8. ELECTROCONVULSIV unconscious pt /Relationship    My signature below affirms that prior to the time of the procedure, I have explained to the patient and/or his/her guardian, the risks and benefits of undergoing anesthesia, as well as any reasonable alternatives.     _______

## (undated) NOTE — LETTER
Patient Name: Ta Olea  YOB: 1935          MRN number:  [id number]  Date:  11/8/2018  Referring Physician: Fifi Munoz     ADULT SPEECHSWALLOW EVALUATION:    Referring Physician: Dr. Gage Martinez  Diagnosis:   Dysarthria   Dysphagia seal across majority of consistencies given. Pt with anterior spillage and loss of bolus across x2 thin liquid trials via open cup. Oral transit impaired as evidenced by increased mastication time and reduced bolus formation.  No significant oral residue ob liquids without overt signs or symptoms of aspiration with 100 % accuracy over 3 session(s).    Goal #2 The patient/family/caregiver will demonstrate understanding and implementation of aspiration precautions and swallow strategies independently over 3 sess X______________________________________________ Date________________  Certification From: 33/9/2654      To: 2/5/2019

## (undated) NOTE — IP AVS SNAPSHOT
[address]  [address] [facility] [phone]                Discharge Summary   6/13/2017    Ronald Villatoro           Admission Information        Provider Department    6/13/2017 Leslie Mckenzie MD [facility] Notes to Patient:  After 8pm        Take 1 tablet by mouth every 4 (four) hours as needed.     Yohannes Hoffman                    For pain          CHANGE how you take these medications        Instructions Authorizing Provider    Morning Afternoon Evening Last time this was given:  1 tablet on 6/15/2017  8:41 AM   Next dose due:  TOMORROW MORNING        Take 1 tablet by mouth daily.          9am                     Propafenone HCl  MG Cp12   Last time this was given:  325 mg on 6/15/2017  8:53 AM   Com Follow up with Adalgisa Steel MD In 1 week. Specialty:  Internal Medicine    Contact information:    [facility] [address]  [phone]        Future Appointments     Jun 26, 2017  1:30 [facility] Follow Up with Cielo Cruz.  Madhavi Alford 1.6 (H) (06/15/17)  0.2 (06/15/17)  0.0    (05/26/17)  54 (05/26/17)  31 (05/26/17)  10 (05/26/17)  4 (05/26/17)  1  (05/26/17)  3.7 (05/26/17)  2.2 (05/26/17)  0.7 (05/26/17)  0.3 (05/26/17)  0.1      Metabolic Lab Results  (Last result in the past 90 day Support Staff. Remember, [facility] is NOT to be used for urgent needs. For medical emergencies, dial 911. Visit [email or web address] to learn more.             _____________________________________________________________________________    [employer] Most common side effects: Constipation, drowsiness, dizziness, urinary retention (inability to urinate)   What to report to your healthcare team: Difficulty urinating, dizziness, no bowel movement in 2+ days, unresolved pain           Non-Narcotic Pain Med

## (undated) NOTE — ED AVS SNAPSHOT
Ta Olea   MRN: [id number]    Department:  [facility] Emergency Department   Date of Visit:  6/27/2018           Disclosure     Insurance plans vary and the physician(s) referred by the ER may not be covered by your plan.  Please conta within the next three months to obtain basic health screening including reassessment of your blood pressure.     IF THERE IS ANY CHANGE OR WORSENING OF YOUR CONDITION, CALL YOUR PRIMARY CARE PHYSICIAN AT ONCE OR RETURN IMMEDIATELY TO THE EMERGENCY DEPARTMEN

## (undated) NOTE — LETTER
[address], [place]  Authorization for Invasive Procedures  1.  I hereby authorize Dr. Symone Farrell , my physician and whomever may be designated as the doctor's assistant, to perform the following operation and/or procedure:  Solomon Nash performed for the purposes of advancing medicine, science, and/or education, provided my identity is not revealed. If the procedure has been videotaped, the physician/surgeon will obtain the original videotape.  The hospital will not be responsible for stor My signature below affirms that prior to the time of the procedure, I have explained to the patient and/or her legal representative, the risks and benefits involved in the proposed treatment and any reasonable alternative to the proposed treatment.  I have

## (undated) NOTE — Clinical Note
Hi Dr. Eva Dailey - thank you for sending Tracy Weber, a very pleasant lady with a strange presentation of dysphagia/dysarthria. I do believe it is a neuromuscular issue, but will perform EGD to r/out any esophageal strictures.  Additionally, I think it is best we pr

## (undated) NOTE — Clinical Note
01/06/2017    Dr. Dara Quiroz  [address] [place] [address] [place] [address] Dr. Sharonda Vora,    I saw Shviam eVga today in the afib clinic for hospital follow up for paroxysmal atrial fib during stress echo with history of episodic dizziness.  She is

## (undated) NOTE — LETTER
ADULT VIDEOFLUOROSCOPIC SWALLOWING STUDY    Referring Physician: Dr. Onel Elmore  Diagnosis: Pharyngeal dysphagia   Date of Service: 9/13/2018   Radiologist: Dr. Rossy Samuel     Dear Dr. Onel Elmore,  This letter is to inform you of Kemar Lo Study Position and View:  Patient was seated upright and viewed laterally. Pain Assessment: The patient denies pain. Oral phase:  WFL. Pt with good oral acceptance and bilabial seal across all consistencies given.  Pt with intact mastication, bolus f recommended to f/u with neurologist d/t dysphagia and dysarthria. Patient in agreement with POC.      FCM category and level: Swallowing, 5  Current status G Code: Initial, Swallowing, CJ: 20-39% impaired, limited, or restricted  Goal status G Code: Swallow Electronically signed by therapist: TRACY Murillo    [de-identified] certification required: Yes  I certify the need for these services furnished under this plan of treatment and while under my care.       X______________________________________________